# Patient Record
Sex: FEMALE | Race: WHITE | NOT HISPANIC OR LATINO | Employment: OTHER | URBAN - METROPOLITAN AREA
[De-identification: names, ages, dates, MRNs, and addresses within clinical notes are randomized per-mention and may not be internally consistent; named-entity substitution may affect disease eponyms.]

---

## 2017-02-27 ENCOUNTER — OFFICE VISIT (OUTPATIENT)
Dept: URGENT CARE | Facility: CLINIC | Age: 67
End: 2017-02-27
Payer: MEDICARE

## 2017-02-27 VITALS
SYSTOLIC BLOOD PRESSURE: 122 MMHG | OXYGEN SATURATION: 93 % | TEMPERATURE: 97.5 F | HEART RATE: 88 BPM | RESPIRATION RATE: 20 BRPM | DIASTOLIC BLOOD PRESSURE: 80 MMHG

## 2017-02-27 DIAGNOSIS — R09.81 SINUS CONGESTION: ICD-10-CM

## 2017-02-27 DIAGNOSIS — H93.8X3 CONGESTION OF BOTH EARS: Primary | ICD-10-CM

## 2017-02-27 PROCEDURE — 99213 OFFICE O/P EST LOW 20 MIN: CPT | Performed by: PHYSICIAN ASSISTANT

## 2017-02-27 PROCEDURE — 3014F SCREEN MAMMO DOC REV: CPT | Mod: 8P | Performed by: PHYSICIAN ASSISTANT

## 2017-02-27 PROCEDURE — 4040F PNEUMOC VAC/ADMIN/RCVD: CPT | Mod: 8P | Performed by: PHYSICIAN ASSISTANT

## 2017-02-27 PROCEDURE — G8484 FLU IMMUNIZE NO ADMIN: HCPCS | Performed by: PHYSICIAN ASSISTANT

## 2017-02-27 PROCEDURE — 1036F TOBACCO NON-USER: CPT | Performed by: PHYSICIAN ASSISTANT

## 2017-02-27 PROCEDURE — 3017F COLORECTAL CA SCREEN DOC REV: CPT | Mod: 8P | Performed by: PHYSICIAN ASSISTANT

## 2017-02-27 PROCEDURE — G8432 DEP SCR NOT DOC, RNG: HCPCS | Performed by: PHYSICIAN ASSISTANT

## 2017-02-27 PROCEDURE — G8421 BMI NOT CALCULATED: HCPCS | Performed by: PHYSICIAN ASSISTANT

## 2017-02-27 PROCEDURE — 1101F PT FALLS ASSESS-DOCD LE1/YR: CPT | Mod: 8P | Performed by: PHYSICIAN ASSISTANT

## 2017-02-27 ASSESSMENT — ENCOUNTER SYMPTOMS: COUGH: 1

## 2017-02-27 NOTE — PATIENT INSTRUCTIONS
Sinus Rinse  WHAT IS A SINUS RINSE?  A sinus rinse is a simple home treatment that is used to rinse your sinuses with a sterile mixture of salt and water (saline solution). Sinuses are air-filled spaces in your skull behind the bones of your face and forehead that open into your nasal cavity.  You will use the following:  · Saline solution.  · Neti pot or spray bottle. This releases the saline solution into your nose and through your sinuses. Neti pots and spray bottles can be purchased at your local pharmacy, a health food store, or online.  WHEN WOULD I DO A SINUS RINSE?  A sinus rinse can help to clear mucus, dirt, dust, or pollen from the nasal cavity. You may do a sinus rinse when you have a cold, a virus, nasal allergy symptoms, a sinus infection, or stuffiness in the nose or sinuses.  If you are considering a sinus rinse:  · Ask your child's health care provider before performing a sinus rinse on your child.  · Do not do a sinus rinse if you have had ear or nasal surgery, ear infection, or blocked ears.  HOW DO I DO A SINUS RINSE?  · Wash your hands.  · Disinfect your device according to the directions provided and then dry it.  · Use the solution that comes with your device or one that is sold separately in stores. Follow the mixing directions on the package.  · Fill your device with the amount of saline solution as directed by the device instructions.  · Stand over a sink and tilt your head sideways over the sink.  · Place the spout of the device in your upper nostril (the one closer to the ceiling).  · Gently pour or squeeze the saline solution into the nasal cavity. The liquid should drain to the lower nostril if you are not overly congested.  · Gently blow your nose. Blowing too hard may cause ear pain.  · Repeat in the other nostril.  · Clean and rinse your device with clean water and then air-dry it.  ARE THERE RISKS OF A SINUS RINSE?   Sinus rinse is generally very safe and effective. However, there  are a few risks, which include:   · A burning sensation in the sinuses. This may happen if you do not make the saline solution as directed. Make sure to follow all directions when making the saline solution.  · Infection from contaminated water. This is rare, but possible.  · Nasal irritation.     This information is not intended to replace advice given to you by your health care provider. Make sure you discuss any questions you have with your health care provider.     Document Released: 07/15/2015 Document Reviewed: 07/15/2015  Elsevier Interactive Patient Education ©2016 Elsevier Inc.

## 2017-02-27 NOTE — MR AVS SNAPSHOT
Madelyn Montes   2017 3:45 PM   Office Visit   MRN: [id number]    Department:  [facility]   Dept Phone:  [phone]    Description:  Female : 1950   Provider:  Esequiel Rahman PA-C           Reason for Visit     Cough Week ago erache , stuffy nose , few days w=dry cough and chest congestion .      Allergies as of 2017     Allergen Noted Reactions    Pcn [Penicillins] 2011   Rash    HAS TAKEN AMOXICILLIN BEFORE    Soy 2011       Swelling along face left    Sulfa Drugs 2011   Nausea, Anxiety      You were diagnosed with     Congestion of both ears   [5417204]  -  Primary     Sinus congestion   [[id number]]         Vital Signs     Blood Pressure Pulse Temperature Respirations Oxygen Saturation Smoking Status    122/80 mmHg 88 36.4 °C (97.5 °F) 20 93% Never Smoker       Basic Information     Date Of Birth Sex Race Ethnicity Preferred Language    1950 Female White Non- English      Problem List              ICD-10-CM Priority Class Noted - Resolved    Seasonal allergic rhinitis J30.2   2011 - Present      Health Maintenance        Date Due Completion Dates    IMM DTaP/Tdap/Td Vaccine (1 - Tdap) 1971 ---    PAP SMEAR 1973 ---    MAMMOGRAM 1992 ---    COLONOSCOPY 2002 ---    IMM ZOSTER VACCINE 2012 ---    IMM INFLUENZA (1) 2016 ---    BONE DENSITY 2017 ---    IMM PNEUMOCOCCAL 65+ (ADULT) LOW/MEDIUM RISK SERIES (1 of 2 - PCV13) 2017 ---            Current Immunizations     No immunizations on file.      Below and/or attached are the medications your provider expects you to take. Review all of your home medications and newly ordered medications with your provider and/or pharmacist. Follow medication instructions as directed by your provider and/or pharmacist. Please keep your medication list with you and share with your provider. Update the information when medications are discontinued, doses are changed, or new  medications (including over-the-counter products) are added; and carry medication information at all times in the event of emergency situations     Allergies:  PCN - Rash     SOY - (reactions not documented)     SULFA DRUGS - Nausea,Anxiety               Medications  Valid as of: February 27, 2017 -  4:12 PM    Generic Name Brand Name Tablet Size Instructions for use    Azithromycin (Tab) ZITHROMAX 250 MG Use as directed        Cetirizine HCl (Cap) Cetirizine HCl 10 MG Take 0.5 mg by mouth every day.        Erythromycin (Ointment) erythromycin 5 MG/GM Place  in both eyes every day.        .                 Medicines prescribed today were sent to:     [id number] - [place], [place] - [address] [facility]    [address] [address]    Phone: [phone] Fax: [phone]    Open 24 Hours?: No    [facility] [place] - [address]    [address] [address]    Phone: [phone] Fax: [phone]    Open 24 Hours?: No      Medication refill instructions:       If your prescription bottle indicates you have medication refills left, it is not necessary to call your provider’s office. Please contact your pharmacy and they will refill your medication.    If your prescription bottle indicates you do not have any refills left, you may request refills at any time through one of the following ways: The online [employer] system (except Urgent Care), by calling your provider’s office, or by asking your pharmacy to contact your provider’s office with a refill request. Medication refills are processed only during regular business hours and may not be available until the next business day. Your provider may request additional information or to have a follow-up visit with you prior to refilling your medication.   *Please Note: Medication refills are assigned a new Rx number when refilled electronically. Your pharmacy may indicate that no  refills were authorized even though a new prescription for the same medication is available at the pharmacy. Please request the medicine by name with the pharmacy before contacting your provider for a refill.        Instructions    Sinus Rinse  WHAT IS A SINUS RINSE?  A sinus rinse is a simple home treatment that is used to rinse your sinuses with a sterile mixture of salt and water (saline solution). Sinuses are air-filled spaces in your skull behind the bones of your face and forehead that open into your nasal cavity.  You will use the following:  · Saline solution.  · Neti pot or spray bottle. This releases the saline solution into your nose and through your sinuses. Neti pots and spray bottles can be purchased at your local pharmacy, a [employer] store, or online.  WHEN WOULD I DO A SINUS RINSE?  A sinus rinse can help to clear mucus, dirt, dust, or pollen from the nasal cavity. You may do a sinus rinse when you have a cold, a virus, nasal allergy symptoms, a sinus infection, or stuffiness in the nose or sinuses.  If you are considering a sinus rinse:  · Ask your child's health care provider before performing a sinus rinse on your child.  · Do not do a sinus rinse if you have had ear or nasal surgery, ear infection, or blocked ears.  HOW DO I DO A SINUS RINSE?  · Wash your hands.  · Disinfect your device according to the directions provided and then dry it.  · Use the solution that comes with your device or one that is sold separately in stores. Follow the mixing directions on the package.  · Fill your device with the amount of saline solution as directed by the device instructions.  · Stand over a sink and tilt your head sideways over the sink.  · Place the spout of the device in your upper nostril (the one closer to the ceiling).  · Gently pour or squeeze the saline solution into the nasal cavity. The liquid should drain to the lower nostril if you are not overly congested.  · Gently blow your nose. Blowing  too hard may cause ear pain.  · Repeat in the other nostril.  · Clean and rinse your device with clean water and then air-dry it.  ARE THERE RISKS OF A SINUS RINSE?   Sinus rinse is generally very safe and effective. However, there are a few risks, which include:   · A burning sensation in the sinuses. This may happen if you do not make the saline solution as directed. Make sure to follow all directions when making the saline solution.  · Infection from contaminated water. This is rare, but possible.  · Nasal irritation.     This information is not intended to replace advice given to you by your health care provider. Make sure you discuss any questions you have with your health care provider.     Document Released: 07/15/2015 Document Reviewed: 07/15/2015  [employer] Interactive Patient Education ©2016 Elsevier Inc.            Loop Trolley Access Code: [email or web address]  Expires: 3/29/2017  3:57 PM    Loop Trolley  A secure, online tool to manage your health information     [employer] Loop Trolley® is a secure, online tool that connects you to your personalized health information from the privacy of your home -- day or night - making it very easy for you to manage your healthcare. Once the activation process is completed, you can even access your medical information using the Loop Trolley heather, which is available for free in the Apple Heather store or Google Play store.     Loop Trolley provides the following levels of access (as shown below):   My Chart Features   [facility] Doctor [facility]  Specialists [facility]  Urgent  Care Non-[facility]  Primary Care  Doctor   Email your healthcare team securely and privately 24/7 X X X    Manage appointments: schedule your next appointment; view details of past/upcoming appointments X      Request prescription refills. X      View recent personal medical records, including lab and immunizations X X X X   View health record, including health history, allergies, medications X X X X   Read reports about  your outpatient visits, procedures, consult and ER notes X X X X   See your discharge summary, which is a recap of your hospital and/or ER visit that includes your diagnosis, lab results, and care plan. X X       How to register for [employer]:  1. Go to  [email or web address].  2. Click on the Sign Up Now box, which takes you to the New Member Sign Up page. You will need to provide the following information:  a. Enter your [employer] Access Code exactly as it appears at the top of this page. (You will not need to use this code after you’ve completed the sign-up process. If you do not sign up before the expiration date, you must request a new code.)   b. Enter your date of birth.   c. Enter your home email address.   d. Click Submit, and follow the next screen’s instructions.  3. Create a [employer] ID. This will be your [employer] login ID and cannot be changed, so think of one that is secure and easy to remember.  4. Create a [employer] password. You can change your password at any time.  5. Enter your Password Reset Question and Answer. This can be used at a later time if you forget your password.   6. Enter your e-mail address. This allows you to receive e-mail notifications when new information is available in [employer].  7. Click Sign Up. You can now view your health information.    For assistance activating your [employer] account, call [phone]

## 2017-02-28 NOTE — PROGRESS NOTES
Subjective:      PT is a 67 y.o. female who presents with Cough            Cough  This is a new problem. The current episode started in the past 7 days. The problem has been unchanged. The problem occurs constantly. The cough is non-productive. The symptoms are aggravated by cold air, exercise and lying down. She has tried nothing for the symptoms.   Pt notes congestion in both ears and sinus x 7 days with no use of OTC meds. Pt has not taken any Rx medications for this condition. Pt states the pain is a 1/10, aching in nature and worse at night. Pt denies CP, SOB, NVD, paresthesias, headaches, dizziness, change in vision, hives, or other joint pain. The pt's medication list, problem list, and allergies have been evaluated and reviewed during today's visit.    PMH:  Negative per pt.      PSH:  Past Surgical History   Procedure Laterality Date   • Other         Fam Hx:    family history includes Cancer in her mother.  Family Status   Relation Status Death Age   • Mother Alive    • Father         Soc HX:  Social History     Social History   • Marital Status: Single     Spouse Name: N/A   • Number of Children: N/A   • Years of Education: N/A     Occupational History   • Not on file.     Social History Main Topics   • Smoking status: Never Smoker    • Smokeless tobacco: Never Used   • Alcohol Use: No   • Drug Use: No   • Sexual Activity: Not on file     Other Topics Concern   • Not on file     Social History Narrative         Medications:    Current outpatient prescriptions:   •  azithromycin (ZITHROMAX) 250 MG TABS, Use as directed, Disp: 6 Tab, Rfl: 0  •  erythromycin 5 MG/GM OINT, Place  in both eyes every day., Disp: , Rfl:   •  Cetirizine HCl (ZYRTEC ALLERGY) 10 MG CAPS, Take 0.5 mg by mouth every day., Disp: , Rfl:       Allergies:  Pcn; Soy; and Sulfa drugs        Review of Systems   Respiratory: Positive for cough.    Constitutional: Positive for chills and malaise/fatigue.   HENT: Positive for  congestion and sore throat. Negative for ear pain.    Eyes: Negative for blurred vision, double vision and photophobia.   Respiratory:  Negative for cough, hemoptysis, shortness of breath and wheezing.    Cardiovascular: Negative for chest pain and palpitations.   Gastrointestinal: Negative for nausea, vomiting, abdominal pain, diarrhea and constipation.   Genitourinary: Negative for dysuria and flank pain.   Musculoskeletal: Negative for falls and myalgias.   Skin: Negative for itching and rash.   Neurological: Positive for headaches. Negative for dizziness and tingling.   Endo/Heme/Allergies: Does not bruise/bleed easily.   Psychiatric/Behavioral: Negative for depression. The patient is not nervous/anxious.    All other systems reviewed and are negative.           Objective:     /80 mmHg  Pulse 88  Temp(Src) 36.4 °C (97.5 °F)  Resp 20  SpO2 93%     Physical Exam         Physical Exam   Constitutional: Pt is oriented to person, place, and time. Pt appears well-developed and well-nourished. No distress.   HENT:   Head: Normocephalic and atraumatic.   Right Ear: Hearing, tympanic membrane, external ear and ear canal normal.   Left Ear: Hearing, tympanic membrane, external ear and ear canal normal.   Nose: Mucosal edema, rhinorrhea and sinus tenderness present. No nose lacerations, nasal deformity, septal deviation or nasal septal hematoma. No epistaxis.  No foreign bodies. Right sinus exhibits maxillary sinus tenderness and frontal sinus tenderness. Left sinus exhibits maxillary sinus tenderness and frontal sinus tenderness.   Mouth/Throat: Oropharynx is clear and moist. No oropharyngeal exudate.   Eyes: Conjunctivae normal and EOM are normal. Pupils are equal, round, and reactive to light. Right eye exhibits no discharge. Left eye exhibits no discharge.   Neck: Normal range of motion. Neck supple. No tracheal deviation present. No thyromegaly present.   Cardiovascular: Normal rate, regular rhythm, normal  heart sounds and intact distal pulses.  Exam reveals no gallop and no friction rub.    No murmur heard.  Pulmonary/Chest: Effort normal and breath sounds normal. No respiratory distress. Pt has no wheezes. Pt has no rales. Pt exhibits no tenderness.   Abdominal: Soft. Bowel sounds are normal. Pt exhibits no distension and no mass. There is no tenderness. There is no rebound and no guarding.   Musculoskeletal: Normal range of motion. Pt exhibits no edema and no tenderness.   Lymphadenopathy:     Pt has no cervical adenopathy.   Neurological: Pt is alert and oriented to person, place, and time. Pt has normal reflexes. Pt displays normal reflexes. No cranial nerve deficit. Pt exhibits normal muscle tone. Coordination normal.   Skin: Skin is warm and dry. No rash noted. No erythema.   Psychiatric: Pt has a normal mood and affect. Pt behavior is normal. Judgment and thought content normal.          Assessment/Plan:     1. Congestion of both ears      2. Sinus congestion      Rest, fluids encouraged.  OTC decongestant for congestion/cough  AVS with medical info given.  Pt was in full understanding and agreement with the plan.  Follow-up as needed if symptoms worsen or fail to improve.

## 2017-09-05 ENCOUNTER — OFFICE VISIT (OUTPATIENT)
Dept: URGENT CARE | Facility: CLINIC | Age: 67
End: 2017-09-05
Payer: MEDICARE

## 2017-09-05 VITALS
TEMPERATURE: 97.6 F | OXYGEN SATURATION: 100 % | DIASTOLIC BLOOD PRESSURE: 92 MMHG | HEIGHT: 67 IN | SYSTOLIC BLOOD PRESSURE: 140 MMHG | HEART RATE: 96 BPM | RESPIRATION RATE: 18 BRPM

## 2017-09-05 DIAGNOSIS — L08.9 SKIN INFECTION: ICD-10-CM

## 2017-09-05 DIAGNOSIS — L60.0 INGROWN TOENAIL: ICD-10-CM

## 2017-09-05 PROCEDURE — 99214 OFFICE O/P EST MOD 30 MIN: CPT | Performed by: FAMILY MEDICINE

## 2017-09-05 RX ORDER — DOXYCYCLINE HYCLATE 100 MG
100 TABLET ORAL 2 TIMES DAILY
Qty: 14 TAB | Refills: 0 | Status: SHIPPED | OUTPATIENT
Start: 2017-09-05 | End: 2017-09-12

## 2017-09-05 ASSESSMENT — PAIN SCALES - GENERAL: PAINLEVEL: 5=MODERATE PAIN

## 2017-09-05 NOTE — PROGRESS NOTES
"Subjective:      Madelyn Montes is a 67 y.o. female who presents with Toe Pain (swelling and painful, not sure if )    Right great toe ingrown nail Patient is noticed today there is pain and redness some mild discharge. She has not soaked it yet. Had history of the same in the past. No fevers or chills. She did walk several miles on a hike over this weekend.        HPI  ROS    PMH:  has no past medical history on file.  MEDS:   Current Outpatient Prescriptions:   •  doxycycline (VIBRAMYCIN) 100 MG Tab, Take 1 Tab by mouth 2 times a day for 7 days., Disp: 14 Tab, Rfl: 0  •  azithromycin (ZITHROMAX) 250 MG TABS, Use as directed, Disp: 6 Tab, Rfl: 0  •  erythromycin 5 MG/GM OINT, Place  in both eyes every day., Disp: , Rfl:   •  Cetirizine HCl (ZYRTEC ALLERGY) 10 MG CAPS, Take 0.5 mg by mouth every day., Disp: , Rfl:   ALLERGIES:   Allergies   Allergen Reactions   • Pcn [Penicillins] Rash     HAS TAKEN AMOXICILLIN BEFORE   • Soy      Swelling along face left   • Sulfa Drugs Nausea and Anxiety   SURGHX:   Past Surgical History:   Procedure Laterality Date   • OTHER     SOCHX:  reports that she has never smoked. She has never used smokeless tobacco. She reports that she does not drink alcohol or use drugs.  FH: Family history was reviewed, no pertinent findings to report   Objective:     /92   Pulse 96   Temp 36.4 °C (97.6 °F)   Resp 18   Ht 1.702 m (5' 7\")   SpO2 100%      Physical Exam   Constitutional: She appears well-developed and well-nourished. No distress.   HENT:   Mouth/Throat: Oropharynx is clear and moist.   Cardiovascular: Normal rate.    Pulmonary/Chest: Effort normal.   Musculoskeletal: Normal range of motion. She exhibits tenderness. She exhibits no edema or deformity.        Feet:    Erythema mild d/c   Neurological: She is alert.   Skin: Skin is warm. She is not diaphoretic. There is erythema.   Psychiatric: She has a normal mood and affect. Her behavior is normal. Thought content normal. "            Assessment/Plan:     1. Ingrown toenail  - doxycycline (VIBRAMYCIN) 100 MG Tab; Take 1 Tab by mouth 2 times a day for 7 days.  Dispense: 14 Tab; Refill: 0    2. Skin infection  - doxycycline (VIBRAMYCIN) 100 MG Tab; Take 1 Tab by mouth 2 times a day for 7 days.  Dispense: 14 Tab; Refill: 0  -given info handout

## 2017-09-05 NOTE — PATIENT INSTRUCTIONS
Ingrown Toenail    Your caregiver has diagnosed you as having an ingrown toenail. An ingrown toenail occurs when the sharp edge of your toenail grows into the skin. Causes of ingrown toenails include toenails clipped too far back or poorly fitting shoes. Activities involving sudden stops (basketball, tennis) causing “toe jamming” may lead to an ingrown nail.    HOME CARE INSTRUCTIONS  Ø Soak the whole foot in warm water 3 cups/ to one cup of apple cider vinegar 15-20 minutes 2 times per day.  Ø You may lift the edge of the nail away from the sore skin when it is wet after the soak. Be careful not to dig (traumatize) and cause more injury to the area.  Ø Wear shoes that fit well. While the ingrown nail is causing problems, sandals may be beneficial.  Ø Trim your toenails regularly and carefully. Cut your toenails straight across, not in a curve. This will prevent injury to the skin at the corners of the toenail.  Ø Keep your feet clean and dry  Ø Antibiotics, if prescribed, should be taken as directed.  Ø Use ibuprofen (Advil® or Motrin®) as needed for discomfort or pain and inflammation.  Your provider may prescribe something stronger for pain as needed.    seek immediate medical attention if:  Ø An oral temperature above 102° F (38.9° C) develops, or as your caregiver suggests.  Ø You have increasing pain, redness, swelling or heat at the wound site.  Ø The toe is not better in 7 days.    Follow-up with podiatry as needed    Document Released: 12/18/2006  Document Re-Released: 06/11/2007  [employer]® Patient Information ©2008 [employer].

## 2017-09-06 ENCOUNTER — HOSPITAL ENCOUNTER (EMERGENCY)
Facility: MEDICAL CENTER | Age: 67
End: 2017-09-07
Attending: EMERGENCY MEDICINE
Payer: MEDICARE

## 2017-09-06 DIAGNOSIS — L60.0 INGROWN TOENAIL: ICD-10-CM

## 2017-09-06 PROCEDURE — 99283 EMERGENCY DEPT VISIT LOW MDM: CPT

## 2017-09-06 PROCEDURE — 700101 HCHG RX REV CODE 250: Performed by: EMERGENCY MEDICINE

## 2017-09-06 PROCEDURE — 11730 AVULSION NAIL PLATE SIMPLE 1: CPT

## 2017-09-06 RX ORDER — LIDOCAINE AND PRILOCAINE 25; 25 MG/G; MG/G
CREAM TOPICAL ONCE
Status: COMPLETED | OUTPATIENT
Start: 2017-09-06 | End: 2017-09-06

## 2017-09-06 RX ORDER — PSEUDOEPHEDRINE HCL 30 MG
30 TABLET ORAL
Status: SHIPPED | COMMUNITY
End: 2021-01-01

## 2017-09-06 RX ADMIN — LIDOCAINE AND PRILOCAINE 5 ML: 25; 25 CREAM TOPICAL at 23:15

## 2017-09-06 ASSESSMENT — PAIN SCALES - GENERAL: PAINLEVEL_OUTOF10: 6

## 2017-09-07 VITALS
HEART RATE: 75 BPM | HEIGHT: 67 IN | BODY MASS INDEX: 33.32 KG/M2 | OXYGEN SATURATION: 96 % | TEMPERATURE: 98 F | SYSTOLIC BLOOD PRESSURE: 185 MMHG | RESPIRATION RATE: 20 BRPM | WEIGHT: 212.3 LBS | DIASTOLIC BLOOD PRESSURE: 89 MMHG

## 2017-09-07 RX ORDER — INDOMETHACIN 50 MG/1
50 CAPSULE ORAL 3 TIMES DAILY PRN
Qty: 20 CAP | Refills: 0 | Status: SHIPPED | OUTPATIENT
Start: 2017-09-07 | End: 2021-01-01

## 2017-09-07 NOTE — ED NOTES
"Pt to er with c/o continued pain and swelling to rt great toe after being seen at urgent care yesterday for same. States called nurse hotline this evening and due to increased pain and swelling pt \"needed to go to er and be seen within 4 hrs\".  "

## 2017-09-07 NOTE — ED PROVIDER NOTES
ED Provider Note    Chief Complaint:   Right great toe pain.    HPI:  Madelyn Montes is a 67 y.o. female who presents with pain to the right great toe. Onset was 3 days ago, patient noticed an ingrown toenail that was becoming reddened and swollen along the medial aspect of the toenail. She was seen at urgent care yesterday, told to soak the toe frequently and prescribed doxycycline. She took one dose of that this morning however her toe pain worsened. She now complains of pain along the base of all toes and feels as though her foot is swollen. Denies numbness or tingling, she has had a previous ingrown toenail treated by a podiatrist in [place].     She denies any associated fevers. She has no history of diabetes or other endocrine disorder. She is a nonsmoker, no impaired immunity, no risk factors for poor wound healing.    Review of Systems:  See HPI for pertinent positives and negatives.    Past Medical History:   has a past medical history of History of [place] fever.    Social History:  Social History     Social History Main Topics   • Smoking status: Never Smoker   • Smokeless tobacco: Never Used   • Alcohol use No   • Drug use: No   • Sexual activity: Not on file       Surgical History:   has a past surgical history that includes other and cataract extraction with iol (Bilateral).    Current Medications:  Home Medications     Reviewed by Chelsi Bergeron R.N. (Registered Nurse) on 09/06/17 at 2232  Med List Status: Complete   Medication Last Dose Status   Cetirizine HCl (ZYRTEC ALLERGY) 10 MG CAPS 9/5/2017 Active   doxycycline (VIBRAMYCIN) 100 MG Tab past week Active   pseudoephedrine (SUDAFED) 30 MG Tab  Active                Allergies:  Allergies   Allergen Reactions   • Pcn [Penicillins] Rash     HAS TAKEN AMOXICILLIN BEFORE   • Soy      Swelling along face left   • Sulfa Drugs Nausea and Anxiety       Physical Exam:  Vital Signs: BP (!) 194/89   Pulse 89   Temp 36.7 °C (98 °F)   Resp 20    "Ht 1.702 m (5' 7\")   Wt 96.3 kg (212 lb 4.9 oz)   SpO2 96%   BMI 33.25 kg/m²   Constitutional: Alert, no acute distress  Skin: Warm, dry, no rashes or lesions, abnormalities as described in musculoskeletal exam  Musculoskeletal: Right great toe: Embedded, ingrown toenail to the medial aspect of the great toe, small area of surrounding fluctuance with scant purulent drainage, base of the great toe without redness, no warmth, no visible swelling though with mild tenderness to palpation and mild discomfort with movement. No visible swelling to the dorsal aspect of the foot. No edema on palpation.  Neurologic: Sensation intact and affected extremity    Medical records reviewed for continuity of care: Urgent care report reviewed from 9/5/17. Patient presented with right ingrown toenail. Prescribed doxycycline, instructed to soak the toenail daily.    Ingrown toenail removal    Indication: Pain  Location: Medial aspect of the right great toe    Procedure, alternate treatment options, risks and benefits were thoroughly explained to the patient and informed consent was obtained before the procedure started. Appropriate procedure and site were confirmed by patient and provider. The area was prepped using chlorhexidine and. Digital block was performed using 5 mL of lidocaine 1% for local anesthesia. Suture scissors were used, the ingrown part of the toenail was cut to its emergence from the matrix. The ingrown part of the toenail was then undermined. Toenail was divided to separate the ingrown portion from the healthy portion. The entire  piece of the toenail was removed. Hemostasis was achieved. Wound was cleansed. Dressing applied. Estimated blood loss was scant. Patient tolerated the procedure well without complications.      MDM:  Patient presents with ingrown toenail. Ingrown portion of the nail was removed according to above procedure note. Patient tolerated the procedure well. With regard to the pain " radiating to the base of the great toe, I do not see any redness, no warmth, less concerning for cellulitis. She has had a previous episode of gout, this may represent a very early infection or early episode of gout. She has normal range of motion of the great toe without concern for septic joint. She was previously prescribed doxycycline due to other antibiotic allergies. Have instructed her to continue this medication. Suspect her symptoms will resolve now that the ingrown portion of the toenail has been removed. She will continue warm soaks and dressing changes. She was referred to a local podiatrist for follow-up appointment in the next 1-2 weeks. She will return to the emergency department if she develops fevers, redness, warmth or worsening pain. She was offered indomethacin for pain. She declined any stronger narcotic pain medication stating that they do not agree with her.    Blood pressure today is greater than 120/80, pt instructed to follow up with primary care for recheck    Disposition:  Discharge home in stable condition    Final Impression:  1. Ingrown toenail        Electronically signed by: Natacha Dean, 9/6/2017 10:55 PM

## 2017-09-07 NOTE — DISCHARGE INSTRUCTIONS
Please follow-up with podiatry in 2-3 weeks to recheck the toe. The toenail may need to be further reshaped to prevent subsequent ingrown toenails. Return to the emergency department if he developed worsening redness, fevers, worsening swelling, or any further concerns. Please continue taking antibiotics as prescribed by urgent care.      Infected Ingrown Toenail  An infected ingrown toenail occurs when the nail edge grows into the skin and bacteria invade the area. Symptoms include pain, tenderness, swelling, and pus drainage from the edge of the nail. Poorly fitting shoes, minor injuries, and improper cutting of the toenail may also contribute to the problem. You should cut your toenails squarely instead of rounding the edges. Do not cut them too short. Avoid tight or pointed toe shoes. Sometimes the ingrown portion of the nail must be removed. If your toenail is removed, it can take 3-4 months for it to re-grow.  HOME CARE INSTRUCTIONS   · Soak your infected toe in warm water for 20-30 minutes, 2 to 3 times a day.  · Packing or dressings applied to the area should be changed daily.  · Take medicine as directed and finish them.  · Reduce activities and keep your foot elevated when able to reduce swelling and discomfort. Do this until the infection gets better.  · Wear sandals or go barefoot as much as possible while the infected area is sensitive.  · See your caregiver for follow-up care in 2-3 days if the infection is not better.  SEEK MEDICAL CARE IF:   Your toe is becoming more red, swollen or painful.  MAKE SURE YOU:   · Understand these instructions.  · Will watch your condition.  · Will get help right away if you are not doing well or get worse.  Document Released: 01/25/2006 Document Revised: 03/11/2013 Document Reviewed: 12/14/2009  [employer]® Patient Information ©2014 [employer].

## 2017-09-07 NOTE — ED NOTES
Discharge instructions and rx given. Educated on when to return to ed. Pt hypertensive at discharge and states no history. Per Dr martinez f/u with primary care for recheck. Pt agreeable.

## 2017-12-20 ENCOUNTER — OFFICE VISIT (OUTPATIENT)
Dept: URGENT CARE | Facility: CLINIC | Age: 67
End: 2017-12-20
Payer: MEDICARE

## 2017-12-20 ENCOUNTER — APPOINTMENT (OUTPATIENT)
Dept: RADIOLOGY | Facility: IMAGING CENTER | Age: 67
End: 2017-12-20
Attending: PHYSICIAN ASSISTANT
Payer: MEDICARE

## 2017-12-20 VITALS
WEIGHT: 203 LBS | BODY MASS INDEX: 31.86 KG/M2 | TEMPERATURE: 97 F | RESPIRATION RATE: 16 BRPM | OXYGEN SATURATION: 92 % | HEIGHT: 67 IN | SYSTOLIC BLOOD PRESSURE: 136 MMHG | HEART RATE: 83 BPM | DIASTOLIC BLOOD PRESSURE: 82 MMHG

## 2017-12-20 DIAGNOSIS — R05.9 COUGH: ICD-10-CM

## 2017-12-20 DIAGNOSIS — J18.9 PNEUMONIA OF RIGHT LOWER LOBE DUE TO INFECTIOUS ORGANISM: ICD-10-CM

## 2017-12-20 PROCEDURE — 99214 OFFICE O/P EST MOD 30 MIN: CPT | Performed by: PHYSICIAN ASSISTANT

## 2017-12-20 PROCEDURE — 71020 DX-CHEST-2 VIEWS: CPT | Mod: TC | Performed by: PHYSICIAN ASSISTANT

## 2017-12-20 RX ORDER — DOXYCYCLINE HYCLATE 100 MG/1
100 CAPSULE ORAL 2 TIMES DAILY
Qty: 20 EACH | Refills: 0 | Status: SHIPPED | OUTPATIENT
Start: 2017-12-20 | End: 2017-12-30

## 2017-12-20 RX ORDER — ALBUTEROL SULFATE 2.5 MG/3ML
2.5 SOLUTION RESPIRATORY (INHALATION) ONCE
Status: COMPLETED | OUTPATIENT
Start: 2017-12-20 | End: 2017-12-20

## 2017-12-20 RX ADMIN — ALBUTEROL SULFATE 2.5 MG: 2.5 SOLUTION RESPIRATORY (INHALATION) at 19:37

## 2017-12-20 ASSESSMENT — ENCOUNTER SYMPTOMS
COUGH: 1
HEMOPTYSIS: 0
SPUTUM PRODUCTION: 1
PALPITATIONS: 0
SORE THROAT: 1
FEVER: 1
SHORTNESS OF BREATH: 1
CHILLS: 1
WHEEZING: 0

## 2017-12-20 ASSESSMENT — COPD QUESTIONNAIRES: COPD: 0

## 2017-12-21 NOTE — PATIENT INSTRUCTIONS
Pneumonia, Adult  Pneumonia is an infection of the lungs.   CAUSES  Pneumonia may be caused by bacteria or a virus. Usually, the infection is caused by breathing in droplets from an infected person's cough or sneeze.   SYMPTOMS   Symptoms of pneumonia include:  · Cough.  · Fever.  · Chest pain.  · Rapid breathing.  · Shortness of breath.  · Shaking chills.  · Mucus production.  DIAGNOSIS   If you have the common symptoms of pneumonia, often your health care provider will confirm the diagnosis with a chest X-ray. The X-ray will show an abnormality in the lung if you have pneumonia. Other tests may be done on your blood, urine, or mucus (sputum) to find the specific cause of your pneumonia. A blood gas test or pulse oximetry test may be needed to check how well your lungs are working.  TREATMENT   Your treatment will depend on whether your pneumonia is caused by bacteria or a virus.   · Bacterial pneumonia is treated with antibiotic medicine.  · Pneumonia that is caused by the influenza virus may be treated with an antiviral medicine.  · Pneumonia that is caused by a virus other than influenza will not respond to antibiotic medicine. This type of pneumonia will have to run its course.   HOME CARE INSTRUCTIONS   · Cough suppressants may be used if you are losing too much rest from coughing at night. However, you should try to avoid taking cough suppresants. This is because coughing helps to remove mucus from your lungs.  · Sleep in a semi-upright position at night. Try sleeping in a reclining chair, or place a few pillows under your head.  · Try using a cold steam vaporizer or humidifier in your home or bedroom. This may help loosen your mucus.  · If you were prescribed an antibiotic medicine, finish all of it even if you start to feel better.  · If you were prescribed an expectorant, take it as directed by your health care provider. This medicine loosens the mucus so you can cough it up.  · Take medicines only as  directed by your health care provider.  · Do not smoke. If you are a smoker and continue to smoke, your cough may last several weeks after your pneumonia has cleared.  · Get rest when you feel tired, or as needed.  PREVENTION  A pneumococcal shot (vaccine) is available to prevent a common bacterial cause of pneumonia. This is usually suggested for:  · People over 65 years old.  · People on chemotherapy.  · People with chronic lung problems, such as bronchitis or emphysema.  · People with immune system problems.  If you are over 65 years old or have a high risk condition, you may receive the pneumococcal vaccine if you have not received it before. In some countries, a routine influenza vaccine is also recommended. This vaccine can help prevent some cases of pneumonia. You may be offered the influenza vaccine as part of your care.  If you are a smoker, it is time to quit in order to prevent pneumonia in the future. You may receive instructions on how to stop smoking. Your health care provider can provide medicines and counseling to help you quit.  SEEK MEDICAL CARE IF:  · You have a fever.  · You cannot control your cough with suppressants at night, and you keep losing sleep.  SEEK IMMEDIATE MEDICAL CARE IF:   · You have worsening shortness of breath.  · You have increased chest pain.  · Your sickness becomes worse, especially if you are an older adult or have a weakened immune system.  · You cough up blood.  · You have pain that is getting worse or is not controlled with medicines.  · Your symptoms are getting worse rather than better.     This information is not intended to replace advice given to you by your health care provider. Make sure you discuss any questions you have with your health care provider.     Document Released: 12/18/2006 Document Revised: 01/08/2016 Document Reviewed: 04/13/2016  [email or web address] Patient Education ©2016 [employer]

## 2017-12-21 NOTE — PROGRESS NOTES
Subjective:      Madelyn Montes is a 67 y.o. female who presents with No chief complaint on file.            Cough   This is a new problem. The current episode started in the past 7 days. The problem has been unchanged. The problem occurs constantly. The cough is productive of sputum. Associated symptoms include chills, a fever, a sore throat and shortness of breath. Pertinent negatives include no chest pain, ear pain, hemoptysis or wheezing. The symptoms are aggravated by lying down. She has tried OTC cough suppressant for the symptoms. The treatment provided no relief. There is no history of asthma or COPD.       Review of Systems   Constitutional: Positive for chills, fever and malaise/fatigue.   HENT: Positive for congestion and sore throat. Negative for ear pain.    Respiratory: Positive for cough, sputum production and shortness of breath. Negative for hemoptysis and wheezing.    Cardiovascular: Negative for chest pain and palpitations.   All other systems reviewed and are negative.    PMH:  has a past medical history of History of [place] fever.  MEDS:   Current Outpatient Prescriptions:   •  doxycycline (VIBRAMYCIN) 100 MG Cap, Take 1 Cap by mouth 2 times a day for 10 days., Disp: 20 Each, Rfl: 0  •  indomethacin (INDOCIN) 50 MG Cap, Take 1 Cap by mouth 3 times a day as needed (pain)., Disp: 20 Cap, Rfl: 0  •  pseudoephedrine (SUDAFED) 30 MG Tab, Take 30 mg by mouth 1 time daily as needed for Congestion., Disp: , Rfl:   •  Cetirizine HCl (ZYRTEC ALLERGY) 10 MG CAPS, Take 0.5 mg by mouth every day., Disp: , Rfl:   ALLERGIES:   Allergies   Allergen Reactions   • Pcn [Penicillins] Rash     HAS TAKEN AMOXICILLIN BEFORE   • Soy      Swelling along face left   • Sulfa Drugs Nausea and Anxiety     SURGHX:   Past Surgical History:   Procedure Laterality Date   • CATARACT EXTRACTION WITH IOL Bilateral    • OTHER       SOCHX:  reports that she has never smoked. She has never used smokeless tobacco. She  "reports that she does not drink alcohol or use drugs.  FH: Family history was reviewed, no pertinent findings to report  Medications, Allergies, and current problem list reviewed today in Epic         Objective:     /82   Pulse 83   Temp 36.1 °C (97 °F)   Resp 16   Ht 1.702 m (5' 7\")   Wt 92.1 kg (203 lb)   SpO2 92%   BMI 31.79 kg/m²      Physical Exam   Constitutional: She is oriented to person, place, and time. She appears well-developed and well-nourished. She is active.  Non-toxic appearance. She does not have a sickly appearance. She does not appear ill. No distress. She is not intubated.   HENT:   Head: Normocephalic and atraumatic.   Right Ear: Hearing, tympanic membrane, external ear and ear canal normal.   Left Ear: Hearing, tympanic membrane, external ear and ear canal normal.   Nose: Nose normal.   Mouth/Throat: Uvula is midline, oropharynx is clear and moist and mucous membranes are normal.   Eyes: Conjunctivae, EOM and lids are normal.   Neck: Normal range of motion. Neck supple.   Cardiovascular: Regular rhythm, S1 normal, S2 normal and normal heart sounds.  Exam reveals no gallop and no friction rub.    No murmur heard.  Pulmonary/Chest: Effort normal. No accessory muscle usage. No apnea, no tachypnea and no bradypnea. She is not intubated. No respiratory distress. She has no decreased breath sounds. She has wheezes. She has no rhonchi. She has rales. She exhibits no tenderness.   Musculoskeletal: Normal range of motion.   Neurological: She is alert and oriented to person, place, and time.   Skin: Skin is warm and dry.   Psychiatric: She has a normal mood and affect. Her speech is normal and behavior is normal. Judgment and thought content normal.   Vitals reviewed.         12/20/2017 5:30 PM    HISTORY/REASON FOR EXAM:  Cough for one week.      TECHNIQUE/EXAM DESCRIPTION AND NUMBER OF VIEWS:  Two views of the chest.    COMPARISON:  None available.    FINDINGS:      The mediastinal and " cardiac silhouette is unremarkable.    The pulmonary vascularity is within normal limits.    There is an ill-defined parenchymal opacity in the posterior right lower lobe abutting the heart border suspicious for pneumonitis. There is minimal linear opacity in the left lower lobe.    There is no significant pleural effusion.    There is no visible pneumothorax.    There are no acute bony abnormalities.   Impression       1.  There are findings discussed above suspicious for right lower lobe pneumonia. Recommend follow-up chest x-ray and 4-6 weeks to ensure complete resolution.          Assessment/Plan:     1. Pneumonia of right lower lobe due to infectious organism (CMS-HCC)    - Follow up in 7-10 day for recheck or sooner if worsening condition.  - DX-CHEST-2 VIEWS; Future  - albuterol (PROVENTIL) 2.5mg/3ml nebulizer solution 2.5 mg; 3 mL by Nebulization route Once.  - doxycycline (VIBRAMYCIN) 100 MG Cap; Take 1 Cap by mouth 2 times a day for 10 days.  Dispense: 20 Each; Refill: 0    Differential diagnosis, natural history, supportive care, and indications for immediate follow-up discussed at length.   Follow-up with primary care provider within 4-5 days, emergency room precautions discussed.  Patient and/or family appears understanding of information.

## 2018-01-12 ENCOUNTER — OFFICE VISIT (OUTPATIENT)
Dept: URGENT CARE | Facility: CLINIC | Age: 68
End: 2018-01-12
Payer: MEDICARE

## 2018-01-12 ENCOUNTER — APPOINTMENT (OUTPATIENT)
Dept: RADIOLOGY | Facility: IMAGING CENTER | Age: 68
End: 2018-01-12
Attending: FAMILY MEDICINE
Payer: MEDICARE

## 2018-01-12 VITALS
HEART RATE: 93 BPM | HEIGHT: 67 IN | TEMPERATURE: 98.7 F | SYSTOLIC BLOOD PRESSURE: 114 MMHG | WEIGHT: 204 LBS | DIASTOLIC BLOOD PRESSURE: 86 MMHG | RESPIRATION RATE: 16 BRPM | BODY MASS INDEX: 32.02 KG/M2 | OXYGEN SATURATION: 94 %

## 2018-01-12 DIAGNOSIS — J18.9 PNEUMONIA OF RIGHT LOWER LOBE DUE TO INFECTIOUS ORGANISM: ICD-10-CM

## 2018-01-12 DIAGNOSIS — J06.9 VIRAL URI WITH COUGH: ICD-10-CM

## 2018-01-12 DIAGNOSIS — M10.9 GOUT OF BIG TOE: ICD-10-CM

## 2018-01-12 PROCEDURE — 99214 OFFICE O/P EST MOD 30 MIN: CPT | Performed by: FAMILY MEDICINE

## 2018-01-12 PROCEDURE — 71046 X-RAY EXAM CHEST 2 VIEWS: CPT | Mod: TC,FY | Performed by: FAMILY MEDICINE

## 2018-01-12 RX ORDER — INDOMETHACIN 50 MG/1
50 CAPSULE ORAL 3 TIMES DAILY PRN
Qty: 15 CAP | Refills: 0 | Status: SHIPPED | OUTPATIENT
Start: 2018-01-12 | End: 2018-01-17

## 2018-01-13 NOTE — PROGRESS NOTES
"Subjective:      Madelyn Montes is a 67 y.o. female who presents with Pneumonia    Patient presents to the urgent care after being diagnosed and treated for pneumonia on 20 December she cleated on her antibiotics is feeling much better until yesterday she felt a cough returned some heaviness in her chest fevers and chills. Same time she also started an acute right great toe gout attack. The last time she took any antipyretic with some aspirin yesterday morning. No nausea vomiting or diarrhea she denies feeling lightheaded or faint. Denies any significant shortness of breath or chest pain. She did not receive a flu vaccination this season she denies any significant body aches.      HPI  ROS  PMH:  has a past medical history of History of [facility] fever.  MEDS:   Current Outpatient Prescriptions:   •  indomethacin (INDOCIN) 50 MG Cap, Take 1 Cap by mouth 3 times a day as needed (pain)., Disp: 20 Cap, Rfl: 0  •  pseudoephedrine (SUDAFED) 30 MG Tab, Take 30 mg by mouth 1 time daily as needed for Congestion., Disp: , Rfl:   •  Cetirizine HCl (ZYRTEC ALLERGY) 10 MG CAPS, Take 0.5 mg by mouth every day., Disp: , Rfl:   ALLERGIES:   Allergies   Allergen Reactions   • Pcn [Penicillins] Rash     HAS TAKEN AMOXICILLIN BEFORE   • Soy      Swelling along face left   • Sulfa Drugs Nausea and Anxiety   SURGHX:   Past Surgical History:   Procedure Laterality Date   • CATARACT EXTRACTION WITH IOL Bilateral    • OTHER     SOCHX:  reports that she has never smoked. She has never used smokeless tobacco. She reports that she does not drink alcohol or use drugs.  FH: Family history was reviewed, no pertinent findings to report     Objective:     /86   Pulse 93   Temp 37.1 °C (98.7 °F)   Resp 16   Ht 1.702 m (5' 7\")   Wt 92.5 kg (204 lb)   SpO2 94%   BMI 31.95 kg/m²      Physical Exam   Constitutional: She is oriented to person, place, and time. She appears well-developed and well-nourished. No distress.   HENT: "   Mouth/Throat: Oropharynx is clear and moist.   Eyes: Conjunctivae are normal.   Cardiovascular: Normal rate, regular rhythm, normal heart sounds and intact distal pulses.  Exam reveals no gallop and no friction rub.    No murmur heard.  Pulmonary/Chest: Effort normal and breath sounds normal. No respiratory distress. She has no wheezes. She has no rales. She exhibits no tenderness.   Musculoskeletal:   Right great toe with pain and swelling in first metatarsal region   Neurological: She is alert and oriented to person, place, and time.   Skin: Skin is warm and dry. She is not diaphoretic.   Psychiatric: She has a normal mood and affect. Her behavior is normal.         diagnostics: per my review no acute abn  No active disease.   Reading Provider Reading Date   Charles Garcia M.D. Jan 12, 2018       Assessment/Plan:     1. Pneumonia of right lower lobe due to infectious organism (CMS-[facility])  DX-CHEST-2 VIEWS   2. Viral URI with cough  Hydrocod Polst-CPM Polst ER (TUSSIONEX) 10-8 MG/5ML Suspension Extended Release   3. Gout of big toe  indomethacin (INDOCIN) 50 MG Cap     Follow-up with primary care provider within 4-5 days, emergency room precautions discussed.  Patient and/or family appears understanding of information.

## 2020-01-03 ENCOUNTER — OFFICE VISIT (OUTPATIENT)
Dept: URGENT CARE | Facility: CLINIC | Age: 70
End: 2020-01-03
Payer: MEDICARE

## 2020-01-03 VITALS
HEART RATE: 68 BPM | SYSTOLIC BLOOD PRESSURE: 124 MMHG | OXYGEN SATURATION: 95 % | TEMPERATURE: 98 F | WEIGHT: 200 LBS | HEIGHT: 68 IN | DIASTOLIC BLOOD PRESSURE: 64 MMHG | RESPIRATION RATE: 16 BRPM | BODY MASS INDEX: 30.31 KG/M2

## 2020-01-03 DIAGNOSIS — R05.9 COUGH: ICD-10-CM

## 2020-01-03 DIAGNOSIS — J01.40 ACUTE NON-RECURRENT PANSINUSITIS: ICD-10-CM

## 2020-01-03 PROCEDURE — 99214 OFFICE O/P EST MOD 30 MIN: CPT | Performed by: PHYSICIAN ASSISTANT

## 2020-01-03 RX ORDER — BENZONATATE 100 MG/1
100 CAPSULE ORAL 3 TIMES DAILY PRN
Qty: 60 CAP | Refills: 0 | Status: SHIPPED | OUTPATIENT
Start: 2020-01-03 | End: 2021-01-01

## 2020-01-03 RX ORDER — DOXYCYCLINE HYCLATE 100 MG
100 TABLET ORAL 2 TIMES DAILY
Qty: 14 TAB | Refills: 0 | Status: SHIPPED | OUTPATIENT
Start: 2020-01-03 | End: 2020-01-10

## 2020-01-03 ASSESSMENT — ENCOUNTER SYMPTOMS
SHORTNESS OF BREATH: 0
HEADACHES: 1
SPUTUM PRODUCTION: 0
DIZZINESS: 0
MUSCULOSKELETAL NEGATIVE: 1
NECK PAIN: 0
SINUS PAIN: 1
SORE THROAT: 1
SINUS PRESSURE: 1
NAUSEA: 0
COUGH: 1
DIARRHEA: 0
VOMITING: 0
ABDOMINAL PAIN: 0
CHILLS: 0
FEVER: 0

## 2020-01-03 NOTE — PROGRESS NOTES
"Subjective:      Madelyn Montes is a 69 y.o. female who presents with Sinus Problem (x3 weeks); Cough; and Otalgia            Sinus Problem   This is a new problem. The current episode started 1 to 4 weeks ago (3 weeks). The problem is unchanged. Her pain is at a severity of 2/10. The pain is mild. Associated symptoms include congestion, coughing, ear pain, headaches, sinus pressure and a sore throat. Pertinent negatives include no chills, neck pain or shortness of breath. Treatments tried: OTC cough medication. The treatment provided mild relief.     Patient presents to urgent care reporting a 3 week history of sinus congestion, cough, headaches, sore throat, and ear pain. No fevers, chills, body aches, chest pain, wheezing, or SOB. No known sick contacts or recent use of antibiotics.     Review of Systems   Constitutional: Negative for chills and fever.   HENT: Positive for congestion, ear pain, sinus pressure, sinus pain and sore throat.    Respiratory: Positive for cough. Negative for sputum production and shortness of breath.    Cardiovascular: Negative for chest pain.   Gastrointestinal: Negative for abdominal pain, diarrhea, nausea and vomiting.   Genitourinary: Negative.    Musculoskeletal: Negative.  Negative for neck pain.   Skin: Negative for rash.   Neurological: Positive for headaches. Negative for dizziness.        Objective:     /64 (BP Location: Left arm, Patient Position: Sitting, BP Cuff Size: Adult long)   Pulse 68   Temp 36.7 °C (98 °F) (Temporal)   Resp 16   Ht 1.715 m (5' 7.5\")   Wt 90.7 kg (200 lb)   SpO2 95%   BMI 30.86 kg/m²      Physical Exam  Vitals signs and nursing note reviewed.   Constitutional:       General: She is not in acute distress.     Appearance: Normal appearance. She is well-developed. She is not diaphoretic.   HENT:      Head: Normocephalic and atraumatic.      Right Ear: Hearing, tympanic membrane, ear canal and external ear normal. There is no impacted " cerumen.      Left Ear: Hearing, tympanic membrane, ear canal and external ear normal. There is no impacted cerumen.      Nose: Mucosal edema, congestion and rhinorrhea present.      Right Sinus: No maxillary sinus tenderness or frontal sinus tenderness.      Left Sinus: Maxillary sinus tenderness and frontal sinus tenderness present.      Mouth/Throat:      Mouth: Mucous membranes are moist.      Pharynx: Posterior oropharyngeal erythema present. No oropharyngeal exudate.   Eyes:      General:         Right eye: No discharge.         Left eye: No discharge.      Conjunctiva/sclera: Conjunctivae normal.      Pupils: Pupils are equal, round, and reactive to light.   Neck:      Musculoskeletal: Normal range of motion.   Cardiovascular:      Rate and Rhythm: Normal rate.      Heart sounds: Normal heart sounds. No murmur.   Pulmonary:      Effort: Pulmonary effort is normal.      Breath sounds: Normal breath sounds. No wheezing or rales.   Musculoskeletal: Normal range of motion.   Skin:     General: Skin is warm and dry.   Neurological:      Mental Status: She is alert and oriented to person, place, and time.   Psychiatric:         Behavior: Behavior normal.            PMH:  has a past medical history of History of [facility] fever.  MEDS:   Current Outpatient Medications:   •  doxycycline (VIBRAMYCIN) 100 MG Tab, Take 1 Tab by mouth 2 times a day for 7 days., Disp: 14 Tab, Rfl: 0  •  benzonatate (TESSALON) 100 MG Cap, Take 1 Cap by mouth 3 times a day as needed for Cough., Disp: 60 Cap, Rfl: 0  •  pseudoephedrine (SUDAFED) 30 MG Tab, Take 30 mg by mouth 1 time daily as needed for Congestion., Disp: , Rfl:   •  Cetirizine HCl (ZYRTEC ALLERGY) 10 MG CAPS, Take 0.5 mg by mouth every day., Disp: , Rfl:   •  indomethacin (INDOCIN) 50 MG Cap, Take 1 Cap by mouth 3 times a day as needed (pain)., Disp: 20 Cap, Rfl: 0  ALLERGIES:   Allergies   Allergen Reactions   • Pcn [Penicillins] Rash     HAS TAKEN AMOXICILLIN  BEFORE   • Soy      Swelling along face left   • Sulfa Drugs Nausea and Anxiety     SURGHX:   Past Surgical History:   Procedure Laterality Date   • CATARACT EXTRACTION WITH IOL Bilateral    • OTHER       SOCHX:  reports that she has never smoked. She has never used smokeless tobacco. She reports that she does not drink alcohol or use drugs.  FH: family history includes Cancer in her mother.       Assessment/Plan:       1. Acute non-recurrent pansinusitis  - doxycycline (VIBRAMYCIN) 100 MG Tab; Take 1 Tab by mouth 2 times a day for 7 days.  Dispense: 14 Tab; Refill: 0   - Complete full course of antibiotics as prescribed     Discussed use of nedi-pot, humidifier, OTC decongestant, and Flonase nasal spray for symptomatic relief. Call or return to office if symptoms persist or worsen. The patient demonstrated a good understanding and agreed with the treatment plan.    2. Cough  - benzonatate (TESSALON) 100 MG Cap; Take 1 Cap by mouth 3 times a day as needed for Cough.  Dispense: 60 Cap; Refill: 0

## 2021-01-01 ENCOUNTER — HOSPITAL ENCOUNTER (INPATIENT)
Facility: MEDICAL CENTER | Age: 71
LOS: 2 days | DRG: 872 | End: 2021-07-14
Attending: EMERGENCY MEDICINE | Admitting: INTERNAL MEDICINE
Payer: MEDICARE

## 2021-01-01 ENCOUNTER — HOSPITAL ENCOUNTER (EMERGENCY)
Facility: MEDICAL CENTER | Age: 71
End: 2021-06-26
Payer: MEDICARE

## 2021-01-01 ENCOUNTER — HOSPICE ADMISSION (OUTPATIENT)
Dept: HOSPICE | Facility: HOSPICE | Age: 71
End: 2021-01-01
Payer: MEDICARE

## 2021-01-01 ENCOUNTER — APPOINTMENT (OUTPATIENT)
Dept: CARDIOLOGY | Facility: MEDICAL CENTER | Age: 71
DRG: 754 | End: 2021-01-01
Attending: INTERNAL MEDICINE
Payer: MEDICARE

## 2021-01-01 ENCOUNTER — APPOINTMENT (OUTPATIENT)
Dept: RADIOLOGY | Facility: MEDICAL CENTER | Age: 71
DRG: 754 | End: 2021-01-01
Attending: INTERNAL MEDICINE
Payer: MEDICARE

## 2021-01-01 ENCOUNTER — TELEPHONE (OUTPATIENT)
Dept: URGENT CARE | Facility: CLINIC | Age: 71
End: 2021-01-01

## 2021-01-01 ENCOUNTER — HOSPITAL ENCOUNTER (EMERGENCY)
Facility: MEDICAL CENTER | Age: 71
End: 2021-06-26
Attending: EMERGENCY MEDICINE
Payer: MEDICARE

## 2021-01-01 ENCOUNTER — HOME CARE VISIT (OUTPATIENT)
Dept: HOSPICE | Facility: HOSPICE | Age: 71
End: 2021-01-01
Payer: MEDICARE

## 2021-01-01 ENCOUNTER — HOSPITAL ENCOUNTER (OUTPATIENT)
Facility: MEDICAL CENTER | Age: 71
End: 2021-05-27
Attending: NURSE PRACTITIONER
Payer: MEDICARE

## 2021-01-01 ENCOUNTER — APPOINTMENT (OUTPATIENT)
Dept: RADIOLOGY | Facility: MEDICAL CENTER | Age: 71
End: 2021-01-01
Attending: EMERGENCY MEDICINE
Payer: MEDICARE

## 2021-01-01 ENCOUNTER — HOSPITAL ENCOUNTER (EMERGENCY)
Facility: MEDICAL CENTER | Age: 71
End: 2021-07-07
Attending: EMERGENCY MEDICINE
Payer: MEDICARE

## 2021-01-01 ENCOUNTER — APPOINTMENT (OUTPATIENT)
Dept: RADIOLOGY | Facility: MEDICAL CENTER | Age: 71
End: 2021-01-01
Attending: NURSE PRACTITIONER
Payer: MEDICARE

## 2021-01-01 ENCOUNTER — HOSPITAL ENCOUNTER (INPATIENT)
Facility: MEDICAL CENTER | Age: 71
LOS: 8 days | DRG: 754 | End: 2021-07-22
Attending: INTERNAL MEDICINE | Admitting: INTERNAL MEDICINE
Payer: MEDICARE

## 2021-01-01 ENCOUNTER — APPOINTMENT (OUTPATIENT)
Dept: RADIOLOGY | Facility: MEDICAL CENTER | Age: 71
DRG: 872 | End: 2021-01-01
Attending: EMERGENCY MEDICINE
Payer: MEDICARE

## 2021-01-01 ENCOUNTER — OFFICE VISIT (OUTPATIENT)
Dept: URGENT CARE | Facility: CLINIC | Age: 71
End: 2021-01-01
Payer: MEDICARE

## 2021-01-01 VITALS
BODY MASS INDEX: 31.73 KG/M2 | TEMPERATURE: 97.5 F | OXYGEN SATURATION: 95 % | HEART RATE: 85 BPM | HEIGHT: 67 IN | RESPIRATION RATE: 18 BRPM | WEIGHT: 202.16 LBS | DIASTOLIC BLOOD PRESSURE: 78 MMHG | SYSTOLIC BLOOD PRESSURE: 158 MMHG

## 2021-01-01 VITALS
HEART RATE: 94 BPM | OXYGEN SATURATION: 93 % | HEIGHT: 67 IN | SYSTOLIC BLOOD PRESSURE: 149 MMHG | RESPIRATION RATE: 22 BRPM | TEMPERATURE: 98 F | WEIGHT: 198.41 LBS | DIASTOLIC BLOOD PRESSURE: 59 MMHG | BODY MASS INDEX: 31.14 KG/M2

## 2021-01-01 VITALS
TEMPERATURE: 98.5 F | OXYGEN SATURATION: 95 % | HEIGHT: 67 IN | WEIGHT: 200 LBS | HEART RATE: 98 BPM | BODY MASS INDEX: 31.39 KG/M2 | DIASTOLIC BLOOD PRESSURE: 98 MMHG | RESPIRATION RATE: 18 BRPM | SYSTOLIC BLOOD PRESSURE: 140 MMHG

## 2021-01-01 VITALS
OXYGEN SATURATION: 90 % | TEMPERATURE: 97.4 F | WEIGHT: 213.85 LBS | HEART RATE: 111 BPM | RESPIRATION RATE: 19 BRPM | SYSTOLIC BLOOD PRESSURE: 142 MMHG | DIASTOLIC BLOOD PRESSURE: 70 MMHG | HEIGHT: 67 IN | BODY MASS INDEX: 33.56 KG/M2

## 2021-01-01 VITALS
HEART RATE: 115 BPM | WEIGHT: 207.01 LBS | BODY MASS INDEX: 32.42 KG/M2 | TEMPERATURE: 97.7 F | RESPIRATION RATE: 16 BRPM | DIASTOLIC BLOOD PRESSURE: 57 MMHG | SYSTOLIC BLOOD PRESSURE: 104 MMHG | OXYGEN SATURATION: 82 %

## 2021-01-01 DIAGNOSIS — N95.0 POST-MENOPAUSAL BLEEDING: ICD-10-CM

## 2021-01-01 DIAGNOSIS — R93.89 ABNORMAL ULTRASOUND: ICD-10-CM

## 2021-01-01 DIAGNOSIS — A41.9 SEPSIS WITH ACUTE RENAL FAILURE WITHOUT SEPTIC SHOCK, DUE TO UNSPECIFIED ORGANISM, UNSPECIFIED ACUTE RENAL FAILURE TYPE (HCC): ICD-10-CM

## 2021-01-01 DIAGNOSIS — N39.0 ACUTE UTI: ICD-10-CM

## 2021-01-01 DIAGNOSIS — R65.20 SEPSIS WITH ACUTE RENAL FAILURE WITHOUT SEPTIC SHOCK, DUE TO UNSPECIFIED ORGANISM, UNSPECIFIED ACUTE RENAL FAILURE TYPE (HCC): ICD-10-CM

## 2021-01-01 DIAGNOSIS — N95.0 ABNORMAL VAGINAL BLEEDING IN POSTMENOPAUSAL PATIENT: ICD-10-CM

## 2021-01-01 DIAGNOSIS — N85.8 UTERINE MASS: ICD-10-CM

## 2021-01-01 DIAGNOSIS — N17.9 SEPSIS WITH ACUTE RENAL FAILURE WITHOUT SEPTIC SHOCK, DUE TO UNSPECIFIED ORGANISM, UNSPECIFIED ACUTE RENAL FAILURE TYPE (HCC): ICD-10-CM

## 2021-01-01 DIAGNOSIS — R82.998 LEUKOCYTES IN URINE: ICD-10-CM

## 2021-01-01 DIAGNOSIS — N93.9 VAGINAL BLEEDING: ICD-10-CM

## 2021-01-01 DIAGNOSIS — R10.30 LOWER ABDOMINAL PAIN: ICD-10-CM

## 2021-01-01 DIAGNOSIS — Z23 NEED FOR VACCINATION: ICD-10-CM

## 2021-01-01 DIAGNOSIS — R19.00 PELVIC MASS: ICD-10-CM

## 2021-01-01 DIAGNOSIS — N30.00 ACUTE CYSTITIS WITHOUT HEMATURIA: ICD-10-CM

## 2021-01-01 DIAGNOSIS — J90 PLEURAL EFFUSION: ICD-10-CM

## 2021-01-01 DIAGNOSIS — E86.0 DEHYDRATION: ICD-10-CM

## 2021-01-01 LAB
ALBUMIN SERPL BCP-MCNC: 2.4 G/DL (ref 3.2–4.9)
ALBUMIN SERPL BCP-MCNC: 2.6 G/DL (ref 3.2–4.9)
ALBUMIN SERPL BCP-MCNC: 2.8 G/DL (ref 3.2–4.9)
ALBUMIN SERPL BCP-MCNC: 2.9 G/DL (ref 3.2–4.9)
ALBUMIN SERPL BCP-MCNC: 2.9 G/DL (ref 3.2–4.9)
ALBUMIN SERPL BCP-MCNC: 3.8 G/DL (ref 3.2–4.9)
ALBUMIN/GLOB SERPL: 0.7 G/DL
ALBUMIN/GLOB SERPL: 0.8 G/DL
ALBUMIN/GLOB SERPL: 0.9 G/DL
ALBUMIN/GLOB SERPL: 1.3 G/DL
ALP SERPL-CCNC: 105 U/L (ref 30–99)
ALP SERPL-CCNC: 109 U/L (ref 30–99)
ALP SERPL-CCNC: 123 U/L (ref 30–99)
ALP SERPL-CCNC: 129 U/L (ref 30–99)
ALP SERPL-CCNC: 132 U/L (ref 30–99)
ALP SERPL-CCNC: 89 U/L (ref 30–99)
ALT SERPL-CCNC: 12 U/L (ref 2–50)
ALT SERPL-CCNC: 13 U/L (ref 2–50)
ALT SERPL-CCNC: 16 U/L (ref 2–50)
ALT SERPL-CCNC: 16 U/L (ref 2–50)
ALT SERPL-CCNC: 18 U/L (ref 2–50)
ALT SERPL-CCNC: 18 U/L (ref 2–50)
AMORPH CRY #/AREA URNS HPF: PRESENT /HPF
AMORPH CRY #/AREA URNS HPF: PRESENT /HPF
ANION GAP SERPL CALC-SCNC: 13 MMOL/L (ref 7–16)
ANION GAP SERPL CALC-SCNC: 15 MMOL/L (ref 7–16)
ANION GAP SERPL CALC-SCNC: 17 MMOL/L (ref 7–16)
ANION GAP SERPL CALC-SCNC: 17 MMOL/L (ref 7–16)
ANION GAP SERPL CALC-SCNC: 18 MMOL/L (ref 7–16)
ANION GAP SERPL CALC-SCNC: 19 MMOL/L (ref 7–16)
ANION GAP SERPL CALC-SCNC: 21 MMOL/L (ref 7–16)
APPEARANCE FLD: NORMAL
APPEARANCE UR: ABNORMAL
APPEARANCE UR: ABNORMAL
APPEARANCE UR: CLEAR
APPEARANCE UR: CLEAR
AST SERPL-CCNC: 19 U/L (ref 12–45)
AST SERPL-CCNC: 19 U/L (ref 12–45)
AST SERPL-CCNC: 23 U/L (ref 12–45)
AST SERPL-CCNC: 25 U/L (ref 12–45)
AST SERPL-CCNC: 29 U/L (ref 12–45)
AST SERPL-CCNC: 30 U/L (ref 12–45)
BACTERIA #/AREA URNS HPF: ABNORMAL /HPF
BACTERIA BLD CULT: NORMAL
BACTERIA BLD CULT: NORMAL
BACTERIA FLD AEROBE CULT: NORMAL
BACTERIA UR CULT: NORMAL
BACTERIA UR CULT: NORMAL
BASOPHILS # BLD AUTO: 0 % (ref 0–1.8)
BASOPHILS # BLD AUTO: 0.2 % (ref 0–1.8)
BASOPHILS # BLD AUTO: 0.3 % (ref 0–1.8)
BASOPHILS # BLD AUTO: 0.5 % (ref 0–1.8)
BASOPHILS # BLD: 0 K/UL (ref 0–0.12)
BASOPHILS # BLD: 0.04 K/UL (ref 0–0.12)
BASOPHILS # BLD: 0.05 K/UL (ref 0–0.12)
BASOPHILS # BLD: 0.05 K/UL (ref 0–0.12)
BASOPHILS # BLD: 0.07 K/UL (ref 0–0.12)
BASOPHILS # BLD: 0.08 K/UL (ref 0–0.12)
BILIRUB SERPL-MCNC: 0.5 MG/DL (ref 0.1–1.5)
BILIRUB SERPL-MCNC: 0.5 MG/DL (ref 0.1–1.5)
BILIRUB SERPL-MCNC: 0.7 MG/DL (ref 0.1–1.5)
BILIRUB SERPL-MCNC: 1 MG/DL (ref 0.1–1.5)
BILIRUB SERPL-MCNC: 1.1 MG/DL (ref 0.1–1.5)
BILIRUB SERPL-MCNC: 1.5 MG/DL (ref 0.1–1.5)
BILIRUB UR QL STRIP.AUTO: ABNORMAL
BILIRUB UR STRIP-MCNC: NORMAL MG/DL
BODY FLD TYPE: NORMAL
BUN SERPL-MCNC: 11 MG/DL (ref 8–22)
BUN SERPL-MCNC: 35 MG/DL (ref 8–22)
BUN SERPL-MCNC: 53 MG/DL (ref 8–22)
BUN SERPL-MCNC: 56 MG/DL (ref 8–22)
BUN SERPL-MCNC: 56 MG/DL (ref 8–22)
BUN SERPL-MCNC: 57 MG/DL (ref 8–22)
BUN SERPL-MCNC: 57 MG/DL (ref 8–22)
BUN SERPL-MCNC: 58 MG/DL (ref 8–22)
BUN SERPL-MCNC: 59 MG/DL (ref 8–22)
BUN SERPL-MCNC: 60 MG/DL (ref 8–22)
BUN SERPL-MCNC: 62 MG/DL (ref 8–22)
CALCIUM SERPL-MCNC: 8.1 MG/DL (ref 8.4–10.2)
CALCIUM SERPL-MCNC: 8.3 MG/DL (ref 8.4–10.2)
CALCIUM SERPL-MCNC: 8.4 MG/DL (ref 8.4–10.2)
CALCIUM SERPL-MCNC: 8.4 MG/DL (ref 8.4–10.2)
CALCIUM SERPL-MCNC: 8.5 MG/DL (ref 8.4–10.2)
CALCIUM SERPL-MCNC: 9 MG/DL (ref 8.5–10.5)
CALCIUM SERPL-MCNC: 9.2 MG/DL (ref 8.4–10.2)
CALCIUM SERPL-MCNC: 9.2 MG/DL (ref 8.5–10.5)
CALCIUM SERPL-MCNC: 9.4 MG/DL (ref 8.4–10.2)
CALCIUM SERPL-MCNC: 9.6 MG/DL (ref 8.4–10.2)
CALCIUM SERPL-MCNC: 9.6 MG/DL (ref 8.5–10.5)
CANDIDA DNA VAG QL PROBE+SIG AMP: NEGATIVE
CHLORIDE SERPL-SCNC: 101 MMOL/L (ref 96–112)
CHLORIDE SERPL-SCNC: 104 MMOL/L (ref 96–112)
CHLORIDE SERPL-SCNC: 104 MMOL/L (ref 96–112)
CHLORIDE SERPL-SCNC: 85 MMOL/L (ref 96–112)
CHLORIDE SERPL-SCNC: 87 MMOL/L (ref 96–112)
CHLORIDE SERPL-SCNC: 91 MMOL/L (ref 96–112)
CHLORIDE SERPL-SCNC: 92 MMOL/L (ref 96–112)
CHLORIDE SERPL-SCNC: 92 MMOL/L (ref 96–112)
CHLORIDE SERPL-SCNC: 93 MMOL/L (ref 96–112)
CHLORIDE SERPL-SCNC: 97 MMOL/L (ref 96–112)
CHLORIDE SERPL-SCNC: 99 MMOL/L (ref 96–112)
CO2 SERPL-SCNC: 15 MMOL/L (ref 20–33)
CO2 SERPL-SCNC: 15 MMOL/L (ref 20–33)
CO2 SERPL-SCNC: 16 MMOL/L (ref 20–33)
CO2 SERPL-SCNC: 16 MMOL/L (ref 20–33)
CO2 SERPL-SCNC: 17 MMOL/L (ref 20–33)
CO2 SERPL-SCNC: 17 MMOL/L (ref 20–33)
CO2 SERPL-SCNC: 18 MMOL/L (ref 20–33)
CO2 SERPL-SCNC: 19 MMOL/L (ref 20–33)
CO2 SERPL-SCNC: 20 MMOL/L (ref 20–33)
CO2 SERPL-SCNC: 21 MMOL/L (ref 20–33)
CO2 SERPL-SCNC: 21 MMOL/L (ref 20–33)
COLOR FLD: NORMAL
COLOR UR AUTO: NORMAL
COLOR UR: ABNORMAL
COLOR UR: ABNORMAL
COLOR UR: YELLOW
CREAT SERPL-MCNC: 0.94 MG/DL (ref 0.5–1.4)
CREAT SERPL-MCNC: 1.16 MG/DL (ref 0.5–1.4)
CREAT SERPL-MCNC: 1.19 MG/DL (ref 0.5–1.4)
CREAT SERPL-MCNC: 1.3 MG/DL (ref 0.5–1.4)
CREAT SERPL-MCNC: 1.34 MG/DL (ref 0.5–1.4)
CREAT SERPL-MCNC: 1.35 MG/DL (ref 0.5–1.4)
CREAT SERPL-MCNC: 1.48 MG/DL (ref 0.5–1.4)
CREAT SERPL-MCNC: 1.5 MG/DL (ref 0.5–1.4)
CREAT SERPL-MCNC: 1.59 MG/DL (ref 0.5–1.4)
CREAT SERPL-MCNC: 1.64 MG/DL (ref 0.5–1.4)
CREAT SERPL-MCNC: 1.9 MG/DL (ref 0.5–1.4)
CREAT UR-MCNC: 120.41 MG/DL
CYTOLOGY REG CYTOL: NORMAL
CYTOLOGY REG CYTOL: NORMAL
D DIMER PPP IA.FEU-MCNC: >20 UG/ML (FEU) (ref 0–0.5)
EOSINOPHIL # BLD AUTO: 0 K/UL (ref 0–0.51)
EOSINOPHIL # BLD AUTO: 0.01 K/UL (ref 0–0.51)
EOSINOPHIL # BLD AUTO: 0.03 K/UL (ref 0–0.51)
EOSINOPHIL # BLD AUTO: 0.09 K/UL (ref 0–0.51)
EOSINOPHIL NFR BLD: 0 % (ref 0–6.9)
EOSINOPHIL NFR BLD: 0.2 % (ref 0–6.9)
EOSINOPHIL NFR BLD: 0.9 % (ref 0–6.9)
EPI CELLS #/AREA URNS HPF: ABNORMAL /HPF
ERYTHROCYTE [DISTWIDTH] IN BLOOD BY AUTOMATED COUNT: 37 FL (ref 35.9–50)
ERYTHROCYTE [DISTWIDTH] IN BLOOD BY AUTOMATED COUNT: 38.7 FL (ref 35.9–50)
ERYTHROCYTE [DISTWIDTH] IN BLOOD BY AUTOMATED COUNT: 38.8 FL (ref 35.9–50)
ERYTHROCYTE [DISTWIDTH] IN BLOOD BY AUTOMATED COUNT: 39.8 FL (ref 35.9–50)
ERYTHROCYTE [DISTWIDTH] IN BLOOD BY AUTOMATED COUNT: 41.4 FL (ref 35.9–50)
ERYTHROCYTE [DISTWIDTH] IN BLOOD BY AUTOMATED COUNT: 43.8 FL (ref 35.9–50)
ERYTHROCYTE [DISTWIDTH] IN BLOOD BY AUTOMATED COUNT: 43.8 FL (ref 35.9–50)
ERYTHROCYTE [DISTWIDTH] IN BLOOD BY AUTOMATED COUNT: 45.5 FL (ref 35.9–50)
FUNGUS SPEC FUNGUS STN: NORMAL
G VAGINALIS DNA VAG QL PROBE+SIG AMP: NEGATIVE
GAMMA INTERFERON BACKGROUND BLD IA-ACNC: 0.02 IU/ML
GLOBULIN SER CALC-MCNC: 2.9 G/DL (ref 1.9–3.5)
GLOBULIN SER CALC-MCNC: 3 G/DL (ref 1.9–3.5)
GLOBULIN SER CALC-MCNC: 3.1 G/DL (ref 1.9–3.5)
GLOBULIN SER CALC-MCNC: 3.1 G/DL (ref 1.9–3.5)
GLOBULIN SER CALC-MCNC: 3.4 G/DL (ref 1.9–3.5)
GLOBULIN SER CALC-MCNC: 3.9 G/DL (ref 1.9–3.5)
GLUCOSE FLD-MCNC: 129 MG/DL
GLUCOSE SERPL-MCNC: 110 MG/DL (ref 65–99)
GLUCOSE SERPL-MCNC: 112 MG/DL (ref 65–99)
GLUCOSE SERPL-MCNC: 114 MG/DL (ref 65–99)
GLUCOSE SERPL-MCNC: 115 MG/DL (ref 65–99)
GLUCOSE SERPL-MCNC: 118 MG/DL (ref 65–99)
GLUCOSE SERPL-MCNC: 120 MG/DL (ref 65–99)
GLUCOSE SERPL-MCNC: 124 MG/DL (ref 65–99)
GLUCOSE SERPL-MCNC: 128 MG/DL (ref 65–99)
GLUCOSE SERPL-MCNC: 136 MG/DL (ref 65–99)
GLUCOSE SERPL-MCNC: 138 MG/DL (ref 65–99)
GLUCOSE SERPL-MCNC: 141 MG/DL (ref 65–99)
GLUCOSE UR STRIP.AUTO-MCNC: NEGATIVE MG/DL
GLUCOSE UR STRIP.AUTO-MCNC: NORMAL MG/DL
GRAM STN SPEC: NORMAL
GRAM STN SPEC: NORMAL
HCT VFR BLD AUTO: 41.6 % (ref 37–47)
HCT VFR BLD AUTO: 41.8 % (ref 37–47)
HCT VFR BLD AUTO: 42.4 % (ref 37–47)
HCT VFR BLD AUTO: 43.5 % (ref 37–47)
HCT VFR BLD AUTO: 43.9 % (ref 37–47)
HCT VFR BLD AUTO: 46.4 % (ref 37–47)
HCT VFR BLD AUTO: 47.1 % (ref 37–47)
HCT VFR BLD AUTO: 49.9 % (ref 37–47)
HGB BLD-MCNC: 13.7 G/DL (ref 12–16)
HGB BLD-MCNC: 14 G/DL (ref 12–16)
HGB BLD-MCNC: 14.3 G/DL (ref 12–16)
HGB BLD-MCNC: 14.3 G/DL (ref 12–16)
HGB BLD-MCNC: 15.1 G/DL (ref 12–16)
HGB BLD-MCNC: 15.4 G/DL (ref 12–16)
HGB BLD-MCNC: 16 G/DL (ref 12–16)
HGB BLD-MCNC: 17 G/DL (ref 12–16)
HISTIOCYTES NFR FLD: 38 %
HYALINE CASTS #/AREA URNS LPF: ABNORMAL /LPF
IMM GRANULOCYTES # BLD AUTO: 0.04 K/UL (ref 0–0.11)
IMM GRANULOCYTES # BLD AUTO: 0.12 K/UL (ref 0–0.11)
IMM GRANULOCYTES # BLD AUTO: 0.35 K/UL (ref 0–0.11)
IMM GRANULOCYTES # BLD AUTO: 0.38 K/UL (ref 0–0.11)
IMM GRANULOCYTES # BLD AUTO: 0.47 K/UL (ref 0–0.11)
IMM GRANULOCYTES NFR BLD AUTO: 0.4 % (ref 0–0.9)
IMM GRANULOCYTES NFR BLD AUTO: 0.8 % (ref 0–0.9)
IMM GRANULOCYTES NFR BLD AUTO: 1.3 % (ref 0–0.9)
IMM GRANULOCYTES NFR BLD AUTO: 1.8 % (ref 0–0.9)
IMM GRANULOCYTES NFR BLD AUTO: 1.9 % (ref 0–0.9)
INR PPP: 1.38 (ref 0.87–1.13)
KETONES UR STRIP.AUTO-MCNC: 15 MG/DL
KETONES UR STRIP.AUTO-MCNC: 15 MG/DL
KETONES UR STRIP.AUTO-MCNC: ABNORMAL MG/DL
KETONES UR STRIP.AUTO-MCNC: NORMAL MG/DL
LACTATE BLD-SCNC: 2.8 MMOL/L (ref 0.5–2)
LACTATE BLD-SCNC: 2.9 MMOL/L (ref 0.5–2)
LACTATE BLD-SCNC: 3.3 MMOL/L (ref 0.5–2)
LACTATE BLD-SCNC: 3.7 MMOL/L (ref 0.5–2)
LDH FLD L TO P-CCNC: 428 U/L
LEUKOCYTE ESTERASE UR QL STRIP.AUTO: ABNORMAL
LEUKOCYTE ESTERASE UR QL STRIP.AUTO: NORMAL
LIPASE SERPL-CCNC: 23 U/L (ref 7–58)
LV EJECT FRACT  99904: 65
LYMPHOCYTES # BLD AUTO: 0 K/UL (ref 1–4.8)
LYMPHOCYTES # BLD AUTO: 0.41 K/UL (ref 1–4.8)
LYMPHOCYTES # BLD AUTO: 0.44 K/UL (ref 1–4.8)
LYMPHOCYTES # BLD AUTO: 0.54 K/UL (ref 1–4.8)
LYMPHOCYTES # BLD AUTO: 0.97 K/UL (ref 1–4.8)
LYMPHOCYTES # BLD AUTO: 1.17 K/UL (ref 1–4.8)
LYMPHOCYTES NFR BLD: 0 % (ref 22–41)
LYMPHOCYTES NFR BLD: 1.5 % (ref 22–41)
LYMPHOCYTES NFR BLD: 11.4 % (ref 22–41)
LYMPHOCYTES NFR BLD: 2.6 % (ref 22–41)
LYMPHOCYTES NFR BLD: 3 % (ref 22–41)
LYMPHOCYTES NFR BLD: 3.8 % (ref 22–41)
LYMPHOCYTES NFR FLD: 42 %
M TB IFN-G BLD-IMP: ABNORMAL
M TB IFN-G CD4+ BCKGRND COR BLD-ACNC: 0 IU/ML
MANUAL DIFF BLD: NORMAL
MCH RBC QN AUTO: 28.5 PG (ref 27–33)
MCH RBC QN AUTO: 28.6 PG (ref 27–33)
MCH RBC QN AUTO: 28.7 PG (ref 27–33)
MCH RBC QN AUTO: 28.7 PG (ref 27–33)
MCH RBC QN AUTO: 29.1 PG (ref 27–33)
MCH RBC QN AUTO: 29.4 PG (ref 27–33)
MCHC RBC AUTO-ENTMCNC: 32.1 G/DL (ref 33.6–35)
MCHC RBC AUTO-ENTMCNC: 32.6 G/DL (ref 33.6–35)
MCHC RBC AUTO-ENTMCNC: 32.8 G/DL (ref 33.6–35)
MCHC RBC AUTO-ENTMCNC: 33 G/DL (ref 33.6–35)
MCHC RBC AUTO-ENTMCNC: 34.1 G/DL (ref 33.6–35)
MCHC RBC AUTO-ENTMCNC: 34.4 G/DL (ref 33.6–35)
MCHC RBC AUTO-ENTMCNC: 34.5 G/DL (ref 33.6–35)
MCHC RBC AUTO-ENTMCNC: 35.4 G/DL (ref 33.6–35)
MCV RBC AUTO: 83.2 FL (ref 81.4–97.8)
MCV RBC AUTO: 84.4 FL (ref 81.4–97.8)
MCV RBC AUTO: 85.4 FL (ref 81.4–97.8)
MCV RBC AUTO: 86.2 FL (ref 81.4–97.8)
MCV RBC AUTO: 87.1 FL (ref 81.4–97.8)
MCV RBC AUTO: 87.3 FL (ref 81.4–97.8)
MCV RBC AUTO: 88.2 FL (ref 81.4–97.8)
MCV RBC AUTO: 89 FL (ref 81.4–97.8)
MESOTHL CELL NFR FLD: 8 %
MICRO URNS: ABNORMAL
MITOGEN IGNF BCKGRD COR BLD-ACNC: 0.07 IU/ML
MONOCYTES # BLD AUTO: 0.88 K/UL (ref 0–0.85)
MONOCYTES # BLD AUTO: 1.34 K/UL (ref 0–0.85)
MONOCYTES # BLD AUTO: 1.63 K/UL (ref 0–0.85)
MONOCYTES # BLD AUTO: 1.72 K/UL (ref 0–0.85)
MONOCYTES # BLD AUTO: 1.78 K/UL (ref 0–0.85)
MONOCYTES # BLD AUTO: 2.04 K/UL (ref 0–0.85)
MONOCYTES NFR BLD AUTO: 11.3 % (ref 0–13.4)
MONOCYTES NFR BLD AUTO: 3.4 % (ref 0–13.4)
MONOCYTES NFR BLD AUTO: 6.4 % (ref 0–13.4)
MONOCYTES NFR BLD AUTO: 8.1 % (ref 0–13.4)
MONOCYTES NFR BLD AUTO: 8.1 % (ref 0–13.4)
MONOCYTES NFR BLD AUTO: 8.6 % (ref 0–13.4)
MONONUC CELLS NFR FLD: 10 %
MORPHOLOGY BLD-IMP: NORMAL
MUCOUS THREADS #/AREA URNS HPF: ABNORMAL /HPF
NEUTROPHILS # BLD AUTO: 12.19 K/UL (ref 2–7.15)
NEUTROPHILS # BLD AUTO: 18.43 K/UL (ref 2–7.15)
NEUTROPHILS # BLD AUTO: 21.77 K/UL (ref 2–7.15)
NEUTROPHILS # BLD AUTO: 25.25 K/UL (ref 2–7.15)
NEUTROPHILS # BLD AUTO: 38.16 K/UL (ref 2–7.15)
NEUTROPHILS # BLD AUTO: 8 K/UL (ref 2–7.15)
NEUTROPHILS NFR BLD: 78.2 % (ref 44–72)
NEUTROPHILS NFR BLD: 84.4 % (ref 44–72)
NEUTROPHILS NFR BLD: 85.9 % (ref 44–72)
NEUTROPHILS NFR BLD: 87.3 % (ref 44–72)
NEUTROPHILS NFR BLD: 90.5 % (ref 44–72)
NEUTROPHILS NFR BLD: 96.6 % (ref 44–72)
NEUTROPHILS NFR FLD: 2 %
NITRITE UR QL STRIP.AUTO: NEGATIVE
NITRITE UR QL STRIP.AUTO: NEGATIVE
NITRITE UR QL STRIP.AUTO: NORMAL
NITRITE UR QL STRIP.AUTO: POSITIVE
NRBC # BLD AUTO: 0 K/UL
NRBC BLD-RTO: 0 /100 WBC
NT-PROBNP SERPL IA-MCNC: 1384 PG/ML (ref 0–125)
NT-PROBNP SERPL IA-MCNC: 1670 PG/ML (ref 0–125)
PH FLD: 8 [PH]
PH UR STRIP.AUTO: 5 [PH] (ref 5–8)
PH UR STRIP.AUTO: 5 [PH] (ref 5–8)
PH UR STRIP.AUTO: 5.5 [PH] (ref 5–8)
PH UR STRIP.AUTO: 5.5 [PH] (ref 5–8)
PLATELET # BLD AUTO: 340 K/UL (ref 164–446)
PLATELET # BLD AUTO: 343 K/UL (ref 164–446)
PLATELET # BLD AUTO: 411 K/UL (ref 164–446)
PLATELET # BLD AUTO: 489 K/UL (ref 164–446)
PLATELET # BLD AUTO: 493 K/UL (ref 164–446)
PLATELET # BLD AUTO: 509 K/UL (ref 164–446)
PLATELET # BLD AUTO: 547 K/UL (ref 164–446)
PLATELET # BLD AUTO: 649 K/UL (ref 164–446)
PLATELET BLD QL SMEAR: NORMAL
PMV BLD AUTO: 9.2 FL (ref 9–12.9)
PMV BLD AUTO: 9.3 FL (ref 9–12.9)
PMV BLD AUTO: 9.5 FL (ref 9–12.9)
PMV BLD AUTO: 9.7 FL (ref 9–12.9)
POTASSIUM SERPL-SCNC: 4 MMOL/L (ref 3.6–5.5)
POTASSIUM SERPL-SCNC: 4.8 MMOL/L (ref 3.6–5.5)
POTASSIUM SERPL-SCNC: 4.9 MMOL/L (ref 3.6–5.5)
POTASSIUM SERPL-SCNC: 4.9 MMOL/L (ref 3.6–5.5)
POTASSIUM SERPL-SCNC: 5.1 MMOL/L (ref 3.6–5.5)
POTASSIUM SERPL-SCNC: 5.1 MMOL/L (ref 3.6–5.5)
POTASSIUM SERPL-SCNC: 5.2 MMOL/L (ref 3.6–5.5)
POTASSIUM SERPL-SCNC: 5.2 MMOL/L (ref 3.6–5.5)
POTASSIUM SERPL-SCNC: 5.3 MMOL/L (ref 3.6–5.5)
POTASSIUM SERPL-SCNC: 5.3 MMOL/L (ref 3.6–5.5)
POTASSIUM SERPL-SCNC: 5.7 MMOL/L (ref 3.6–5.5)
PROCALCITONIN SERPL-MCNC: 1.94 NG/ML
PROT FLD-MCNC: 3.6 G/DL
PROT SERPL-MCNC: 5.5 G/DL (ref 6–8.2)
PROT SERPL-MCNC: 5.5 G/DL (ref 6–8.2)
PROT SERPL-MCNC: 5.9 G/DL (ref 6–8.2)
PROT SERPL-MCNC: 6.3 G/DL (ref 6–8.2)
PROT SERPL-MCNC: 6.8 G/DL (ref 6–8.2)
PROT SERPL-MCNC: 6.8 G/DL (ref 6–8.2)
PROT UR QL STRIP: 30 MG/DL
PROT UR QL STRIP: 300 MG/DL
PROT UR QL STRIP: NEGATIVE MG/DL
PROT UR QL STRIP: NORMAL MG/DL
PROT UR-MCNC: 18 MG/DL (ref 0–15)
PROT/CREAT UR: 149 MG/G (ref 10–107)
PROTHROMBIN TIME: 16.6 SEC (ref 12–14.6)
QFT TB2 - NIL TBQ2: 0 IU/ML
RBC # BLD AUTO: 4.79 M/UL (ref 4.2–5.4)
RBC # BLD AUTO: 4.87 M/UL (ref 4.2–5.4)
RBC # BLD AUTO: 4.87 M/UL (ref 4.2–5.4)
RBC # BLD AUTO: 4.98 M/UL (ref 4.2–5.4)
RBC # BLD AUTO: 5.23 M/UL (ref 4.2–5.4)
RBC # BLD AUTO: 5.29 M/UL (ref 4.2–5.4)
RBC # BLD AUTO: 5.5 M/UL (ref 4.2–5.4)
RBC # BLD AUTO: 5.79 M/UL (ref 4.2–5.4)
RBC # FLD: NORMAL CELLS/UL
RBC # URNS HPF: ABNORMAL /HPF
RBC BLD AUTO: NORMAL
RBC UR QL AUTO: ABNORMAL
RBC UR QL AUTO: NORMAL
RHODAMINE-AURAMINE STN SPEC: NORMAL
SARS-COV+SARS-COV-2 AG RESP QL IA.RAPID: NOTDETECTED
SIGNIFICANT IND 70042: NORMAL
SITE SITE: NORMAL
SODIUM SERPL-SCNC: 122 MMOL/L (ref 135–145)
SODIUM SERPL-SCNC: 124 MMOL/L (ref 135–145)
SODIUM SERPL-SCNC: 125 MMOL/L (ref 135–145)
SODIUM SERPL-SCNC: 125 MMOL/L (ref 135–145)
SODIUM SERPL-SCNC: 126 MMOL/L (ref 135–145)
SODIUM SERPL-SCNC: 127 MMOL/L (ref 135–145)
SODIUM SERPL-SCNC: 128 MMOL/L (ref 135–145)
SODIUM SERPL-SCNC: 133 MMOL/L (ref 135–145)
SODIUM SERPL-SCNC: 134 MMOL/L (ref 135–145)
SODIUM SERPL-SCNC: 136 MMOL/L (ref 135–145)
SODIUM SERPL-SCNC: 136 MMOL/L (ref 135–145)
SOURCE SOURCE: NORMAL
SP GR UR STRIP.AUTO: 1.01
SP GR UR STRIP.AUTO: 1.02
SP GR UR STRIP.AUTO: 1.02
SP GR UR STRIP.AUTO: >=1.03
SPECIMEN SOURCE: NORMAL
T VAGINALIS DNA VAG QL PROBE+SIG AMP: NEGATIVE
TRANS CELLS URNS QL MICRO: ABNORMAL /HPF
UNIDENT CRYS URNS QL MICRO: ABNORMAL /HPF
UROBILINOGEN UR STRIP-MCNC: 0.2 MG/DL
WBC # BLD AUTO: 10.2 K/UL (ref 4.8–10.8)
WBC # BLD AUTO: 14.5 K/UL (ref 4.8–10.8)
WBC # BLD AUTO: 21.1 K/UL (ref 4.8–10.8)
WBC # BLD AUTO: 24.5 K/UL (ref 4.8–10.8)
WBC # BLD AUTO: 25.3 K/UL (ref 4.8–10.8)
WBC # BLD AUTO: 27.2 K/UL (ref 4.8–10.8)
WBC # BLD AUTO: 27.9 K/UL (ref 4.8–10.8)
WBC # BLD AUTO: 39.5 K/UL (ref 4.8–10.8)
WBC # FLD: 2041 CELLS/UL
WBC #/AREA URNS HPF: ABNORMAL /HPF

## 2021-01-01 PROCEDURE — 99233 SBSQ HOSP IP/OBS HIGH 50: CPT | Performed by: INTERNAL MEDICINE

## 2021-01-01 PROCEDURE — 85025 COMPLETE CBC W/AUTO DIFF WBC: CPT

## 2021-01-01 PROCEDURE — 83690 ASSAY OF LIPASE: CPT

## 2021-01-01 PROCEDURE — 0W993ZZ DRAINAGE OF RIGHT PLEURAL CAVITY, PERCUTANEOUS APPROACH: ICD-10-PCS | Performed by: RADIOLOGY

## 2021-01-01 PROCEDURE — 700111 HCHG RX REV CODE 636 W/ 250 OVERRIDE (IP): Performed by: INTERNAL MEDICINE

## 2021-01-01 PROCEDURE — 81002 URINALYSIS NONAUTO W/O SCOPE: CPT | Performed by: NURSE PRACTITIONER

## 2021-01-01 PROCEDURE — 84145 PROCALCITONIN (PCT): CPT

## 2021-01-01 PROCEDURE — A9270 NON-COVERED ITEM OR SERVICE: HCPCS | Performed by: INTERNAL MEDICINE

## 2021-01-01 PROCEDURE — 83986 ASSAY PH BODY FLUID NOS: CPT

## 2021-01-01 PROCEDURE — 700102 HCHG RX REV CODE 250 W/ 637 OVERRIDE(OP): Performed by: INTERNAL MEDICINE

## 2021-01-01 PROCEDURE — 97162 PT EVAL MOD COMPLEX 30 MIN: CPT

## 2021-01-01 PROCEDURE — 76856 US EXAM PELVIC COMPLETE: CPT

## 2021-01-01 PROCEDURE — 770006 HCHG ROOM/CARE - MED/SURG/GYN SEMI*

## 2021-01-01 PROCEDURE — 700105 HCHG RX REV CODE 258: Performed by: HOSPITALIST

## 2021-01-01 PROCEDURE — A9270 NON-COVERED ITEM OR SERVICE: HCPCS | Performed by: EMERGENCY MEDICINE

## 2021-01-01 PROCEDURE — 83880 ASSAY OF NATRIURETIC PEPTIDE: CPT

## 2021-01-01 PROCEDURE — 87206 SMEAR FLUORESCENT/ACID STAI: CPT

## 2021-01-01 PROCEDURE — 770020 HCHG ROOM/CARE - TELE (206)

## 2021-01-01 PROCEDURE — 700105 HCHG RX REV CODE 258: Performed by: INTERNAL MEDICINE

## 2021-01-01 PROCEDURE — 80048 BASIC METABOLIC PNL TOTAL CA: CPT

## 2021-01-01 PROCEDURE — 85027 COMPLETE CBC AUTOMATED: CPT

## 2021-01-01 PROCEDURE — 85610 PROTHROMBIN TIME: CPT

## 2021-01-01 PROCEDURE — 99214 OFFICE O/P EST MOD 30 MIN: CPT | Performed by: NURSE PRACTITIONER

## 2021-01-01 PROCEDURE — 700101 HCHG RX REV CODE 250: Performed by: INTERNAL MEDICINE

## 2021-01-01 PROCEDURE — 71046 X-RAY EXAM CHEST 2 VIEWS: CPT

## 2021-01-01 PROCEDURE — 700111 HCHG RX REV CODE 636 W/ 250 OVERRIDE (IP): Performed by: HOSPITALIST

## 2021-01-01 PROCEDURE — 700102 HCHG RX REV CODE 250 W/ 637 OVERRIDE(OP): Performed by: STUDENT IN AN ORGANIZED HEALTH CARE EDUCATION/TRAINING PROGRAM

## 2021-01-01 PROCEDURE — 80053 COMPREHEN METABOLIC PANEL: CPT

## 2021-01-01 PROCEDURE — 99497 ADVNCD CARE PLAN 30 MIN: CPT | Performed by: INTERNAL MEDICINE

## 2021-01-01 PROCEDURE — 93306 TTE W/DOPPLER COMPLETE: CPT | Mod: 26 | Performed by: INTERNAL MEDICINE

## 2021-01-01 PROCEDURE — 36415 COLL VENOUS BLD VENIPUNCTURE: CPT

## 2021-01-01 PROCEDURE — 99285 EMERGENCY DEPT VISIT HI MDM: CPT

## 2021-01-01 PROCEDURE — 99231 SBSQ HOSP IP/OBS SF/LOW 25: CPT | Performed by: INTERNAL MEDICINE

## 2021-01-01 PROCEDURE — A9270 NON-COVERED ITEM OR SERVICE: HCPCS | Performed by: HOSPITALIST

## 2021-01-01 PROCEDURE — 87015 SPECIMEN INFECT AGNT CONCNTJ: CPT | Mod: 91

## 2021-01-01 PROCEDURE — 700102 HCHG RX REV CODE 250 W/ 637 OVERRIDE(OP): Performed by: EMERGENCY MEDICINE

## 2021-01-01 PROCEDURE — 84157 ASSAY OF PROTEIN OTHER: CPT

## 2021-01-01 PROCEDURE — 87040 BLOOD CULTURE FOR BACTERIA: CPT

## 2021-01-01 PROCEDURE — 84156 ASSAY OF PROTEIN URINE: CPT

## 2021-01-01 PROCEDURE — 71045 X-RAY EXAM CHEST 1 VIEW: CPT

## 2021-01-01 PROCEDURE — 88112 CYTOPATH CELL ENHANCE TECH: CPT

## 2021-01-01 PROCEDURE — 88305 TISSUE EXAM BY PATHOLOGIST: CPT

## 2021-01-01 PROCEDURE — 81001 URINALYSIS AUTO W/SCOPE: CPT

## 2021-01-01 PROCEDURE — 700111 HCHG RX REV CODE 636 W/ 250 OVERRIDE (IP): Performed by: EMERGENCY MEDICINE

## 2021-01-01 PROCEDURE — 93970 EXTREMITY STUDY: CPT

## 2021-01-01 PROCEDURE — 4410569 US-THORACENTESIS PUNCTURE

## 2021-01-01 PROCEDURE — 700102 HCHG RX REV CODE 250 W/ 637 OVERRIDE(OP): Performed by: HOSPITALIST

## 2021-01-01 PROCEDURE — 302146: Performed by: INTERNAL MEDICINE

## 2021-01-01 PROCEDURE — 97165 OT EVAL LOW COMPLEX 30 MIN: CPT

## 2021-01-01 PROCEDURE — 770004 HCHG ROOM/CARE - ONCOLOGY PRIVATE *

## 2021-01-01 PROCEDURE — 87086 URINE CULTURE/COLONY COUNT: CPT

## 2021-01-01 PROCEDURE — 93306 TTE W/DOPPLER COMPLETE: CPT

## 2021-01-01 PROCEDURE — 87426 SARSCOV CORONAVIRUS AG IA: CPT

## 2021-01-01 PROCEDURE — 87070 CULTURE OTHR SPECIMN AEROBIC: CPT

## 2021-01-01 PROCEDURE — 96365 THER/PROPH/DIAG IV INF INIT: CPT

## 2021-01-01 PROCEDURE — A9270 NON-COVERED ITEM OR SERVICE: HCPCS | Performed by: STUDENT IN AN ORGANIZED HEALTH CARE EDUCATION/TRAINING PROGRAM

## 2021-01-01 PROCEDURE — 83605 ASSAY OF LACTIC ACID: CPT

## 2021-01-01 PROCEDURE — 87102 FUNGUS ISOLATION CULTURE: CPT

## 2021-01-01 PROCEDURE — 99232 SBSQ HOSP IP/OBS MODERATE 35: CPT | Performed by: INTERNAL MEDICINE

## 2021-01-01 PROCEDURE — 87480 CANDIDA DNA DIR PROBE: CPT

## 2021-01-01 PROCEDURE — 82570 ASSAY OF URINE CREATININE: CPT

## 2021-01-01 PROCEDURE — 87660 TRICHOMONAS VAGIN DIR PROBE: CPT

## 2021-01-01 PROCEDURE — 700105 HCHG RX REV CODE 258: Performed by: EMERGENCY MEDICINE

## 2021-01-01 PROCEDURE — 82945 GLUCOSE OTHER FLUID: CPT

## 2021-01-01 PROCEDURE — 87205 SMEAR GRAM STAIN: CPT | Mod: 91

## 2021-01-01 PROCEDURE — 85007 BL SMEAR W/DIFF WBC COUNT: CPT

## 2021-01-01 PROCEDURE — 99284 EMERGENCY DEPT VISIT MOD MDM: CPT

## 2021-01-01 PROCEDURE — 74176 CT ABD & PELVIS W/O CONTRAST: CPT | Mod: MG

## 2021-01-01 PROCEDURE — 86480 TB TEST CELL IMMUN MEASURE: CPT

## 2021-01-01 PROCEDURE — 87116 MYCOBACTERIA CULTURE: CPT

## 2021-01-01 PROCEDURE — 83615 LACTATE (LD) (LDH) ENZYME: CPT

## 2021-01-01 PROCEDURE — 87510 GARDNER VAG DNA DIR PROBE: CPT

## 2021-01-01 PROCEDURE — 89051 BODY FLUID CELL COUNT: CPT

## 2021-01-01 PROCEDURE — 307059 PAD,EAR PROTECTOR: Performed by: INTERNAL MEDICINE

## 2021-01-01 PROCEDURE — 99223 1ST HOSP IP/OBS HIGH 75: CPT | Performed by: HOSPITALIST

## 2021-01-01 PROCEDURE — 85379 FIBRIN DEGRADATION QUANT: CPT

## 2021-01-01 RX ORDER — HYDROMORPHONE HYDROCHLORIDE 1 MG/ML
0.25 INJECTION, SOLUTION INTRAMUSCULAR; INTRAVENOUS; SUBCUTANEOUS
Status: CANCELLED | OUTPATIENT
Start: 2021-01-01

## 2021-01-01 RX ORDER — AMOXICILLIN 250 MG
2 CAPSULE ORAL 2 TIMES DAILY
Status: CANCELLED | OUTPATIENT
Start: 2021-01-01

## 2021-01-01 RX ORDER — OXYCODONE HYDROCHLORIDE 5 MG/1
5 TABLET ORAL
Status: DISCONTINUED | OUTPATIENT
Start: 2021-01-01 | End: 2021-01-01 | Stop reason: HOSPADM

## 2021-01-01 RX ORDER — OXYCODONE HYDROCHLORIDE 5 MG/1
5 TABLET ORAL
Status: DISCONTINUED | OUTPATIENT
Start: 2021-01-01 | End: 2021-01-01

## 2021-01-01 RX ORDER — AMOXICILLIN 250 MG
2 CAPSULE ORAL 2 TIMES DAILY
Status: DISCONTINUED | OUTPATIENT
Start: 2021-01-01 | End: 2021-01-01 | Stop reason: HOSPADM

## 2021-01-01 RX ORDER — MORPHINE SULFATE 100 MG/5ML
10 SOLUTION ORAL
Status: DISCONTINUED | OUTPATIENT
Start: 2021-01-01 | End: 2021-01-01 | Stop reason: HOSPADM

## 2021-01-01 RX ORDER — LORAZEPAM 2 MG/ML
1 INJECTION INTRAMUSCULAR EVERY 4 HOURS PRN
Status: DISCONTINUED | OUTPATIENT
Start: 2021-01-01 | End: 2021-01-01

## 2021-01-01 RX ORDER — ONDANSETRON 2 MG/ML
4 INJECTION INTRAMUSCULAR; INTRAVENOUS EVERY 4 HOURS PRN
Status: DISCONTINUED | OUTPATIENT
Start: 2021-01-01 | End: 2021-01-01

## 2021-01-01 RX ORDER — ONDANSETRON 8 MG/1
8 TABLET, ORALLY DISINTEGRATING ORAL EVERY 8 HOURS PRN
Status: DISCONTINUED | OUTPATIENT
Start: 2021-01-01 | End: 2021-01-01 | Stop reason: HOSPADM

## 2021-01-01 RX ORDER — POLYETHYLENE GLYCOL 3350 17 G/17G
1 POWDER, FOR SOLUTION ORAL
Status: DISCONTINUED | OUTPATIENT
Start: 2021-01-01 | End: 2021-01-01 | Stop reason: HOSPADM

## 2021-01-01 RX ORDER — SODIUM CHLORIDE 9 MG/ML
1000 INJECTION, SOLUTION INTRAVENOUS ONCE
Status: COMPLETED | OUTPATIENT
Start: 2021-01-01 | End: 2021-01-01

## 2021-01-01 RX ORDER — MORPHINE SULFATE 4 MG/ML
4 INJECTION, SOLUTION INTRAMUSCULAR; INTRAVENOUS ONCE
Status: DISCONTINUED | OUTPATIENT
Start: 2021-01-01 | End: 2021-01-01

## 2021-01-01 RX ORDER — OXYCODONE HYDROCHLORIDE 10 MG/1
10 TABLET ORAL
Status: DISCONTINUED | OUTPATIENT
Start: 2021-01-01 | End: 2021-01-01

## 2021-01-01 RX ORDER — TRAMADOL HYDROCHLORIDE 50 MG/1
50 TABLET ORAL EVERY 6 HOURS PRN
Status: DISCONTINUED | OUTPATIENT
Start: 2021-01-01 | End: 2021-01-01

## 2021-01-01 RX ORDER — CETIRIZINE HYDROCHLORIDE 10 MG/1
10 TABLET ORAL
Status: DISCONTINUED | OUTPATIENT
Start: 2021-01-01 | End: 2021-01-01 | Stop reason: HOSPADM

## 2021-01-01 RX ORDER — OXYCODONE HYDROCHLORIDE 5 MG/1
2.5 TABLET ORAL
Status: DISCONTINUED | OUTPATIENT
Start: 2021-01-01 | End: 2021-01-01

## 2021-01-01 RX ORDER — AMOXICILLIN AND CLAVULANATE POTASSIUM 875; 125 MG/1; MG/1
1 TABLET, FILM COATED ORAL 2 TIMES DAILY
Qty: 20 TABLET | Refills: 0 | Status: SHIPPED | OUTPATIENT
Start: 2021-01-01 | End: 2021-01-01

## 2021-01-01 RX ORDER — POLYETHYLENE GLYCOL 3350 17 G/17G
1 POWDER, FOR SOLUTION ORAL
Status: CANCELLED | OUTPATIENT
Start: 2021-01-01

## 2021-01-01 RX ORDER — HYDROMORPHONE HYDROCHLORIDE 1 MG/ML
1 INJECTION, SOLUTION INTRAMUSCULAR; INTRAVENOUS; SUBCUTANEOUS
Status: DISCONTINUED | OUTPATIENT
Start: 2021-01-01 | End: 2021-01-01 | Stop reason: HOSPADM

## 2021-01-01 RX ORDER — OXYCODONE HYDROCHLORIDE 5 MG/1
2.5 TABLET ORAL
Status: CANCELLED | OUTPATIENT
Start: 2021-01-01

## 2021-01-01 RX ORDER — CETIRIZINE HYDROCHLORIDE 10 MG/1
10 TABLET ORAL 2 TIMES DAILY PRN
Status: DISCONTINUED | OUTPATIENT
Start: 2021-01-01 | End: 2021-01-01 | Stop reason: HOSPADM

## 2021-01-01 RX ORDER — LORAZEPAM 2 MG/ML
1 INJECTION INTRAMUSCULAR
Status: DISCONTINUED | OUTPATIENT
Start: 2021-01-01 | End: 2021-01-01 | Stop reason: HOSPADM

## 2021-01-01 RX ORDER — OXYCODONE HYDROCHLORIDE 5 MG/1
5 TABLET ORAL
Status: CANCELLED | OUTPATIENT
Start: 2021-01-01

## 2021-01-01 RX ORDER — ACETAMINOPHEN 325 MG/1
650 TABLET ORAL EVERY 4 HOURS PRN
Status: DISCONTINUED | OUTPATIENT
Start: 2021-01-01 | End: 2021-01-01

## 2021-01-01 RX ORDER — BISACODYL 10 MG
10 SUPPOSITORY, RECTAL RECTAL
Status: DISCONTINUED | OUTPATIENT
Start: 2021-01-01 | End: 2021-01-01 | Stop reason: HOSPADM

## 2021-01-01 RX ORDER — SODIUM CHLORIDE 9 MG/ML
INJECTION, SOLUTION INTRAVENOUS CONTINUOUS
Status: DISCONTINUED | OUTPATIENT
Start: 2021-01-01 | End: 2021-01-01

## 2021-01-01 RX ORDER — TRAMADOL HYDROCHLORIDE 50 MG/1
50 TABLET ORAL EVERY 6 HOURS PRN
Qty: 12 TABLET | Refills: 0 | Status: SHIPPED | OUTPATIENT
Start: 2021-01-01 | End: 2021-01-01

## 2021-01-01 RX ORDER — TRAMADOL HYDROCHLORIDE 50 MG/1
50 TABLET ORAL EVERY 6 HOURS PRN
Status: DISCONTINUED | OUTPATIENT
Start: 2021-01-01 | End: 2021-01-01 | Stop reason: HOSPADM

## 2021-01-01 RX ORDER — HYDROMORPHONE HYDROCHLORIDE 2 MG/ML
2 INJECTION, SOLUTION INTRAMUSCULAR; INTRAVENOUS; SUBCUTANEOUS EVERY 4 HOURS PRN
Status: DISCONTINUED | OUTPATIENT
Start: 2021-01-01 | End: 2021-01-01

## 2021-01-01 RX ORDER — CALCIUM CARBONATE 500 MG/1
500 TABLET, CHEWABLE ORAL DAILY
Status: DISCONTINUED | OUTPATIENT
Start: 2021-01-01 | End: 2021-01-01

## 2021-01-01 RX ORDER — POTASSIUM CHLORIDE 20 MEQ/1
20 TABLET, EXTENDED RELEASE ORAL 2 TIMES DAILY
Status: DISCONTINUED | OUTPATIENT
Start: 2021-01-01 | End: 2021-01-01 | Stop reason: HOSPADM

## 2021-01-01 RX ORDER — SODIUM CHLORIDE 9 MG/ML
INJECTION, SOLUTION INTRAVENOUS CONTINUOUS
Status: CANCELLED | OUTPATIENT
Start: 2021-01-01

## 2021-01-01 RX ORDER — MORPHINE SULFATE 4 MG/ML
4 INJECTION, SOLUTION INTRAMUSCULAR; INTRAVENOUS ONCE
Status: DISCONTINUED | OUTPATIENT
Start: 2021-01-01 | End: 2021-01-01 | Stop reason: HOSPADM

## 2021-01-01 RX ORDER — CETIRIZINE HYDROCHLORIDE 10 MG/1
10 TABLET ORAL
Status: CANCELLED | OUTPATIENT
Start: 2021-01-01

## 2021-01-01 RX ORDER — HYDROMORPHONE HYDROCHLORIDE 1 MG/ML
0.25 INJECTION, SOLUTION INTRAMUSCULAR; INTRAVENOUS; SUBCUTANEOUS
Status: DISCONTINUED | OUTPATIENT
Start: 2021-01-01 | End: 2021-01-01

## 2021-01-01 RX ORDER — ASPIRIN 325 MG
325 TABLET ORAL EVERY 6 HOURS PRN
Status: SHIPPED | COMMUNITY
End: 2021-01-01

## 2021-01-01 RX ORDER — MORPHINE SULFATE 100 MG/5ML
20 SOLUTION ORAL
Status: DISCONTINUED | OUTPATIENT
Start: 2021-01-01 | End: 2021-01-01 | Stop reason: HOSPADM

## 2021-01-01 RX ORDER — DOXYCYCLINE 100 MG/1
100 TABLET ORAL EVERY 12 HOURS
Status: DISCONTINUED | OUTPATIENT
Start: 2021-01-01 | End: 2021-01-01

## 2021-01-01 RX ORDER — PHENAZOPYRIDINE HYDROCHLORIDE 200 MG/1
100 TABLET, FILM COATED ORAL ONCE
Status: DISCONTINUED | OUTPATIENT
Start: 2021-01-01 | End: 2021-01-01 | Stop reason: DRUGHIGH

## 2021-01-01 RX ORDER — HYDROMORPHONE HYDROCHLORIDE 2 MG/1
2 TABLET ORAL EVERY 4 HOURS PRN
Status: DISCONTINUED | OUTPATIENT
Start: 2021-01-01 | End: 2021-01-01

## 2021-01-01 RX ORDER — FLUCONAZOLE 200 MG/1
200 TABLET ORAL DAILY
Status: DISCONTINUED | OUTPATIENT
Start: 2021-01-01 | End: 2021-01-01

## 2021-01-01 RX ORDER — ACETAMINOPHEN 325 MG/1
325-650 TABLET ORAL EVERY 4 HOURS PRN
Status: CANCELLED | OUTPATIENT
Start: 2021-01-01

## 2021-01-01 RX ORDER — PHENAZOPYRIDINE HYDROCHLORIDE 200 MG/1
200 TABLET, FILM COATED ORAL ONCE
Status: COMPLETED | OUTPATIENT
Start: 2021-01-01 | End: 2021-01-01

## 2021-01-01 RX ORDER — QUETIAPINE FUMARATE 25 MG/1
25 TABLET, FILM COATED ORAL 2 TIMES DAILY
Status: DISCONTINUED | OUTPATIENT
Start: 2021-01-01 | End: 2021-01-01

## 2021-01-01 RX ORDER — ONDANSETRON 2 MG/ML
4 INJECTION INTRAMUSCULAR; INTRAVENOUS EVERY 4 HOURS PRN
Status: DISCONTINUED | OUTPATIENT
Start: 2021-01-01 | End: 2021-01-01 | Stop reason: HOSPADM

## 2021-01-01 RX ORDER — ATROPINE SULFATE 10 MG/ML
2 SOLUTION/ DROPS OPHTHALMIC EVERY 4 HOURS PRN
Status: DISCONTINUED | OUTPATIENT
Start: 2021-01-01 | End: 2021-01-01 | Stop reason: HOSPADM

## 2021-01-01 RX ORDER — ONDANSETRON 2 MG/ML
4 INJECTION INTRAMUSCULAR; INTRAVENOUS ONCE
Status: DISCONTINUED | OUTPATIENT
Start: 2021-01-01 | End: 2021-01-01 | Stop reason: HOSPADM

## 2021-01-01 RX ORDER — HYDROMORPHONE HYDROCHLORIDE 4 MG/1
4 TABLET ORAL EVERY 4 HOURS PRN
Status: DISCONTINUED | OUTPATIENT
Start: 2021-01-01 | End: 2021-01-01

## 2021-01-01 RX ORDER — OXYCODONE HYDROCHLORIDE 5 MG/1
2.5 TABLET ORAL
Status: DISCONTINUED | OUTPATIENT
Start: 2021-01-01 | End: 2021-01-01 | Stop reason: HOSPADM

## 2021-01-01 RX ORDER — ONDANSETRON 2 MG/ML
4 INJECTION INTRAMUSCULAR; INTRAVENOUS EVERY 4 HOURS PRN
Status: CANCELLED | OUTPATIENT
Start: 2021-01-01

## 2021-01-01 RX ORDER — ACETAMINOPHEN 325 MG/1
325-650 TABLET ORAL EVERY 4 HOURS PRN
Status: DISCONTINUED | OUTPATIENT
Start: 2021-01-01 | End: 2021-01-01 | Stop reason: HOSPADM

## 2021-01-01 RX ORDER — SODIUM CHLORIDE 9 MG/ML
INJECTION, SOLUTION INTRAVENOUS CONTINUOUS
Status: DISCONTINUED | OUTPATIENT
Start: 2021-01-01 | End: 2021-01-01 | Stop reason: HOSPADM

## 2021-01-01 RX ORDER — FUROSEMIDE 10 MG/ML
40 INJECTION INTRAMUSCULAR; INTRAVENOUS
Status: DISCONTINUED | OUTPATIENT
Start: 2021-01-01 | End: 2021-01-01 | Stop reason: HOSPADM

## 2021-01-01 RX ORDER — BISACODYL 10 MG
10 SUPPOSITORY, RECTAL RECTAL
Status: CANCELLED | OUTPATIENT
Start: 2021-01-01

## 2021-01-01 RX ORDER — ACETAMINOPHEN 500 MG
1000 TABLET ORAL ONCE
Status: COMPLETED | OUTPATIENT
Start: 2021-01-01 | End: 2021-01-01

## 2021-01-01 RX ORDER — POLYVINYL ALCOHOL 14 MG/ML
2 SOLUTION/ DROPS OPHTHALMIC EVERY 6 HOURS PRN
Status: DISCONTINUED | OUTPATIENT
Start: 2021-01-01 | End: 2021-01-01 | Stop reason: HOSPADM

## 2021-01-01 RX ORDER — CETIRIZINE HYDROCHLORIDE 10 MG/1
10 TABLET ORAL
Status: DISCONTINUED | OUTPATIENT
Start: 2021-01-01 | End: 2021-01-01

## 2021-01-01 RX ORDER — ONDANSETRON 2 MG/ML
8 INJECTION INTRAMUSCULAR; INTRAVENOUS EVERY 8 HOURS PRN
Status: DISCONTINUED | OUTPATIENT
Start: 2021-01-01 | End: 2021-01-01 | Stop reason: HOSPADM

## 2021-01-01 RX ORDER — HYDROMORPHONE HYDROCHLORIDE 1 MG/ML
0.25 INJECTION, SOLUTION INTRAMUSCULAR; INTRAVENOUS; SUBCUTANEOUS
Status: DISCONTINUED | OUTPATIENT
Start: 2021-01-01 | End: 2021-01-01 | Stop reason: HOSPADM

## 2021-01-01 RX ORDER — OXYCODONE HCL 10 MG/1
10 TABLET, FILM COATED, EXTENDED RELEASE ORAL ONCE
Status: COMPLETED | OUTPATIENT
Start: 2021-01-01 | End: 2021-01-01

## 2021-01-01 RX ORDER — MICONAZOLE NITRATE 20 MG/G
CREAM TOPICAL 2 TIMES DAILY
Status: DISCONTINUED | OUTPATIENT
Start: 2021-01-01 | End: 2021-01-01

## 2021-01-01 RX ORDER — CETIRIZINE HYDROCHLORIDE 10 MG/1
10 TABLET ORAL
COMMUNITY

## 2021-01-01 RX ORDER — OXYCODONE HYDROCHLORIDE 5 MG/1
2.5 TABLET ORAL
Status: DISCONTINUED | OUTPATIENT
Start: 2021-01-01 | End: 2021-01-01 | Stop reason: ALTCHOICE

## 2021-01-01 RX ORDER — PHENAZOPYRIDINE HYDROCHLORIDE 200 MG/1
200 TABLET, FILM COATED ORAL 3 TIMES DAILY PRN
Qty: 6 TABLET | Refills: 0 | Status: SHIPPED | OUTPATIENT
Start: 2021-01-01 | End: 2021-01-01

## 2021-01-01 RX ORDER — SODIUM CHLORIDE 9 MG/ML
1000 INJECTION, SOLUTION INTRAVENOUS ONCE
Status: ACTIVE | OUTPATIENT
Start: 2021-01-01 | End: 2021-01-01

## 2021-01-01 RX ORDER — FENTANYL 12.5 UG/1
1 PATCH TRANSDERMAL
Status: DISCONTINUED | OUTPATIENT
Start: 2021-01-01 | End: 2021-01-01 | Stop reason: HOSPADM

## 2021-01-01 RX ORDER — CALCIUM CARBONATE 500 MG/1
500 TABLET, CHEWABLE ORAL 3 TIMES DAILY PRN
Status: DISCONTINUED | OUTPATIENT
Start: 2021-01-01 | End: 2021-01-01 | Stop reason: HOSPADM

## 2021-01-01 RX ORDER — LORAZEPAM 2 MG/ML
1 CONCENTRATE ORAL
Status: DISCONTINUED | OUTPATIENT
Start: 2021-01-01 | End: 2021-01-01 | Stop reason: HOSPADM

## 2021-01-01 RX ORDER — ONDANSETRON 2 MG/ML
4 INJECTION INTRAMUSCULAR; INTRAVENOUS ONCE
Status: DISCONTINUED | OUTPATIENT
Start: 2021-01-01 | End: 2021-01-01

## 2021-01-01 RX ORDER — LORAZEPAM 1 MG/1
1 TABLET ORAL EVERY 4 HOURS PRN
Status: DISCONTINUED | OUTPATIENT
Start: 2021-01-01 | End: 2021-01-01

## 2021-01-01 RX ORDER — HYPOCHLOROUS ACID/SODIUM CHLOR 0.01 %
SPRAY, NON-AEROSOL (ML) TOPICAL
COMMUNITY
Start: 2021-01-01 | End: 2021-01-01

## 2021-01-01 RX ADMIN — ANTACID TABLETS 500 MG: 500 TABLET, CHEWABLE ORAL at 00:39

## 2021-01-01 RX ADMIN — CEFTRIAXONE SODIUM 2 G: 2 INJECTION, POWDER, FOR SOLUTION INTRAMUSCULAR; INTRAVENOUS at 12:04

## 2021-01-01 RX ADMIN — HYDROMORPHONE HYDROCHLORIDE 2 MG: 2 TABLET ORAL at 13:46

## 2021-01-01 RX ADMIN — CEFTRIAXONE SODIUM 1 G: 1 INJECTION, POWDER, FOR SOLUTION INTRAMUSCULAR; INTRAVENOUS at 13:13

## 2021-01-01 RX ADMIN — ONDANSETRON 8 MG: 2 INJECTION INTRAMUSCULAR; INTRAVENOUS at 12:03

## 2021-01-01 RX ADMIN — LORAZEPAM 1 MG: 2 INJECTION INTRAMUSCULAR; INTRAVENOUS at 11:08

## 2021-01-01 RX ADMIN — SODIUM CHLORIDE: 9 INJECTION, SOLUTION INTRAVENOUS at 03:49

## 2021-01-01 RX ADMIN — OXYCODONE 2.5 MG: 5 TABLET ORAL at 04:07

## 2021-01-01 RX ADMIN — POTASSIUM CHLORIDE 20 MEQ: 1500 TABLET, EXTENDED RELEASE ORAL at 17:50

## 2021-01-01 RX ADMIN — FUROSEMIDE 40 MG: 10 INJECTION, SOLUTION INTRAVENOUS at 09:06

## 2021-01-01 RX ADMIN — CEFTRIAXONE SODIUM 2 G: 10 INJECTION, POWDER, FOR SOLUTION INTRAVENOUS at 05:14

## 2021-01-01 RX ADMIN — HYDROMORPHONE HYDROCHLORIDE 1 MG: 1 INJECTION, SOLUTION INTRAMUSCULAR; INTRAVENOUS; SUBCUTANEOUS at 12:20

## 2021-01-01 RX ADMIN — LORAZEPAM 1 MG: 2 INJECTION INTRAMUSCULAR; INTRAVENOUS at 02:12

## 2021-01-01 RX ADMIN — ACETAMINOPHEN 650 MG: 325 TABLET, FILM COATED ORAL at 17:22

## 2021-01-01 RX ADMIN — OXYCODONE HYDROCHLORIDE 10 MG: 10 TABLET ORAL at 12:34

## 2021-01-01 RX ADMIN — LORAZEPAM 1 MG: 2 INJECTION INTRAMUSCULAR; INTRAVENOUS at 12:20

## 2021-01-01 RX ADMIN — MORPHINE SULFATE 20 MG: 100 SOLUTION ORAL at 22:16

## 2021-01-01 RX ADMIN — FUROSEMIDE 40 MG: 10 INJECTION, SOLUTION INTRAVENOUS at 06:47

## 2021-01-01 RX ADMIN — MORPHINE SULFATE 20 MG: 100 SOLUTION ORAL at 03:48

## 2021-01-01 RX ADMIN — MINERAL OIL, PETROLATUM 1 APPLICATION: 425; 568 OINTMENT OPHTHALMIC at 10:45

## 2021-01-01 RX ADMIN — SODIUM CHLORIDE 1000 ML: 9 INJECTION, SOLUTION INTRAVENOUS at 11:30

## 2021-01-01 RX ADMIN — HYDROMORPHONE HYDROCHLORIDE 2 MG: 2 TABLET ORAL at 17:50

## 2021-01-01 RX ADMIN — LORAZEPAM 1 MG: 2 SOLUTION, CONCENTRATE ORAL at 14:04

## 2021-01-01 RX ADMIN — LORAZEPAM 1 MG: 2 SOLUTION, CONCENTRATE ORAL at 01:10

## 2021-01-01 RX ADMIN — FUROSEMIDE 40 MG: 10 INJECTION, SOLUTION INTRAVENOUS at 05:32

## 2021-01-01 RX ADMIN — DOCUSATE SODIUM 50 MG AND SENNOSIDES 8.6 MG 2 TABLET: 8.6; 5 TABLET, FILM COATED ORAL at 05:13

## 2021-01-01 RX ADMIN — ACETAMINOPHEN 650 MG: 325 TABLET, FILM COATED ORAL at 20:29

## 2021-01-01 RX ADMIN — TRAMADOL HYDROCHLORIDE 50 MG: 50 TABLET, FILM COATED ORAL at 10:25

## 2021-01-01 RX ADMIN — CETIRIZINE HYDROCHLORIDE 10 MG: 10 TABLET, FILM COATED ORAL at 04:28

## 2021-01-01 RX ADMIN — ACETAMINOPHEN 325 MG: 325 TABLET, FILM COATED ORAL at 23:28

## 2021-01-01 RX ADMIN — MINERAL OIL, PETROLATUM 1 APPLICATION: 425; 568 OINTMENT OPHTHALMIC at 14:00

## 2021-01-01 RX ADMIN — METRONIDAZOLE 500 MG: 500 INJECTION, SOLUTION INTRAVENOUS at 14:08

## 2021-01-01 RX ADMIN — CETIRIZINE HYDROCHLORIDE 10 MG: 10 TABLET, FILM COATED ORAL at 01:48

## 2021-01-01 RX ADMIN — ACETAMINOPHEN 650 MG: 325 TABLET, FILM COATED ORAL at 04:37

## 2021-01-01 RX ADMIN — MINERAL OIL, PETROLATUM 1 APPLICATION: 425; 568 OINTMENT OPHTHALMIC at 22:00

## 2021-01-01 RX ADMIN — MINERAL OIL, PETROLATUM 1 APPLICATION: 425; 568 OINTMENT OPHTHALMIC at 06:00

## 2021-01-01 RX ADMIN — ONDANSETRON 8 MG: 2 INJECTION INTRAMUSCULAR; INTRAVENOUS at 13:34

## 2021-01-01 RX ADMIN — LORAZEPAM 1 MG: 2 SOLUTION, CONCENTRATE ORAL at 05:28

## 2021-01-01 RX ADMIN — FENTANYL 1 PATCH: 12 PATCH TRANSDERMAL at 17:10

## 2021-01-01 RX ADMIN — DOCUSATE SODIUM 50 MG AND SENNOSIDES 8.6 MG 2 TABLET: 8.6; 5 TABLET, FILM COATED ORAL at 17:50

## 2021-01-01 RX ADMIN — LORAZEPAM 1 MG: 2 INJECTION INTRAMUSCULAR; INTRAVENOUS at 06:47

## 2021-01-01 RX ADMIN — FENTANYL 1 PATCH: 12 PATCH TRANSDERMAL at 17:26

## 2021-01-01 RX ADMIN — PHENAZOPYRIDINE HYDROCHLORIDE 200 MG: 200 TABLET ORAL at 14:31

## 2021-01-01 RX ADMIN — FUROSEMIDE 40 MG: 10 INJECTION, SOLUTION INTRAVENOUS at 05:29

## 2021-01-01 RX ADMIN — ACETAMINOPHEN 325 MG: 325 TABLET, FILM COATED ORAL at 02:02

## 2021-01-01 RX ADMIN — CEFTRIAXONE SODIUM 2 G: 2 INJECTION, POWDER, FOR SOLUTION INTRAMUSCULAR; INTRAVENOUS at 16:06

## 2021-01-01 RX ADMIN — HYDROMORPHONE HYDROCHLORIDE 4 MG: 4 TABLET ORAL at 16:55

## 2021-01-01 RX ADMIN — FUROSEMIDE 40 MG: 10 INJECTION, SOLUTION INTRAVENOUS at 11:24

## 2021-01-01 RX ADMIN — FUROSEMIDE 40 MG: 10 INJECTION, SOLUTION INTRAVENOUS at 05:28

## 2021-01-01 RX ADMIN — OXYCODONE 2.5 MG: 5 TABLET ORAL at 08:50

## 2021-01-01 RX ADMIN — HYDROMORPHONE HYDROCHLORIDE 4 MG: 4 TABLET ORAL at 11:26

## 2021-01-01 RX ADMIN — SODIUM CHLORIDE 1000 ML: 9 INJECTION, SOLUTION INTRAVENOUS at 16:06

## 2021-01-01 RX ADMIN — HYDROMORPHONE HYDROCHLORIDE 2 MG: 2 TABLET ORAL at 03:12

## 2021-01-01 RX ADMIN — HYDROMORPHONE HYDROCHLORIDE 0.25 MG: 1 INJECTION, SOLUTION INTRAMUSCULAR; INTRAVENOUS; SUBCUTANEOUS at 02:13

## 2021-01-01 RX ADMIN — HYDROMORPHONE HYDROCHLORIDE 2 MG: 2 TABLET ORAL at 03:11

## 2021-01-01 RX ADMIN — CEFTRIAXONE SODIUM 2 G: 2 INJECTION, POWDER, FOR SOLUTION INTRAMUSCULAR; INTRAVENOUS at 05:38

## 2021-01-01 RX ADMIN — HYDROMORPHONE HYDROCHLORIDE 2 MG: 2 TABLET ORAL at 17:31

## 2021-01-01 RX ADMIN — HYDROMORPHONE HYDROCHLORIDE 1 MG: 1 INJECTION, SOLUTION INTRAMUSCULAR; INTRAVENOUS; SUBCUTANEOUS at 14:31

## 2021-01-01 RX ADMIN — HYDROMORPHONE HYDROCHLORIDE 2 MG: 2 TABLET ORAL at 09:35

## 2021-01-01 RX ADMIN — DOCUSATE SODIUM 50 MG AND SENNOSIDES 8.6 MG 2 TABLET: 8.6; 5 TABLET, FILM COATED ORAL at 05:28

## 2021-01-01 RX ADMIN — ONDANSETRON 4 MG: 2 INJECTION INTRAMUSCULAR; INTRAVENOUS at 13:45

## 2021-01-01 RX ADMIN — ACETAMINOPHEN 650 MG: 325 TABLET, FILM COATED ORAL at 08:02

## 2021-01-01 RX ADMIN — POTASSIUM CHLORIDE 20 MEQ: 1500 TABLET, EXTENDED RELEASE ORAL at 05:28

## 2021-01-01 RX ADMIN — MINERAL OIL, PETROLATUM 1 APPLICATION: 425; 568 OINTMENT OPHTHALMIC at 18:00

## 2021-01-01 RX ADMIN — SODIUM CHLORIDE 1000 ML: 9 INJECTION, SOLUTION INTRAVENOUS at 18:30

## 2021-01-01 RX ADMIN — HYDROMORPHONE HYDROCHLORIDE 0.25 MG: 1 INJECTION, SOLUTION INTRAMUSCULAR; INTRAVENOUS; SUBCUTANEOUS at 06:47

## 2021-01-01 RX ADMIN — MINERAL OIL, PETROLATUM 1 APPLICATION: 425; 568 OINTMENT OPHTHALMIC at 10:00

## 2021-01-01 RX ADMIN — MINERAL OIL, PETROLATUM 1 APPLICATION: 425; 568 OINTMENT OPHTHALMIC at 02:00

## 2021-01-01 RX ADMIN — MORPHINE SULFATE 10 MG: 100 SOLUTION ORAL at 13:54

## 2021-01-01 RX ADMIN — HYDROMORPHONE HYDROCHLORIDE 2 MG: 2 TABLET ORAL at 07:24

## 2021-01-01 RX ADMIN — HYDROMORPHONE HYDROCHLORIDE 2 MG: 2 TABLET ORAL at 22:14

## 2021-01-01 RX ADMIN — HYDROMORPHONE HYDROCHLORIDE 0.25 MG: 1 INJECTION, SOLUTION INTRAMUSCULAR; INTRAVENOUS; SUBCUTANEOUS at 02:02

## 2021-01-01 RX ADMIN — ACETAMINOPHEN 325 MG: 325 TABLET, FILM COATED ORAL at 00:34

## 2021-01-01 RX ADMIN — HYDROMORPHONE HYDROCHLORIDE 0.25 MG: 1 INJECTION, SOLUTION INTRAMUSCULAR; INTRAVENOUS; SUBCUTANEOUS at 11:15

## 2021-01-01 RX ADMIN — SODIUM CHLORIDE: 9 INJECTION, SOLUTION INTRAVENOUS at 23:03

## 2021-01-01 RX ADMIN — CEFTRIAXONE SODIUM 2 G: 10 INJECTION, POWDER, FOR SOLUTION INTRAVENOUS at 14:50

## 2021-01-01 RX ADMIN — SODIUM CHLORIDE: 9 INJECTION, SOLUTION INTRAVENOUS at 06:41

## 2021-01-01 RX ADMIN — SODIUM CHLORIDE: 9 INJECTION, SOLUTION INTRAVENOUS at 20:32

## 2021-01-01 RX ADMIN — ACETAMINOPHEN 650 MG: 325 TABLET, FILM COATED ORAL at 13:06

## 2021-01-01 ASSESSMENT — ENCOUNTER SYMPTOMS
HEARTBURN: 0
NERVOUS/ANXIOUS: 1
TINGLING: 0
HEADACHES: 0
CONSTIPATION: 0
DIARRHEA: 0
CHILLS: 0
VOMITING: 0
FOCAL WEAKNESS: 0
BLOOD IN STOOL: 0
SHORTNESS OF BREATH: 1
TINGLING: 0
SORE THROAT: 0
SPEECH CHANGE: 0
PHOTOPHOBIA: 1
DIARRHEA: 0
ABDOMINAL PAIN: 1
INSOMNIA: 0
BLOOD IN STOOL: 0
VOMITING: 0
FEVER: 0
COUGH: 0
ABDOMINAL PAIN: 0
SORE THROAT: 0
BLOOD IN STOOL: 0
FEVER: 0
TINGLING: 0
BLOOD IN STOOL: 0
SHORTNESS OF BREATH: 0
PHOTOPHOBIA: 0
VOMITING: 0
HEARTBURN: 0
HEADACHES: 0
SPEECH CHANGE: 0
HEADACHES: 0
FEVER: 0
DIARRHEA: 0
SORE THROAT: 0
ABDOMINAL PAIN: 0
WHEEZING: 0
DIARRHEA: 0
VOMITING: 0
VOMITING: 0
BACK PAIN: 0
COUGH: 0
FLANK PAIN: 0
SPEECH CHANGE: 0
CHILLS: 0
CHILLS: 0
DEPRESSION: 0
SPUTUM PRODUCTION: 0
BACK PAIN: 0
DIARRHEA: 0
MYALGIAS: 1
BLOOD IN STOOL: 0
WHEEZING: 0
NERVOUS/ANXIOUS: 0
STRIDOR: 0
COUGH: 1
NAUSEA: 0
COUGH: 0
SHORTNESS OF BREATH: 1
NERVOUS/ANXIOUS: 1
HEARTBURN: 0
BACK PAIN: 0
FEVER: 0
BACK PAIN: 0
COUGH: 0
SPEECH CHANGE: 0
PHOTOPHOBIA: 0
NAUSEA: 0
SHORTNESS OF BREATH: 0
DIARRHEA: 0
SPUTUM PRODUCTION: 0
ABDOMINAL PAIN: 1
SORE THROAT: 0
CLAUDICATION: 0
NERVOUS/ANXIOUS: 1
COUGH: 0
NERVOUS/ANXIOUS: 1
DIARRHEA: 0
CHANGE IN LEVEL OF CONSCIOUSNESS: 1
DIZZINESS: 0
FEVER: 0
NERVOUS/ANXIOUS: 0
DIZZINESS: 0
HEADACHES: 0
ABDOMINAL PAIN: 0
SENSORY CHANGE: 0
PALPITATIONS: 0
FEVER: 0
INSOMNIA: 0
HEARTBURN: 0
COUGH: 0
VOMITING: 0
SPUTUM PRODUCTION: 0
VOMITING: 0
WHEEZING: 0
VOMITING: 0
MYALGIAS: 1
CHILLS: 0
TINGLING: 0
HEADACHES: 0
ABDOMINAL PAIN: 0
WEAKNESS: 0
FEVER: 0
WEAKNESS: 0
SPUTUM PRODUCTION: 0
BLURRED VISION: 0
TINGLING: 0
HEARTBURN: 0
SPEECH CHANGE: 0
CONSTIPATION: 0
NAUSEA: 1
CONSTIPATION: 1
COUGH: 1
NERVOUS/ANXIOUS: 0
NAUSEA: 0
CONSTIPATION: 0
BLOOD IN STOOL: 0
HEADACHES: 0
FLANK PAIN: 0
SPUTUM PRODUCTION: 0
CLAUDICATION: 0
BACK PAIN: 0
MYALGIAS: 0
FEVER: 0
FEVER: 0
NERVOUS/ANXIOUS: 1
DIZZINESS: 0
SHORTNESS OF BREATH: 0
WHEEZING: 0
CONSTIPATION: 0
CHILLS: 0
HEADACHES: 0
CONSTIPATION: 1
BLOOD IN STOOL: 0
WHEEZING: 0
SPUTUM PRODUCTION: 0
SORE THROAT: 0
HEARTBURN: 0
NAUSEA: 0
CONSTIPATION: 0
TINGLING: 0
EYE DISCHARGE: 0
BLURRED VISION: 0
WHEEZING: 0
FEVER: 0
ABDOMINAL PAIN: 0
DEPRESSION: 0
NERVOUS/ANXIOUS: 1
VOMITING: 0
HEARTBURN: 0
SHORTNESS OF BREATH: 1
SORE THROAT: 0
BACK PAIN: 0
ABDOMINAL PAIN: 0
HEARTBURN: 0
CHILLS: 0
SHORTNESS OF BREATH: 1
SENSORY CHANGE: 0
ABDOMINAL PAIN: 0
DIARRHEA: 0
CHILLS: 0
BRUISES/BLEEDS EASILY: 0
HEADACHES: 0
VOMITING: 0
CONSTIPATION: 0
ABDOMINAL PAIN: 1
NAUSEA: 0
NAUSEA: 0
CHILLS: 0
CONSTIPATION: 0
SPEECH CHANGE: 0
COUGH: 1
SHORTNESS OF BREATH: 1
CHILLS: 0
SORE THROAT: 0
SHORTNESS OF BREATH: 1
EYE REDNESS: 0
SORE THROAT: 0
BACK PAIN: 0
SHORTNESS OF BREATH: 0

## 2021-01-01 ASSESSMENT — PAIN DESCRIPTION - PAIN TYPE
TYPE: ACUTE PAIN

## 2021-01-01 ASSESSMENT — COGNITIVE AND FUNCTIONAL STATUS - GENERAL
WALKING IN HOSPITAL ROOM: A LITTLE
DRESSING REGULAR LOWER BODY CLOTHING: A LITTLE
HELP NEEDED FOR BATHING: A LITTLE
WALKING IN HOSPITAL ROOM: A LITTLE
PERSONAL GROOMING: A LITTLE
SUGGESTED CMS G CODE MODIFIER MOBILITY: CK
MOVING FROM LYING ON BACK TO SITTING ON SIDE OF FLAT BED: A LITTLE
MOVING FROM LYING ON BACK TO SITTING ON SIDE OF FLAT BED: A LITTLE
EATING MEALS: A LITTLE
MOVING TO AND FROM BED TO CHAIR: A LOT
SUGGESTED CMS G CODE MODIFIER DAILY ACTIVITY: CJ
DAILY ACTIVITIY SCORE: 22
TURNING FROM BACK TO SIDE WHILE IN FLAT BAD: A LOT
DRESSING REGULAR UPPER BODY CLOTHING: A LITTLE
STANDING UP FROM CHAIR USING ARMS: A LITTLE
CLIMB 3 TO 5 STEPS WITH RAILING: A LITTLE
MOVING TO AND FROM BED TO CHAIR: A LITTLE
TOILETING: A LITTLE
SUGGESTED CMS G CODE MODIFIER DAILY ACTIVITY: CK
TURNING FROM BACK TO SIDE WHILE IN FLAT BAD: A LITTLE
DAILY ACTIVITIY SCORE: 18
DRESSING REGULAR LOWER BODY CLOTHING: A LITTLE
MOBILITY SCORE: 18
SUGGESTED CMS G CODE MODIFIER MOBILITY: CK
HELP NEEDED FOR BATHING: A LITTLE
STANDING UP FROM CHAIR USING ARMS: A LITTLE
CLIMB 3 TO 5 STEPS WITH RAILING: A LITTLE
MOBILITY SCORE: 16

## 2021-01-01 ASSESSMENT — PATIENT HEALTH QUESTIONNAIRE - PHQ9
8. MOVING OR SPEAKING SO SLOWLY THAT OTHER PEOPLE COULD HAVE NOTICED. OR THE OPPOSITE, BEING SO FIGETY OR RESTLESS THAT YOU HAVE BEEN MOVING AROUND A LOT MORE THAN USUAL: NOT AT ALL
4. FEELING TIRED OR HAVING LITTLE ENERGY: SEVERAL DAYS
5. POOR APPETITE OR OVEREATING: SEVERAL DAYS
2. FEELING DOWN, DEPRESSED, IRRITABLE, OR HOPELESS: NOT AT ALL
1. LITTLE INTEREST OR PLEASURE IN DOING THINGS: SEVERAL DAYS
9. THOUGHTS THAT YOU WOULD BE BETTER OFF DEAD, OR OF HURTING YOURSELF: NOT AT ALL
3. TROUBLE FALLING OR STAYING ASLEEP OR SLEEPING TOO MUCH: SEVERAL DAYS
7. TROUBLE CONCENTRATING ON THINGS, SUCH AS READING THE NEWSPAPER OR WATCHING TELEVISION: NOT AT ALL
6. FEELING BAD ABOUT YOURSELF - OR THAT YOU ARE A FAILURE OR HAVE LET YOURSELF OR YOUR FAMILY DOWN: SEVERAL DAYS
SUM OF ALL RESPONSES TO PHQ QUESTIONS 1-9: 5
SUM OF ALL RESPONSES TO PHQ9 QUESTIONS 1 AND 2: 1

## 2021-01-01 ASSESSMENT — FIBROSIS 4 INDEX
FIB4 SCORE: 0.86
FIB4 SCORE: 0.66
FIB4 SCORE: 0.69
FIB4 SCORE: 1.22
FIB4 SCORE: 0.9

## 2021-01-01 ASSESSMENT — GAIT ASSESSMENTS
ASSISTIVE DEVICE: FRONT WHEEL WALKER
DEVIATION: BRADYKINETIC
DISTANCE (FEET): 10
GAIT LEVEL OF ASSIST: SUPERVISED

## 2021-01-01 ASSESSMENT — LIFESTYLE VARIABLES
EVER FELT BAD OR GUILTY ABOUT YOUR DRINKING: NO
ON A TYPICAL DAY WHEN YOU DRINK ALCOHOL HOW MANY DRINKS DO YOU HAVE: 0
AVERAGE NUMBER OF DAYS PER WEEK YOU HAVE A DRINK CONTAINING ALCOHOL: 0
HOW MANY TIMES IN THE PAST YEAR HAVE YOU HAD 5 OR MORE DRINKS IN A DAY: 0
HAVE PEOPLE ANNOYED YOU BY CRITICIZING YOUR DRINKING: NO
HAVE YOU EVER FELT YOU SHOULD CUT DOWN ON YOUR DRINKING: NO
EVER HAD A DRINK FIRST THING IN THE MORNING TO STEADY YOUR NERVES TO GET RID OF A HANGOVER: NO
ALCOHOL_USE: NO
TOTAL SCORE: 0
TOTAL SCORE: 0
CONSUMPTION TOTAL: NEGATIVE
TOTAL SCORE: 0

## 2021-01-01 ASSESSMENT — ACTIVITIES OF DAILY LIVING (ADL)
TOILETING: INDEPENDENT
CONTINENCE_REQUIRES_ASSISTANCE: 1
BATHING_REQUIRES_ASSISTANCE: 1
DRESSING_REQUIRES_ASSISTANCE: 1
PHYSICAL_TRANSFER_REQUIRES_ASSISTANCE: 1
AMBULATION_REQUIRES_ASSISTANCE: 1

## 2021-05-27 NOTE — PROGRESS NOTES
Subjective:     Madelyn Montes is a 71 y.o. female who presents for UTI (x 3 weeks, yellow discharged) and Abdominal Pain      Presents for bleeding, possibly vaginally vs urine. Started 3 weeks ago as a possible yeast infection, symptoms similar to previous infections. Had resolved. Possible vaginal spotting a couple day intermittently. Saturday night, felt tired, had significant amount of blood loss. Passed 2 small round firm masses, felt it was in urine. No hx of urine incontinence. LMP 10 years ago. Chronic constipation. Last BM more than regular BM. No back pain. Lower mid abdominal pain. No hx of GYN issues. No family hx of uterine or ovarian cancer. Mother had breast cancer. Does not have a current GYN.        Past Medical History:   Diagnosis Date   • History of [place] fever        Past Surgical History:   Procedure Laterality Date   • CATARACT EXTRACTION WITH IOL Bilateral    • OTHER         Social History     Socioeconomic History   • Marital status: Single     Spouse name: Not on file   • Number of children: Not on file   • Years of education: Not on file   • Highest education level: Not on file   Occupational History   • Not on file   Tobacco Use   • Smoking status: Never Smoker   • Smokeless tobacco: Never Used   Vaping Use   • Vaping Use: Never used   Substance and Sexual Activity   • Alcohol use: Yes     Comment: rarely    • Drug use: No   • Sexual activity: Not on file   Other Topics Concern   • Not on file   Social History Narrative   • Not on file     Social Determinants of Health     Financial Resource Strain:    • Difficulty of Paying Living Expenses:    Food Insecurity:    • Worried About Running Out of Food in the Last Year:    • Ran Out of Food in the Last Year:    Transportation Needs:    • Lack of Transportation (Medical):    • Lack of Transportation (Non-Medical):    Physical Activity:    • Days of Exercise per Week:    • Minutes of Exercise per Session:    Stress:    • Feeling  "of Stress :    Social Connections:    • Frequency of Communication with Friends and Family:    • Frequency of Social Gatherings with Friends and Family:    • Attends Rastafari Services:    • Active Member of Clubs or Organizations:    • Attends Club or Organization Meetings:    • Marital Status:    Intimate Partner Violence:    • Fear of Current or Ex-Partner:    • Emotionally Abused:    • Physically Abused:    • Sexually Abused:         Family History   Problem Relation Age of Onset   • Cancer Mother         Allergies   Allergen Reactions   • Pcn [Penicillins] Rash     HAS TAKEN AMOXICILLIN BEFORE   • Soy      Swelling along face left   • Sulfa Drugs Nausea and Anxiety       Review of Systems   Constitutional: Negative for fever.   Respiratory: Negative for shortness of breath.    Cardiovascular: Negative for palpitations.   Gastrointestinal: Positive for abdominal pain and constipation. Negative for blood in stool and vomiting.   Genitourinary: Positive for hematuria. Negative for dysuria and flank pain.   Musculoskeletal: Negative for back pain.   Skin: Negative for itching and rash.   Neurological: Negative for dizziness.   All other systems reviewed and are negative.       Objective:   /98   Pulse 98   Temp 36.9 °C (98.5 °F) (Temporal)   Resp 18   Ht 1.702 m (5' 7\")   Wt 90.7 kg (200 lb)   SpO2 95%   BMI 31.32 kg/m²     Physical Exam  Vitals reviewed.   Constitutional:       General: She is not in acute distress.     Appearance: She is well-developed.   HENT:      Head: Normocephalic and atraumatic.      Right Ear: External ear normal.      Left Ear: External ear normal.      Nose: Nose normal.   Eyes:      Conjunctiva/sclera: Conjunctivae normal.   Cardiovascular:      Rate and Rhythm: Normal rate.   Pulmonary:      Effort: Pulmonary effort is normal.   Abdominal:      General: Bowel sounds are normal. There is no distension.      Palpations: Abdomen is soft.      Tenderness: There is abdominal " tenderness in the suprapubic area. There is no right CVA tenderness, left CVA tenderness or guarding.   Genitourinary:     Labia:         Right: No rash or lesion.         Left: No rash or lesion.       Vagina: Tenderness and bleeding present.      Comments: Unable to fully inset speculum or visualize cervix. Reported discomfort with use of speculum during exam, otherwise tolerated exam well.   Musculoskeletal:         General: Normal range of motion.      Cervical back: Normal range of motion.   Skin:     General: Skin is warm and dry.      Coloration: Skin is not pale.      Findings: No rash.   Neurological:      General: No focal deficit present.      Mental Status: She is alert and oriented to person, place, and time.      GCS: GCS eye subscore is 4. GCS verbal subscore is 5. GCS motor subscore is 6.   Psychiatric:         Mood and Affect: Mood normal.         Speech: Speech normal.         Behavior: Behavior normal.         Thought Content: Thought content normal.         Judgment: Judgment normal.         Assessment/Plan:    1. Vaginal bleeding  - VAGINAL PATHOGENS DNA PANEL; Future  - US-PELVIC COMPLETE (TRANSABDOMINAL/TRANSVAGINAL) (COMBO); Future  - REFERRAL TO GYNECOLOGY  -Pelvic exam    2. Lower abdominal pain  - POCT Urinalysis  - VAGINAL PATHOGENS DNA PANEL; Future  - URINE CULTURE(NEW); Future  - US-PELVIC COMPLETE (TRANSABDOMINAL/TRANSVAGINAL) (COMBO); Future  -OTC tylenol for pain.    3. Leukocytes in urine  - POCT Urinalysis  - URINE CULTURE(NEW); Future  Results for orders placed or performed during the hospital encounter of 05/27/21   URINE CULTURE(NEW)    Specimen: Urine   Result Value Ref Range    Significant Indicator NEG     Source UR     Site -     Culture Result       Growth noted after further incubation, Usual skin estela  <10,000 cfu/mL     VAGINAL PATHOGENS DNA PANEL   Result Value Ref Range    Candida species DNA Probe Negative Negative    Trichamonas vaginalis DNA Probe Negative  Negative    Gardnerella vaginalis DNA Probe Negative Negative     Follow up with PCP. Follow up urgently for new or persistent abdominal pain, flank pain, difficulty with urination, fevers, vomiting, weakness, tachycardia, dizziness, palpitation, SOB, or any other concerns    Stable vitals. Patient unsure of source of recent bleeding. Discussed performing pelvic exam, positive for vaginal bleeding. Recommend U/S for post menopausal bleeding. Patient unable to go to scheduled US today, given number to radiology to self schedule US.    Differential diagnosis, natural history, supportive care, and indications for immediate follow-up discussed.

## 2021-05-31 NOTE — TELEPHONE ENCOUNTER
Notified pt. of negative urine culture and vaginal pathogens, pt stated that she is feeling much better, but not completely at 100% yet. Pt had no further questions or concerns at this time.

## 2021-06-01 NOTE — PATIENT INSTRUCTIONS
Postmenopausal Bleeding    Postmenopausal bleeding is any bleeding that a woman has after she has entered into menopause. Menopause is the end of a woman’s fertile years. After menopause, a woman no longer ovulates and does not have menstrual periods.  Postmenopausal bleeding may have various causes, including:  · Menopausal hormone therapy (MHT).  · Endometrial atrophy. After menopause, low estrogen hormone levels cause the membrane that lines the uterus (endometrium) to become thinner. You may have bleeding as the endometrium thins.  · Endometrial hyperplasia. This condition is caused by excess estrogen hormones and low levels of progesterone hormones. The excess estrogen causes the endometrium to thicken, which can lead to bleeding. In some cases, this can lead to cancer of the uterus.  · Endometrial cancer.  · Non-cancerous growths (polyps) on the endometrium, the lining of the uterus, or the cervix.  · Uterine fibroids. These are non-cancerous growths in or around the uterus muscle tissue that can cause heavy bleeding.  Any type of postmenopausal bleeding, even if it appears to be a typical menstrual period, should be evaluated by your health care provider. Treatment will depend on the cause of the bleeding.  Follow these instructions at home:  · Pay attention to any changes in your symptoms.  · Avoid using tampons and douches as told by your health care provider.  · Change your pads regularly.  · Get regular pelvic exams and Pap tests.  · Take iron supplements as told by your health care provider.  · Take over-the-counter and prescription medicines only as told by your health care provider.  · Keep all follow-up visits as told by your health care provider. This is important.  Contact a health care provider if:  · Your bleeding lasts more than 1 week.  · You have abdominal pain.  · You have bleeding with or after sexual intercourse.  · You have bleeding that happens more often than every 3 weeks.  Get help  right away if:  · You have a fever, chills, headache, dizziness, muscle aches, and bleeding.  · You have severe pain with bleeding.  · You are passing blood clots.  · You have heavy bleeding, need more than 1 pad an hour, and have never experienced this before.  · You feel faint.  Summary  · Postmenopausal bleeding is any bleeding that a woman has after she has entered into menopause.  · Postmenopausal bleeding may have various causes. Treatment will depend on the cause of the bleeding.  · Any type of postmenopausal bleeding, even if it appears to be a typical menstrual period, should be evaluated by your health care provider.  · Be sure to pay attention to any changes in your symptoms and keep all follow-up visits as told by your health care provider.  This information is not intended to replace advice given to you by your health care provider. Make sure you discuss any questions you have with your health care provider.  Document Released: 03/28/2007 Document Revised: 03/27/2019 Document Reviewed: 03/13/2018  [employer] Patient Education © 2020 [employer]

## 2021-06-26 NOTE — ED PROVIDER NOTES
ED Provider Note    CHIEF COMPLAINT  Chief Complaint   Patient presents with   • Vaginal Bleeding     spotting a month ago Had one night of increased bleeding and now abdominal pain and diarrhea       HPI  Madelyn Montes is a 71 y.o. female who presents to the emergency department with vaginal bleeding.  The patient developed vaginal bleeding about a month ago.  She is had some spotting.  Patient is postmenopausal by 10 years.  Bleeding started last month.  She was seen in urgent care.  Pelvic exam was performed.  She was tested for pelvic infections.  This was negative.  She was scheduled for follow-up with gynecology which she has an appointment for in about 2 weeks.  She is been having trouble getting her ultrasound performed.  She is unable have this done until the end of July.  Patient has ongoing spotting and some lower abdominal discomfort and therefore comes the emergency department for evaluation.    REVIEW OF SYSTEMS  See HPI for further details. All other systems are negative.     PAST MEDICAL HISTORY  Past Medical History:   Diagnosis Date   • History of [place] fever        FAMILY HISTORY  Family History   Problem Relation Age of Onset   • Cancer Mother        SOCIAL HISTORY  Social History     Socioeconomic History   • Marital status: Single     Spouse name: Not on file   • Number of children: Not on file   • Years of education: Not on file   • Highest education level: Not on file   Occupational History   • Not on file   Tobacco Use   • Smoking status: Never Smoker   • Smokeless tobacco: Never Used   Vaping Use   • Vaping Use: Never used   Substance and Sexual Activity   • Alcohol use: Yes     Comment: rarely    • Drug use: No   • Sexual activity: Not on file   Other Topics Concern   • Not on file   Social History Narrative   • Not on file     Social Determinants of Health     Financial Resource Strain:    • Difficulty of Paying Living Expenses:    Food Insecurity:    • Worried About Running  "Out of Food in the Last Year:    • Ran Out of Food in the Last Year:    Transportation Needs:    • Lack of Transportation (Medical):    • Lack of Transportation (Non-Medical):    Physical Activity:    • Days of Exercise per Week:    • Minutes of Exercise per Session:    Stress:    • Feeling of Stress :    Social Connections:    • Frequency of Communication with Friends and Family:    • Frequency of Social Gatherings with Friends and Family:    • Attends Yarsanism Services:    • Active Member of Clubs or Organizations:    • Attends Club or Organization Meetings:    • Marital Status:    Intimate Partner Violence:    • Fear of Current or Ex-Partner:    • Emotionally Abused:    • Physically Abused:    • Sexually Abused:        SURGICAL HISTORY  Past Surgical History:   Procedure Laterality Date   • CATARACT EXTRACTION WITH IOL Bilateral    • OTHER         CURRENT MEDICATIONS  Home Medications    **Home medications have not yet been reviewed for this encounter**         ALLERGIES  Allergies   Allergen Reactions   • Pcn [Penicillins] Rash     HAS TAKEN AMOXICILLIN BEFORE   • Soy      Swelling along face left   • Sulfa Drugs Nausea and Anxiety       PHYSICAL EXAM  VITAL SIGNS: /78   Pulse (!) 105   Temp 36.4 °C (97.5 °F) (Temporal)   Resp 18   Ht 1.702 m (5' 7\")   Wt 91.7 kg (202 lb 2.6 oz)   SpO2 94%   BMI 31.66 kg/m²   Constitutional: Well developed, Well nourished, mild distress, Non-toxic appearance.   HENT: Normocephalic, Atraumatic, Bilateral external ears normal, Oropharynx moist, No oral exudates, Nose normal.   Eyes: PERRL, EOMI, Conjunctiva normal, No discharge.   Neck: Normal range of motion, No tenderness, Supple, No stridor.   Lymphatic: No lymphadenopathy noted.   Cardiovascular: Normal heart rate, Normal rhythm, No murmurs, No rubs, No gallops.   Thorax & Lungs: Normal breath sounds, No respiratory distress, No wheezing, No chest tenderness.   Abdomen: Obese, soft, mild lower abdominal " tenderness to palpation without any rebound tenderness or guarding.  No peritoneal signs.  Skin: Warm, Dry, No erythema, No rash.   Back: No tenderness, No CVA tenderness.   Extremities: Intact distal pulses, No edema, No tenderness, No cyanosis, No clubbing.   Neurologic: Alert & oriented x 3, Normal motor function, Normal sensory function, No focal deficits noted.       RADIOLOGY/PROCEDURES  US-PELVIC COMPLETE (TRANSABDOMINAL/TRANSVAGINAL) (COMBO)   Final Result      1.  There is an irregular hypoechoic masslike area in the lower uterine segment centrally obscuring the endometrial complex. This could represent underlying mass versus blood products.   2.  There is a solid appearing mass with some calcifications in the left adnexa.   3.  There is a right ovarian cyst.        Results for orders placed or performed during the hospital encounter of 06/26/21   CBC WITH DIFFERENTIAL   Result Value Ref Range    WBC 10.2 4.8 - 10.8 K/uL    RBC 5.79 (H) 4.20 - 5.40 M/uL    Hemoglobin 17.0 (H) 12.0 - 16.0 g/dL    Hematocrit 49.9 (H) 37.0 - 47.0 %    MCV 86.2 81.4 - 97.8 fL    MCH 29.4 27.0 - 33.0 pg    MCHC 34.1 33.6 - 35.0 g/dL    RDW 38.7 35.9 - 50.0 fL    Platelet Count 343 164 - 446 K/uL    MPV 9.3 9.0 - 12.9 fL    Neutrophils-Polys 78.20 (H) 44.00 - 72.00 %    Lymphocytes 11.40 (L) 22.00 - 41.00 %    Monocytes 8.60 0.00 - 13.40 %    Eosinophils 0.90 0.00 - 6.90 %    Basophils 0.50 0.00 - 1.80 %    Immature Granulocytes 0.40 0.00 - 0.90 %    Nucleated RBC 0.00 /100 WBC    Neutrophils (Absolute) 8.00 (H) 2.00 - 7.15 K/uL    Lymphs (Absolute) 1.17 1.00 - 4.80 K/uL    Monos (Absolute) 0.88 (H) 0.00 - 0.85 K/uL    Eos (Absolute) 0.09 0.00 - 0.51 K/uL    Baso (Absolute) 0.05 0.00 - 0.12 K/uL    Immature Granulocytes (abs) 0.04 0.00 - 0.11 K/uL    NRBC (Absolute) 0.00 K/uL   COMP METABOLIC PANEL   Result Value Ref Range    Sodium 133 (L) 135 - 145 mmol/L    Potassium 4.0 3.6 - 5.5 mmol/L    Chloride 99 96 - 112 mmol/L    Co2  21 20 - 33 mmol/L    Anion Gap 13.0 7.0 - 16.0    Glucose 112 (H) 65 - 99 mg/dL    Bun 11 8 - 22 mg/dL    Creatinine 0.94 0.50 - 1.40 mg/dL    Calcium 9.4 8.4 - 10.2 mg/dL    AST(SGOT) 19 12 - 45 U/L    ALT(SGPT) 18 2 - 50 U/L    Alkaline Phosphatase 89 30 - 99 U/L    Total Bilirubin 1.1 0.1 - 1.5 mg/dL    Albumin 3.8 3.2 - 4.9 g/dL    Total Protein 6.8 6.0 - 8.2 g/dL    Globulin 3.0 1.9 - 3.5 g/dL    A-G Ratio 1.3 g/dL   LIPASE   Result Value Ref Range    Lipase 23 7 - 58 U/L   URINALYSIS    Specimen: Urine   Result Value Ref Range    Color Dark Yellow     Character Clear     Specific Gravity 1.010 <1.035    Ph 5.5 5.0 - 8.0    Glucose Negative Negative mg/dL    Ketones 15 (A) Negative mg/dL    Protein Negative Negative mg/dL    Bilirubin Small (A) Negative    Nitrite Negative Negative    Leukocyte Esterase Trace (A) Negative    Occult Blood Large (A) Negative    Micro Urine Req Microscopic    ESTIMATED GFR   Result Value Ref Range    GFR If African American >60 >60 mL/min/1.73 m 2    GFR If Non African American 59 (A) >60 mL/min/1.73 m 2   URINE MICROSCOPIC (W/UA)   Result Value Ref Range    WBC 0-2 /hpf    RBC 10-20 (A) /hpf    Bacteria Few (A) None /hpf    Epithelial Cells Few Few /hpf    Mucous Threads Moderate /hpf    Urine Crystals Rare Amorphous /hpf    Hyaline Cast 0-2 /lpf         COURSE & MEDICAL DECISION MAKING  Pertinent Labs & Imaging studies reviewed. (See chart for details)    The patient presents today with postmenopausal bleeding.  There is certainly concern for malignancy.  IV is established.  Laboratory studies obtained.  CBC is obtained.  This demonstrates no evidence of significant anemia.  Ultrasound findings as above.  There is a pelvic mass as well as an abnormality in lower uterine segment.  This is discussed with the patient.  She verbalized understanding.  She already has an appointment scheduled with gynecology.  I encouraged her to follow-up with this physician as already scheduled.   Gynecology services are not on-call at this facility.  Return precautions are discussed.  Patient verbalized understanding.  Offered a CT scan but the patient declined.  She says that she does not want to go through any additional testing today.  Discharged in stable condition.    The patient will return for new or worsening symptoms and is stable at the time of discharge.    The patient is referred to a primary physician for blood pressure management, diabetic screening, and for all other preventative health concerns.    DISPOSITION:  Patient will be discharged home in stable condition.    FOLLOW UP:  [facility], Emergency Dept  [address]  [place] [id number]  [phone]    If symptoms worsen    ob/gyn as already scheduled            OUTPATIENT MEDICATIONS:  Discharge Medication List as of 6/26/2021  1:36 PM        FINAL IMPRESSION  1. Post-menopausal bleeding    2. Abnormal ultrasound    3. Pelvic mass            Electronically signed by: Alton Luz M.D., 6/26/2021 10:34 AM

## 2021-06-26 NOTE — ED NOTES
ERP at bedside. Pt agrees with plan of care discussed by ERP. AIDET acknowledged with patient. Ramesh in low position, side rail up for pt safety. Call light within reach. Will continue to monitor.

## 2021-07-07 NOTE — ED TRIAGE NOTES
"Chief Complaint   Patient presents with   • Abdominal Pain     lower abdomen, c/o pain, night sweats, difficulty urinating. Was here a week ago saturday for similair concern, supposed to be at the uro-gynecologist this AM but was told to come here due to acute pain     /77   Pulse (!) 110   Temp 36.4 °C (97.6 °F) (Temporal)   Resp (!) 22   Ht 1.702 m (5' 7\")   Wt 90 kg (198 lb 6.6 oz)   SpO2 93%     Pt arrived w/ above concern, pt very tearful.  "

## 2021-07-07 NOTE — ED PROVIDER NOTES
"ED Provider Note    CHIEF COMPLAINT  Chief Complaint   Patient presents with   • Abdominal Pain     lower abdomen, c/o pain, night sweats, difficulty urinating. Was here a week ago saturday for similair concern, supposed to be at the uro-gynecologist this AM but was told to come here due to acute pain       HPI  Madelyn Montes is a 71 y.o. female here for evaluation of abdominal pain.  Patient states that she was seen here on 26 June for the same thing.  It was noted that she had a pelvic mass, and was referred to urogynecology as well.  Patient was supposed to have her appointment this morning, but because of her increasing pain she came here instead.  Patient states that this is her typical pain that has been going on for 7 weeks, and she is not taken anything prior to arrival for the discomfort.  She has no vomiting, fever chills, chest pain or shortness of breath.  Patient has no back pain.  She states the pain stays in the lower abdomen.  She is here because she \"needs to get this taken care of.\"  Nothing alleviates or exacerbates her symptoms.  She describes a sharp intermittent pain.      ROS  See HPI for further details, o/w negative.     PAST MEDICAL HISTORY   has a past medical history of History of [place] fever.    SOCIAL HISTORY  Social History     Tobacco Use   • Smoking status: Never Smoker   • Smokeless tobacco: Never Used   Vaping Use   • Vaping Use: Never used   Substance and Sexual Activity   • Alcohol use: Yes     Comment: rarely    • Drug use: No   • Sexual activity: Not on file       Family History  No bleeding disorders noted    SURGICAL HISTORY   has a past surgical history that includes other and cataract extraction with iol (Bilateral).    CURRENT MEDICATIONS  Home Medications     Reviewed by Jeromy Levin (Pharmacy Tech) on 07/07/21 at 1028  Med List Status: Complete   Medication Last Dose Status   aspirin (ASA) 325 MG Tab > 1 month Active          "       ALLERGIES  Allergies   Allergen Reactions   • Soy Swelling     Swelling along face left   • Aloe Vera Swelling     Per pt reports that where it was applied she swelled up    • Pcn [Penicillins] Rash     HAS TAKEN AMOXICILLIN BEFORE   • Sulfa Drugs Nausea and Anxiety       REVIEW OF SYSTEMS  See HPI for further details. Review of systems as above, otherwise all other systems are negative.     PHYSICAL EXAM  Constitutional: Well developed, well nourished.  Mild acute distress.  HEENT: Normocephalic, atraumatic. Posterior pharynx clear and moist.  Eyes:  EOMI. Normal sclera.  Neck: Supple, Full range of motion, nontender.  Chest/Pulmonary: clear to ausculation. Symmetrical expansion.   Cardio: Regular rate and rhythm with no murmur.   Abdomen: Soft, mild diffuse tenderness, no peritoneal signs. No guarding. No palpable masses.  Musculoskeletal: No deformity, no edema, neurovascular intact.   Neuro: Clear speech, appropriate, cooperative, cranial nerves II-XII grossly intact.  Psych: Anxious mood and affect    PROCEDURES     MEDICAL RECORD  I have reviewed patient's medical record and pertinent results are listed.    COURSE & MEDICAL DECISION MAKING  I have reviewed any medical record information, laboratory studies and radiographic results as noted above.    Results for orders placed or performed during the hospital encounter of 07/07/21   CBC WITH DIFFERENTIAL   Result Value Ref Range    WBC 14.5 (H) 4.8 - 10.8 K/uL    RBC 5.23 4.20 - 5.40 M/uL    Hemoglobin 15.4 12.0 - 16.0 g/dL    Hematocrit 43.5 37.0 - 47.0 %    MCV 83.2 81.4 - 97.8 fL    MCH 29.4 27.0 - 33.0 pg    MCHC 35.4 (H) 33.6 - 35.0 g/dL    RDW 37.0 35.9 - 50.0 fL    Platelet Count 547 (H) 164 - 446 K/uL    MPV 9.2 9.0 - 12.9 fL    Neutrophils-Polys 84.40 (H) 44.00 - 72.00 %    Lymphocytes 3.00 (L) 22.00 - 41.00 %    Monocytes 11.30 0.00 - 13.40 %    Eosinophils 0.20 0.00 - 6.90 %    Basophils 0.30 0.00 - 1.80 %    Immature Granulocytes 0.80 0.00 -  0.90 %    Nucleated RBC 0.00 /100 WBC    Neutrophils (Absolute) 12.19 (H) 2.00 - 7.15 K/uL    Lymphs (Absolute) 0.44 (L) 1.00 - 4.80 K/uL    Monos (Absolute) 1.63 (H) 0.00 - 0.85 K/uL    Eos (Absolute) 0.03 0.00 - 0.51 K/uL    Baso (Absolute) 0.04 0.00 - 0.12 K/uL    Immature Granulocytes (abs) 0.12 (H) 0.00 - 0.11 K/uL    NRBC (Absolute) 0.00 K/uL   COMP METABOLIC PANEL   Result Value Ref Range    Sodium 124 (L) 135 - 145 mmol/L    Potassium 4.8 3.6 - 5.5 mmol/L    Chloride 87 (L) 96 - 112 mmol/L    Co2 18 (L) 20 - 33 mmol/L    Anion Gap 19.0 (H) 7.0 - 16.0    Glucose 114 (H) 65 - 99 mg/dL    Bun 35 (H) 8 - 22 mg/dL    Creatinine 1.35 0.50 - 1.40 mg/dL    Calcium 9.2 8.4 - 10.2 mg/dL    AST(SGOT) 19 12 - 45 U/L    ALT(SGPT) 12 2 - 50 U/L    Alkaline Phosphatase 129 (H) 30 - 99 U/L    Total Bilirubin 1.5 0.1 - 1.5 mg/dL    Albumin 2.9 (L) 3.2 - 4.9 g/dL    Total Protein 6.3 6.0 - 8.2 g/dL    Globulin 3.4 1.9 - 3.5 g/dL    A-G Ratio 0.9 g/dL   URINALYSIS CULTURE, IF INDICATED    Specimen: Urine   Result Value Ref Range    Color Dark Yellow     Character Cloudy (A)     Specific Gravity >=1.030 <1.035    Ph 5.0 5.0 - 8.0    Glucose Negative Negative mg/dL    Ketones 15 (A) Negative mg/dL    Protein 300 Negative mg/dL    Bilirubin Large (A) Negative    Nitrite Positive (A) Negative    Leukocyte Esterase Small (A) Negative    Occult Blood Large (A) Negative    Micro Urine Req Microscopic    ESTIMATED GFR   Result Value Ref Range    GFR If  47 (A) >60 mL/min/1.73 m 2    GFR If Non  39 (A) >60 mL/min/1.73 m 2   URINE MICROSCOPIC (W/UA)   Result Value Ref Range    WBC Packed (A) /hpf    RBC 10-20 (A) /hpf    Bacteria Few (A) None /hpf    Epithelial Cells Few Few /hpf    Mucous Threads Many /hpf    Amorphous Crystal Present /hpf    Hyaline Cast 0-2 /lpf   URINE CULTURE(NEW)    Specimen: Urine   Result Value Ref Range    Significant Indicator NEG     Source UR     Site -     Culture Result -       No orders to display         HYDRATION: Based on the patient's presentation of Dehydration the patient was given IV fluids. IV Hydration was used because oral hydration was not adequate alone. Upon recheck following hydration, the patient was improved.    11:58 AM  This time the patient is nontoxic-appearing, afebrile and comfortable.  She has a urinary tract infection, and will be given a dose of Rocephin here, and states that should be fine because she has taken amoxicillin before.  I will also send her home on a prescription for Augmentin, and she will follow-up.  She agrees to follow-up with your OB/GYN for her pelvic mass, and I did not reimage her because nothing is changed.  Patient is much more comfortable.      If you have had any blood pressure issues while here in the emergency department, please see your doctor for a further evaluation or work up.    Differential diagnoses include but not limited to: UTI, worse possible cancer.    This patient presents with acute UTI.  At this time, I have counseled the patient/family regarding their medications, pain control, and follow up.  They will continue their medications, if any, as prescribed.  They will return immediately for any worsening symptoms and/or any other medical concerns.  They will see their doctor, or contact the doctor provided, in 1-2 days for follow up.       FINAL IMPRESSION  1. Acute UTI  traMADol (ULTRAM) 50 MG Tab           Electronically signed by: Jose Manuel Falcon D.O., 7/7/2021 10:41 AM

## 2021-07-07 NOTE — ED NOTES
PIV removed, in an attempt to take a final set of vitals this pt pleased that the cuff be removed, it ws removed.

## 2021-07-07 NOTE — ED NOTES
"Pt has refused medications ordered at this time, pt states \"I believe narcotics will kill me\"; ERP aware.   "

## 2021-07-07 NOTE — ED NOTES
"Pt states \"very drug naive, one Advil makes me sleep for 12 hours\"; pt here to ask for options r/t pain control.   "

## 2021-07-07 NOTE — ED NOTES
Pt cleared for dischg  Instructions given to pt  Made aware that Rx augmentin, pyridium and ultram were sent to listed pharmacy  She is to go and  med's and take as prescribed  Instructed to f/u w/ [facility] as needed and or return for worsening symptoms  D/c'ed to home in NAD

## 2021-07-07 NOTE — ED NOTES
Pt very tearful and in emotional distress  Spoke to pt about medication that is about to be given  Pt did not want tramadol at this time  Took pyridium fairly well encouraged pt to drink water

## 2021-07-07 NOTE — ED NOTES
Med rec updated and complete  Allergies reviewed  Pt reports no prescription medications or vitamins   Pt reports no antibiotics in the last 30 days.    No current facility-administered medications on file prior to encounter.     Current Outpatient Medications on File Prior to Encounter   Medication Sig Dispense Refill   • aspirin (ASA) 325 MG Tab Take 325 mg by mouth every 6 hours as needed for Mild Pain.

## 2021-07-12 PROBLEM — N30.00 ACUTE CYSTITIS WITHOUT HEMATURIA: Status: ACTIVE | Noted: 2021-01-01

## 2021-07-12 PROBLEM — N17.9 AKI (ACUTE KIDNEY INJURY) (HCC): Status: ACTIVE | Noted: 2021-01-01

## 2021-07-12 PROBLEM — E87.1 HYPONATREMIA: Status: ACTIVE | Noted: 2021-01-01

## 2021-07-12 PROBLEM — A41.9 SEPSIS (HCC): Status: ACTIVE | Noted: 2021-01-01

## 2021-07-12 PROBLEM — E87.5 HYPERKALEMIA: Status: ACTIVE | Noted: 2021-01-01

## 2021-07-12 NOTE — ED PROVIDER NOTES
"ED Provider Note    CHIEF COMPLAINT  Chief Complaint   Patient presents with   • Abdominal Pain     Mid and low  Seen here at ER 5 d ago and Dx UTI Unable to swallow her oral ABX due to \" strong gag reflex \"       HPI  Madelyn Montes is a 71 y.o. female who returns to the emergency department 5 days after being diagnosed with urinary tract infection, states that she has been unable to take the antibiotics and continues to worsen.  She feels generally weak, she has pain mostly involving the lower abdomen but to a lesser grade the upper abdomen.  She feels that she has fever as well.  She has difficult time urinating.  No chest pain or shortness of breath.  No rash.  No new back pain.  She states that she has a history of difficulty swallowing with a strong gag reflex, no matter what she did at home she was unable to take any of the antibiotics.    REVIEW OF SYSTEMS  Negative for rash, chest pain, dyspnea, headache, focal weakness, focal numbness, focal tingling, back pain. All other systems are negative.     PAST MEDICAL HISTORY  Past Medical History:   Diagnosis Date   • History of [facility] fever    • Photophobia    • Retina disorder     retinal tear repair       FAMILY HISTORY  Family History   Problem Relation Age of Onset   • Cancer Mother        SOCIAL HISTORY  Social History     Tobacco Use   • Smoking status: Never Smoker   • Smokeless tobacco: Never Used   Vaping Use   • Vaping Use: Never used   Substance Use Topics   • Alcohol use: Yes     Comment: rarely    • Drug use: No       SURGICAL HISTORY  Past Surgical History:   Procedure Laterality Date   • CATARACT EXTRACTION WITH IOL Bilateral    • OTHER      retinal repain       CURRENT MEDICATIONS  I personally reviewed the medication list in the charting documentation.     ALLERGIES  Allergies   Allergen Reactions   • Soy Swelling     Swelling along face left   • Aloe Vera Swelling     Per pt reports that where it was applied she swelled up    • Pcn " "[Penicillins] Rash     HAS TAKEN AMOXICILLIN BEFORE   • Sulfa Drugs Nausea and Anxiety       MEDICAL RECORD  I have reviewed patient's medical record and pertinent results are listed above.      PHYSICAL EXAM  VITAL SIGNS: /61   Pulse (!) 114   Temp 36.4 °C (97.6 °F) (Temporal)   Resp 22   Ht 1.702 m (5' 7\")   Wt 89.8 kg (198 lb)   SpO2 90% Comment: \" lung damage from [place] \"  BMI 31.01 kg/m²    Constitutional: Acutely ill-appearing, pale, increased respiratory effort  HENT: Normocephalic, no obvious evidence of acute trauma.  Eyes: No scleral icterus. Normal conjunctiva   Neck: Comfortable movement without any obvious restriction in the range of motion.  Cardiovascular: Upon ascultation I appreciate a regular heart rhythm and a tachycardic rate.   Thorax & Lungs: Normal nonlabored respirations.  Upon application of the stethoscope for auscultation I find there to be no associated chest wall tenderness.  I appreciate no wheezing, rhonchi or rales. There is normal air movement.    Abdomen: The abdomen is not visibly distended. Upon palpation, there is mild generalized tenderness that has increased severity focally over the suprapubic region.  No rebound or guarding.  Skin: The exposed portions of skin reveal no obvious rash or other abnormalities.  Extremities/Musculoskeletal: No obvious sign of acute trauma. No asymmetric calf tenderness or edema.   Neurologic: Alert & oriented. No focal deficits observed.   Psychiatric: Normal affect appropriate for the clinical situation.    DIAGNOSTIC STUDIES / PROCEDURES    LABS/EKGs  Results for orders placed or performed during the hospital encounter of 07/12/21   CBC WITH DIFFERENTIAL   Result Value Ref Range    WBC 25.3 (H) 4.8 - 10.8 K/uL    RBC 5.50 (H) 4.20 - 5.40 M/uL    Hemoglobin 16.0 12.0 - 16.0 g/dL    Hematocrit 46.4 37.0 - 47.0 %    MCV 84.4 81.4 - 97.8 fL    MCH 29.1 27.0 - 33.0 pg    MCHC 34.5 33.6 - 35.0 g/dL    RDW 38.8 35.9 - 50.0 fL    " Platelet Count 649 (H) 164 - 446 K/uL    MPV 9.2 9.0 - 12.9 fL    Neutrophils-Polys 85.90 (H) 44.00 - 72.00 %    Lymphocytes 3.80 (L) 22.00 - 41.00 %    Monocytes 8.10 0.00 - 13.40 %    Eosinophils 0.00 0.00 - 6.90 %    Basophils 0.30 0.00 - 1.80 %    Immature Granulocytes 1.90 (H) 0.00 - 0.90 %    Nucleated RBC 0.00 /100 WBC    Neutrophils (Absolute) 21.77 (H) 2.00 - 7.15 K/uL    Lymphs (Absolute) 0.97 (L) 1.00 - 4.80 K/uL    Monos (Absolute) 2.04 (H) 0.00 - 0.85 K/uL    Eos (Absolute) 0.01 0.00 - 0.51 K/uL    Baso (Absolute) 0.08 0.00 - 0.12 K/uL    Immature Granulocytes (abs) 0.47 (H) 0.00 - 0.11 K/uL    NRBC (Absolute) 0.00 K/uL   COMP METABOLIC PANEL   Result Value Ref Range    Sodium 122 (L) 135 - 145 mmol/L    Potassium 5.7 (H) 3.6 - 5.5 mmol/L    Chloride 85 (L) 96 - 112 mmol/L    Co2 16 (L) 20 - 33 mmol/L    Anion Gap 21.0 (H) 7.0 - 16.0    Glucose 141 (H) 65 - 99 mg/dL    Bun 60 (H) 8 - 22 mg/dL    Creatinine 1.90 (H) 0.50 - 1.40 mg/dL    Calcium 9.6 8.4 - 10.2 mg/dL    AST(SGOT) 25 12 - 45 U/L    ALT(SGPT) 16 2 - 50 U/L    Alkaline Phosphatase 132 (H) 30 - 99 U/L    Total Bilirubin 1.0 0.1 - 1.5 mg/dL    Albumin 2.9 (L) 3.2 - 4.9 g/dL    Total Protein 6.8 6.0 - 8.2 g/dL    Globulin 3.9 (H) 1.9 - 3.5 g/dL    A-G Ratio 0.7 g/dL   LACTIC ACID   Result Value Ref Range    Lactic Acid 3.3 (H) 0.5 - 2.0 mmol/L   URINALYSIS    Specimen: Blood   Result Value Ref Range    Color Yellow     Character Hazy (A)     Specific Gravity 1.025 <1.035    Ph 5.0 5.0 - 8.0    Glucose Negative Negative mg/dL    Ketones Trace (A) Negative mg/dL    Protein 30 (A) Negative mg/dL    Bilirubin Small (A) Negative    Nitrite Negative Negative    Leukocyte Esterase Moderate (A) Negative    Occult Blood Large (A) Negative    Micro Urine Req Microscopic    LACTIC ACID   Result Value Ref Range    Lactic Acid 3.7 (H) 0.5 - 2.0 mmol/L   URINE MICROSCOPIC (W/UA)   Result Value Ref Range    WBC 20-50 (A) /hpf    RBC 5-10 (A) /hpf    Bacteria  Moderate (A) None /hpf    Epithelial Cells Few Few /hpf    Trans Epithelial Cells Few /hpf    Mucous Threads Few /hpf    Amorphous Crystal Present /hpf    Hyaline Cast 6-10 (A) /lpf   ESTIMATED GFR   Result Value Ref Range    GFR If  32 (A) >60 mL/min/1.73 m 2    GFR If Non African American 26 (A) >60 mL/min/1.73 m 2   Prothrombin time (INR)   Result Value Ref Range    PT 16.6 (H) 12.0 - 14.6 sec    INR 1.38 (H) 0.87 - 1.13       RADIOLOGY  CT-ABDOMEN-PELVIS W/O   Final Result      1.  Moderate size bilateral pleural effusions.      2.  Small volume of abdominal and moderate volume of pelvic ascites.      3.  Diffuse fatty infiltration appearance of the liver.      4.  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 There is suspected dilatation of the endometrial complex.      5.  Cystic structure in the right adnexa measuring 5.5 cm which may represent right ovarian cyst.            COURSE & MEDICAL DECISION MAKING  I have reviewed any medical record information, laboratory studies and radiographic results as noted above.  Differential diagnoses includes: Sepsis, UTI, dehydration, electrolyte abnormalities, anemia    Encounter Summary: This is a very pleasant 71 y.o. female who unfortunately required evaluation in the emergency department today with worsening abdominal pain and other symptoms related to a recently diagnosed urinary tract infection for which she has not been able to take her medication at home because of difficulty swallowing.  She presents tachycardic, she is tachypneic, she is very ill-appearing.  She appears septic.  Blood pressure is normal.  Administer IV fluids.  Will administer ceftriaxone, will check the regular septic work-up and she will be reevaluated.  Her urine culture actually came back negative from a few days ago so a CT of abdomen pelvis will be obtained to expand the search for etiology  -------- WBC greater than 25,000,  blood work reveals acute renal injury with minor hyperkalemia.  CT scan does not reveal any acute inflammatory process although limited by the lack of IV contrast because of her kidney injury.  CT scan does reveal bilateral pleural effusions.  At this point the patient has severe sepsis with renal dysfunction, I do believe the source is her urine.  Treated with ceftriaxone.  Admitted to the hospital in guarded condition    CRITICAL CARE  The very real possibilty of a deterioration of this patient's condition required the highest level of my preparedness for sudden, emergent intervention.  I provided critical care services, which included medication orders, frequent reevaluations of the patient's condition and response to treatment, ordering and reviewing test results, and discussing the case with various consultants.  The critical care time associated with the care of the patient was 39 minutes. Review chart for interventions. This time is exclusive of any other billable procedures.         DISPOSITION: Admit in guarded condition      FINAL IMPRESSION  1. Sepsis with acute renal failure without septic shock, due to unspecified organism, unspecified acute renal failure type (HCC)    2. Acute cystitis without hematuria    3. Dehydration           This dictation was created using voice recognition software. The accuracy of the dictation is limited to the abilities of the software. I expect there may be some errors of grammar and possibly content. The nursing notes were reviewed and certain aspects of this information were incorporated into this note.    Electronically signed by: Ashok Ku M.D., 7/12/2021 3:49 PM

## 2021-07-12 NOTE — ED NOTES
Med rec complete per pt. Pt was prescribed ABX but was unable to start. Per pt, pills were to big to swallow.

## 2021-07-13 NOTE — PROGRESS NOTES
Monitor Summary     Rhythm:SR/ST   Measurements: 0.12/0.08/0.32  ECTOPIES: none        Normal Values  Rhythm SR  HR Range    Measurements 0.12-0.20 / 0.06-0.10  / 0.30-0.52

## 2021-07-13 NOTE — PROGRESS NOTES
" Pt had reported 8/10 pain upon arrival but declined pain medication or non-pharmacological intervention.  I educated pt on all the options to help relieve pain. MD notified and new orders were put in. RN informed pt that there was tylenol now available for pain. Pt pain was still 7/10 but pt declined medication. I let pt know it was available if she needed it. After a few hours pt did request for the medication but only wanted to take half stating, \"I will overdose if I take two pills\". Pt was educated, but pt still declined 2nd pill of tylenol.   "

## 2021-07-13 NOTE — DIETARY
"Nutrition services: Day 1 of admit.  Madelyn Montes is a 71 y.o. female with admitting DX of Sepsis    Consult received for MST of 3 on nutrition screen due to report of unsure weight loss and poor PO PTA. Pt reports UBW of 91.6 kg (202 lb) PTA.       Assessment:  Height: 170.2 cm (5' 7\")  Weight: 92 kg (202 lb 13.2 oz)(bed scale)  Body mass index is 31.77 kg/m²., BMI classification: class 1 obesity  Admit weight : 89.8 kg (198 lb) stand up scale)  Diet/Intake: regular/50-75% at breakfast    Evaluation:   1. DX includes acute cystitis, CAROLIN (acute kidney injury), hyponatremia, hyperkalemia.   2. Weights reviewed in Epic:  1. 6/26/21=91.7 kg  2. 5/27/21=90.7 kg  Based on admit weight of 89.8 kg, weight loss has not been significant.    3. RD attempted to see pt x 2. Pt was either busy with other staff or not in her room. PO intake at breakfast was adequate.     4. Skin: redness and excoriation noted on coccyx/buttocks.   5. Labs: 7/13/21: sodium=127 (L), potassium=5.2, glucose=128    Malnutrition Risk: criteria not met    Recommendations/Plan:   1. Encourage intake of >50%  2. Document intake of all meals  as % taken in ADL's to provide interdisciplinary communication across all shifts.   3. Monitor weight.  4. Nutrition rep will continue to see patient for ongoing meal and snack preferences.     RD will follow.        "

## 2021-07-13 NOTE — PROGRESS NOTES
[facility] Medicine Daily Progress Note    Date of Service  7/13/2021    Chief Complaint  Madelyn Montes is a 71 y.o. female admitted 7/12/2021 with nausea and vomiting and bladder pain.    Hospital Course  Madelyn Montes is a 71 y.o. female with generalized weakness fevers chills, suprapubic pain dysuria and frequency.  Apparently patient was evaluated 5 days prior and was prescribed Augmentin for a urinary tract infection however she was not able to take those antibiotics due to the size of the pill which was inducing nausea and she felt she had difficulty with swallowing.  She felt her generalized weakness fevers chills and abdominal pain worsened.  She reports that the pain is in suprapubic region no radiation to other places no relieving or aggravating factors.  WBC was elevated at 25.3 and lactic acid 3.7.        Interval Problem Update  7/13 Patient states she is feeling overall better but concerned she isn't receiving her OTC eye drops that she takes for her retinal tear.  I've spoken with pharmacy who has her drops to let her have them at bedside and administer ad duy.  WBC and Lactic acid have dropped with antibiotics and fluids.  She will need a liquid preparation of antibiotic on discharge.  Urine culture from 7/7 showing no growth except normal skin estela.      I have personally seen and examined the patient at bedside. I discussed the plan of care with patient, bedside RN, charge RN,  and pharmacy.    Consultants/Specialty      Code Status  Full Code    Disposition  Patient is not medically cleared.   Anticipate discharge to to home with close outpatient follow-up.  I have placed the appropriate orders for post-discharge needs.    Review of Systems  Review of Systems   Constitutional: Negative for chills and fever.   HENT: Negative for congestion and sore throat.    Eyes: Negative for blurred vision and photophobia.   Respiratory: Negative for cough and shortness of breath.    Cardiovascular:  Negative for chest pain, claudication and leg swelling.   Gastrointestinal: Negative for abdominal pain, constipation, diarrhea, heartburn and vomiting.   Genitourinary: Negative for dysuria and hematuria.   Musculoskeletal: Positive for myalgias (generalized). Negative for joint pain.   Skin: Negative for itching and rash.   Neurological: Negative for dizziness, sensory change, speech change, weakness and headaches.   Psychiatric/Behavioral: Negative for depression. The patient is not nervous/anxious and does not have insomnia.         Physical Exam  Temp:  [36.4 °C (97.5 °F)-36.4 °C (97.6 °F)] 36.4 °C (97.6 °F)  Pulse:  [] 89  Resp:  [16-19] 19  BP: (125-161)/(55-79) 129/55  SpO2:  [89 %-93 %] 90 %    Physical Exam  Vitals and nursing note reviewed.   Constitutional:       General: She is not in acute distress.     Appearance: Normal appearance. She is not ill-appearing.   HENT:      Head: Normocephalic and atraumatic.      Nose: Nose normal.   Eyes:      General: No scleral icterus.  Cardiovascular:      Rate and Rhythm: Normal rate and regular rhythm.      Heart sounds: Normal heart sounds. No murmur heard.     Pulmonary:      Effort: Pulmonary effort is normal.      Breath sounds: Normal breath sounds.   Abdominal:      General: Bowel sounds are normal. There is no distension.      Palpations: Abdomen is soft.   Musculoskeletal:         General: No swelling or tenderness.      Cervical back: Neck supple.   Skin:     General: Skin is warm and dry.   Neurological:      General: No focal deficit present.      Mental Status: She is alert and oriented to person, place, and time.   Psychiatric:         Mood and Affect: Mood normal.         Fluids    Intake/Output Summary (Last 24 hours) at 7/13/2021 1114  Last data filed at 7/13/2021 0335  Gross per 24 hour   Intake 1100 ml   Output 100 ml   Net 1000 ml       Laboratory  Recent Labs     07/12/21  1600 07/13/21  0154   WBC 25.3* 21.1*   RBC 5.50* 4.87    HEMOGLOBIN 16.0 14.3   HEMATOCRIT 46.4 41.6   MCV 84.4 85.4   MCH 29.1 29.4   MCHC 34.5 34.4   RDW 38.8 39.8   PLATELETCT 649* 509*   MPV 9.2 9.2     Recent Labs     07/13/21  0154 07/13/21  0626 07/13/21  1013   SODIUM 126* 125* 127*   POTASSIUM 5.3 5.3 5.2   CHLORIDE 92* 92* 93*   CO2 21 20 17*   GLUCOSE 120* 115* 128*   BUN 56* 53* 56*   CREATININE 1.59* 1.48* 1.50*   CALCIUM 8.3* 8.1* 8.4     Recent Labs     07/12/21  1830   INR 1.38*               Imaging  CT-ABDOMEN-PELVIS W/O   Final Result      1.  Moderate size bilateral pleural effusions.      2.  Small volume of abdominal and moderate volume of pelvic ascites.      3.  Diffuse fatty infiltration appearance of the liver.      4.  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 There is suspected dilatation of the endometrial complex.      5.  Cystic structure in the right adnexa measuring 5.5 cm which may represent right ovarian cyst.           Assessment/Plan  * Sepsis (HCC)- (present on admission)  Assessment & Plan  This is Sepsis Present on admission  SIRS criteria identified on my evaluation include: Tachycardia, with heart rate greater than 90 BPM and Leukocytosis, with WBC greater than 12,000  Source is urinary tract infection  Sepsis protocol initiated  Fluid resuscitation ordered per protocol  IV antibiotics as appropriate for source of sepsis  While organ dysfunction may be noted elsewhere in this problem list or in the chart, degree of organ dysfunction does not meet CMS criteria for severe sepsis    CAROLIN (acute kidney injury) (HCC)- (present on admission)  Assessment & Plan  Prerenal secondary to reduced intravascular volume secondary to sepsis.    Intravenous fluids  Avoid nephrotoxins, monitor inputs and outputs      Hyponatremia- (present on admission)  Assessment & Plan  Hypovolemic hyponatremia   Na has improved with IVF       Hyperkalemia- (present on admission)  Assessment & Plan  Likely  secondary to acute kidney injury/severe dehydration.  Continuous cardiac monitoring, anticipate improvement with rehydration  Consider dextrose/insulin, bicarb according to trend    Acute cystitis without hematuria- (present on admission)  Assessment & Plan  Continue ceftriaxone   Continue for now follow-up cultures and sensitivities    Seasonal allergic rhinitis- (present on admission)  Assessment & Plan  Resume home sertraline         VTE prophylaxis: enoxaparin ppx    I have performed a physical exam and reviewed and updated ROS and Plan today (7/13/2021). In review of yesterday's note (7/12/2021), there are no changes except as documented above.

## 2021-07-13 NOTE — HOSPITAL COURSE
Madelyn Montes is a 71 y.o. female with generalized weakness fevers chills, suprapubic pain dysuria and frequency.  Apparently patient was evaluated 5 days prior and was prescribed Augmentin for a urinary tract infection however she was not able to take those antibiotics due to the size of the pill which was inducing nausea and she felt she had difficulty with swallowing.  She felt her generalized weakness fevers chills and abdominal pain worsened.  She reports that the pain is in suprapubic region no radiation to other places no relieving or aggravating factors.  WBC was elevated at 25.3 and lactic acid 3.7.

## 2021-07-13 NOTE — ASSESSMENT & PLAN NOTE
Prerenal secondary to reduced intravascular volume secondary to sepsis.    Intravenous fluids  Avoid nephrotoxins, monitor inputs and outputs

## 2021-07-13 NOTE — CARE PLAN
Problem: Pain - Standard  Goal: Alleviation of pain or a reduction in pain to the patient’s comfort goal  Outcome: Progressing  Note: Pt reported pain this morning but declined any interventions. Pt agreed to sit up in cardiac chair.      Problem: Hemodynamics  Goal: Patient's hemodynamics, fluid balance and neurologic status will be stable or improve  Outcome: Progressing  Note: Lactic is trending down, WBC count is lowering. VSS.    The patient is Stable - Low risk of patient condition declining or worsening    Shift Goals  Clinical Goals: abx administration, monitor pain  Patient Goals: rest, improvement of infection    Progress made toward(s) clinical / shift goals:  abx administered, pt updated on plan of care, reports intermittent pain which is tolerable for her.     Patient is not progressing towards the following goals:

## 2021-07-13 NOTE — CARE PLAN
"The patient is Stable - Low risk of patient condition declining or worsening    Shift Goals  Clinical Goals: Decrease pain     Progress made toward(s) clinical / shift goals:    Problem: Knowledge Deficit - Standard  Goal: Patient and family/care givers will demonstrate understanding of plan of care, disease process/condition, diagnostic tests and medications  Outcome: Progressing   Discussed POC with pt, oriented pt to her room on arrival to floor from the ED. Answered all questions and concerns. Pt verbalizes understanding.     Patient is not progressing towards the following goals:      Problem: Pain - Standard  Goal: Alleviation of pain or a reduction in pain to the patient’s comfort goal  Outcome: Not Progressing   Pt reports 8/10 pain but declines pain medication or non-pharmacological intervention.  I educated pt on all the options to help relieve pain. MD notified and new orders were put in. RN informed pt that there was tylenol now available for pain. Pt pain was still 7/10 but pt declined medication. I let pt know it was available if she needed it. After a few hours pt did request for the medication but only wanted to take half stating, \"I will overdose if I take two pills\".   "

## 2021-07-13 NOTE — PROGRESS NOTES
Monitor Summary     Rhythm: SR-ST   Measurements: 0.14/0.10/0.36  ECTOPIES: rPVC        Normal Values  Rhythm SR  HR Range    Measurements 0.12-0.20 / 0.06-0.10  / 0.30-0.52

## 2021-07-13 NOTE — ASSESSMENT & PLAN NOTE
This is Sepsis Present on admission  SIRS criteria identified on my evaluation include: Tachycardia, with heart rate greater than 90 BPM and Leukocytosis, with WBC greater than 12,000  Source is urinary tract infection  Sepsis protocol initiated  Fluid resuscitation ordered per protocol  IV antibiotics as appropriate for source of sepsis  While organ dysfunction may be noted elsewhere in this problem list or in the chart, degree of organ dysfunction does not meet CMS criteria for severe sepsis

## 2021-07-13 NOTE — PROGRESS NOTES
4 Eyes Skin Assessment Completed by CAMELIA Finley and CAMELIA López.    Head WDL  Ears WDL  Nose WDL  Mouth WDL  Neck WDL  Breast/Chest WDL  Shoulder Blades WDL  Spine WDL  (R) Arm/Elbow/Hand WDL  (L) Arm/Elbow/Hand WDL  Abdomen WDL  Groin WDL  Scrotum/Coccyx/Buttocks Redness and Excoriation  (R) Leg WDL  (L) Leg WDL  (R) Heel/Foot/Toe WDL  (L) Heel/Foot/Toe WDL          Devices In Places Tele Box, Blood Pressure Cuff and Pulse Ox      Interventions In Place Pillows, Barrier Cream and Pressure Redistribution Mattress    Possible Skin Injury Yes    Pictures Uploaded Into Epic Yes  Wound Consult Placed Yes  RN Wound Prevention Protocol Ordered Yes

## 2021-07-13 NOTE — H&P
"Hospital Medicine History & Physical Note    Date of Service  7/12/2021    Primary Care Physician  Pcp Pt States None    Consultants  None    Code Status  Full Code    Chief Complaint  Chief Complaint   Patient presents with   • Abdominal Pain     Mid and low  Seen here at ER 5 d ago and Dx UTI Unable to swallow her oral ABX due to \" strong gag reflex \"     History of Presenting Illness  Madelyn Montes is a 71 y.o. female with a past medical history of elevated BMI and allergies who presented 7/12/2021 with generalized weakness fevers chills, suprapubic pain dysuria and frequency.  Apparently patient was evaluated 5 days prior and was prescribed Augmentin for a urinary tract infection however she was not able to take those antibiotics \"nausea, unable to swallow\" her generalized weakness fevers chills and abdominal pain worsened.  She reports that the pain is in suprapubic region no radiation to other places no relieving or aggravating factors.  Rated as severe 10 out of 10.  She denies having lower extremity pain redness or swelling.  She denies having cough shortness of breath..    I discussed the plan of care with patient.    Review of Systems  Review of Systems   Constitutional: Positive for malaise/fatigue. Negative for chills and fever.   Eyes: Positive for photophobia (Chronic). Negative for discharge and redness.   Respiratory: Negative for cough, shortness of breath and stridor.    Cardiovascular: Negative for chest pain and leg swelling.   Gastrointestinal: Positive for abdominal pain (Suprapubic) and nausea. Negative for vomiting.   Genitourinary: Positive for dysuria and frequency. Negative for flank pain.   Musculoskeletal: Negative for myalgias.   Skin: Negative.    Neurological: Negative for focal weakness.   Endo/Heme/Allergies: Does not bruise/bleed easily.   Psychiatric/Behavioral: The patient is not nervous/anxious.      Past Medical History   has a past medical history of History of [place] fever, Photophobia, and Retina disorder.    Surgical History   has a past surgical history that includes cataract extraction with iol (Bilateral) and other.     Family History  family history includes Cancer in her mother.     Social History   reports that she has never smoked. She has never used smokeless tobacco. She reports current alcohol use. She reports that she does not use drugs.    Allergies  Allergies   Allergen Reactions   • Soy Swelling     Swelling along face left   • Aloe Vera Swelling     Per pt reports that where it was applied she swelled up    • Pcn [Penicillins] Rash     HAS TAKEN AMOXICILLIN BEFORE   • Shellfish Allergy      Made stomach upset, pt states she felt that she needed an antihistamine the last time she had shellfish     • Sulfa Drugs Nausea and Anxiety     Medications  Prior to Admission Medications   Prescriptions Last Dose Informant Patient Reported? Taking?   amoxicillin-clavulanate (AUGMENTIN) 875-125 MG Tab NEW RX at NEW RX  No No   Sig: Take 1 tablet by mouth 2 times a day for 10 days.   cetirizine (ZYRTEC) 10 MG Tab 7/12/2021 at Unknown time  Yes Yes   Sig: Take 10 mg by mouth every day.      Facility-Administered Medications: None     Physical Exam  Temp:  [36.4 °C (97.6 °F)] 36.4 °C (97.6 °F)  Pulse:  [] 99  Resp:  [16] 16  BP: (126-161)/(60-79) 161/62  SpO2:  [89 %-93 %] 92 %    Physical Exam  Constitutional:       General: She is not in acute distress.  HENT:      Head: Normocephalic and atraumatic.      Right Ear: External ear normal.      Left Ear: External ear normal.      Nose: No congestion or rhinorrhea.      Mouth/Throat:      Mouth: Mucous membranes are moist.      Pharynx: No oropharyngeal exudate or posterior oropharyngeal erythema.   Eyes:      General: No scleral icterus.        Right eye: No discharge.         Left eye: No discharge.      Conjunctiva/sclera: Conjunctivae normal.      Pupils: Pupils are equal, round, and reactive to light.       Comments: Photosensitivity   Cardiovascular:      Rate and Rhythm: Tachycardia present.      Heart sounds: No friction rub. No gallop.    Pulmonary:      Effort: Pulmonary effort is normal.   Abdominal:      General: Abdomen is flat. There is no distension.      Tenderness: There is no guarding.   Musculoskeletal:         General: No swelling.      Cervical back: Neck supple. No rigidity. No muscular tenderness.      Right lower leg: No edema.      Left lower leg: No edema.   Skin:     Capillary Refill: Capillary refill takes 2 to 3 seconds.      Coloration: Skin is not jaundiced or pale.      Findings: No bruising or erythema.   Neurological:      Mental Status: She is alert and oriented to person, place, and time.   Psychiatric:         Mood and Affect: Mood normal.         Judgment: Judgment normal.       Laboratory:  Recent Labs     07/12/21  1600   WBC 25.3*   RBC 5.50*   HEMOGLOBIN 16.0   HEMATOCRIT 46.4   MCV 84.4   MCH 29.1   MCHC 34.5   RDW 38.8   PLATELETCT 649*   MPV 9.2     Recent Labs     07/12/21  1600 07/12/21  2051   SODIUM 122* 125*   POTASSIUM 5.7* 5.2   CHLORIDE 85* 91*   CO2 16* 19*   GLUCOSE 141* 118*   BUN 60* 58*   CREATININE 1.90* 1.64*   CALCIUM 9.6 8.5     Recent Labs     07/12/21  1600 07/12/21  2051   ALTSGPT 16  --    ASTSGOT 25  --    ALKPHOSPHAT 132*  --    TBILIRUBIN 1.0  --    GLUCOSE 141* 118*     Recent Labs     07/12/21  1830   INR 1.38*     No results for input(s): NTPROBNP in the last 72 hours.      No results for input(s): TROPONINT in the last 72 hours.    Imaging:  CT-ABDOMEN-PELVIS W/O   Final Result      1.  Moderate size bilateral pleural effusions.      2.  Small volume of abdominal and moderate volume of pelvic ascites.      3.  Diffuse fatty infiltration appearance of the liver.      4.  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 There is suspected dilatation of the endometrial complex.      5.  Cystic structure in the  right adnexa measuring 5.5 cm which may represent right ovarian cyst.        Assessment/Plan:  I anticipate this patient will require at least two midnights for appropriate medical management, necessitating inpatient admission.    * Sepsis (HCC)- (present on admission)  Assessment & Plan  This is Sepsis Present on admission  SIRS criteria identified on my evaluation include: Tachycardia, with heart rate greater than 90 BPM and Leukocytosis, with WBC greater than 12,000  Source is urinary tract infection  Sepsis protocol initiated  Fluid resuscitation ordered per protocol  IV antibiotics as appropriate for source of sepsis  While organ dysfunction may be noted elsewhere in this problem list or in the chart, degree of organ dysfunction does not meet CMS criteria for severe sepsis    Acute cystitis without hematuria- (present on admission)  Assessment & Plan  Started on ceftriaxone in the emergency department.  Continue for now follow-up cultures and sensitivities    CAROLIN (acute kidney injury) (HCC)- (present on admission)  Assessment & Plan  Prerenal secondary to reduced intravascular volume secondary to sepsis.    Intravenous fluids  Avoid nephrotoxins, monitor inputs and outputs  I will check bladder scans    Hyponatremia- (present on admission)  Assessment & Plan  Hypovolemic hyponatremia   I expect Na to improve with IVF  I am ordering a BMPs every 4 hours, avoid over correction, aim for 8-10 meq correction in 24 hours      Hyperkalemia- (present on admission)  Assessment & Plan  Likely secondary to acute kidney injury/severe dehydration.  Continuous cardiac monitoring, anticipate improvement with rehydration  Consider dextrose/insulin, bicarb according to trend    Seasonal allergic rhinitis- (present on admission)  Assessment & Plan  Resume home sertraline    VTE prophylaxis: enoxaparin ppx

## 2021-07-13 NOTE — ASSESSMENT & PLAN NOTE
Likely secondary to acute kidney injury/severe dehydration.  Continuous cardiac monitoring, anticipate improvement with rehydration  Consider dextrose/insulin, bicarb according to trend

## 2021-07-14 PROBLEM — D72.829 LEUKOCYTOSIS: Status: ACTIVE | Noted: 2021-01-01

## 2021-07-14 PROBLEM — N85.8 UTERINE MASS: Status: ACTIVE | Noted: 2021-01-01

## 2021-07-14 PROBLEM — K59.00 CONSTIPATION: Status: ACTIVE | Noted: 2021-01-01

## 2021-07-14 NOTE — PROGRESS NOTES
Assumed pt care. AOx4. Pt reports 7/10 pain but declines medication.  Denies any other distress.  Discussed POC.  Pt verbalized understanding.  Hourly rounding in place. Fall precautions in place and call lights w/in reach.

## 2021-07-14 NOTE — H&P
"Hospital Medicine History & Physical Note    Date of Service  7/14/2021    Primary Care Physician  Pcp Pt States None    Consultants  oncology    Specialist Names: John Dejesus    Code Status  Full Code    Chief Complaint  Chief Complaint   Patient presents with   • Abdominal Pain     Mid and low  Seen here at [facility] 5 d ago and Dx UTI Unable to swallow her oral ABX due to \" strong gag reflex \"       History of Presenting Illness  Madelyn Montes is a 71 y.o. female with generalized weakness fevers chills, suprapubic pain dysuria and frequency.  Apparently patient was evaluated 5 days prior and was prescribed Augmentin for a urinary tract infection however she was not able to take those antibiotics due to the size of the pill which was inducing nausea and she felt she had difficulty with swallowing.  She felt her generalized weakness fevers chills and abdominal pain worsened.  She reports that the pain is in suprapubic region no radiation to other places no relieving or aggravating factors.  WBC was elevated at 25.3 and lactic acid 3.7.  Initially there was concern of UTI causing sepsis but without improvement in her symptoms with rocephin and WBC rising again, discussion of her CT results was held with Dr Dejesus with concern of pleural effusion, ascites and uterine mass.  He recommends tapping of the pleural effusion and possibly her ascites for cytology and culture and following this expanding to broader antibiotic coverage pending results of cytology.  Once transferred to [facility], he will be available for consultation on 7/15.  Patient is agreeable to transfer for specialty consultation.    I discussed the plan of care with patient, bedside RN, charge RN and .    Review of Systems  ROS    Past Medical History   has a past medical history of History of [place] fever, Photophobia, and Retina disorder.    Surgical History   has a past surgical history that includes cataract extraction with iol " (Bilateral) and other.     Family History  family history includes Cancer in her mother.   Family history reviewed with patient. There is no family history that is pertinent to the chief complaint.     Social History   reports that she has never smoked. She has never used smokeless tobacco. She reports current alcohol use. She reports that she does not use drugs.    Allergies  Allergies   Allergen Reactions   • Soy Swelling     Swelling along face left   • Aloe Vera Swelling     Per pt reports that where it was applied she swelled up    • Pcn [Penicillins] Rash     HAS TAKEN AMOXICILLIN BEFORE   • Shellfish Allergy      Made stomach upset, pt states she felt that she needed an antihistamine the last time she had shellfish     • Sulfa Drugs Nausea and Anxiety       Medications  Prior to Admission Medications   Prescriptions Last Dose Informant Patient Reported? Taking?   amoxicillin-clavulanate (AUGMENTIN) 875-125 MG Tab NEW RX at NEW RX  No No   Sig: Take 1 tablet by mouth 2 times a day for 10 days.   artificial tears (EYE LUBRICANT) Ointment ophth ointment 7/12/2021 at noon   Yes Yes   Sig: Apply 1 Application to both eyes as needed.   cetirizine (ZYRTEC) 10 MG Tab 7/12/2021 at Unknown time  Yes Yes   Sig: Take 10 mg by mouth every day.      Facility-Administered Medications: None       Physical Exam  Temp:  [36.3 °C (97.3 °F)-36.6 °C (97.9 °F)] 36.4 °C (97.5 °F)  Pulse:  [] 99  Resp:  [18-20] 18  BP: (129-148)/(60-79) 129/66  SpO2:  [89 %-91 %] 90 %    Physical Exam    Laboratory:  Recent Labs     07/12/21  1600 07/13/21  0154 07/14/21  0421   WBC 25.3* 21.1* 24.5*   RBC 5.50* 4.87 4.87   HEMOGLOBIN 16.0 14.3 14.0   HEMATOCRIT 46.4 41.6 42.4   MCV 84.4 85.4 87.1   MCH 29.1 29.4 28.7   MCHC 34.5 34.4 33.0*   RDW 38.8 39.8 41.4   PLATELETCT 649* 509* 493*   MPV 9.2 9.2 9.3     Recent Labs     07/13/21  0626 07/13/21  1013 07/14/21  0421   SODIUM 125* 127* 128*   POTASSIUM 5.3 5.2 4.9   CHLORIDE 92* 93* 97    CO2 20 17* 16*   GLUCOSE 115* 128* 124*   BUN 53* 56* 57*   CREATININE 1.48* 1.50* 1.34   CALCIUM 8.1* 8.4 8.4     Recent Labs     07/12/21  1600 07/12/21  2051 07/13/21  0154 07/13/21  0154 07/13/21  0626 07/13/21  1013 07/14/21  0421   ALTSGPT 16  --  13  --   --   --   --    ASTSGOT 25  --  23  --   --   --   --    ALKPHOSPHAT 132*  --  105*  --   --   --   --    TBILIRUBIN 1.0  --  0.7  --   --   --   --    GLUCOSE 141*   < > 120*   < > 115* 128* 124*    < > = values in this interval not displayed.     Recent Labs     07/12/21  1830   INR 1.38*     No results for input(s): NTPROBNP in the last 72 hours.      No results for input(s): TROPONINT in the last 72 hours.    Imaging:  CT-ABDOMEN-PELVIS W/O   Final Result      1.  Moderate size bilateral pleural effusions.      2.  Small volume of abdominal and moderate volume of pelvic ascites.      3.  Diffuse fatty infiltration appearance of the liver.      4.  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 There is suspected dilatation of the endometrial complex.      5.  Cystic structure in the right adnexa measuring 5.5 cm which may represent right ovarian cyst.      IR-THORACENTESIS PUNCTURE RIGHT    (Results Pending)       no X-Ray or EKG requiring interpretation    Assessment/Plan:  I anticipate this patient will require at least two midnights for appropriate medical management, necessitating inpatient admission.    * Uterine mass  Assessment & Plan  Dysfunctional uterine bleeding since May with intermittent pain and night sweats  Now with leukocytosis and abdominal pain without obvious infection.       Leukocytosis  Assessment & Plan  UTI symptoms but culture negative  Concern leukocytosis is secondary to leukomoid reaction to possible malignancy vs undertreated infection  Once thoracentesis completed - broaden antibiotics (vanco/unasyn)  Continue current antibiotics.      Sepsis (HCC)- (present on  admission)  Assessment & Plan  This is Sepsis Present on admission  SIRS criteria identified on my evaluation include: Tachycardia, with heart rate greater than 90 BPM and Leukocytosis, with WBC greater than 12,000  Source is urinary tract infection  Sepsis protocol initiated  Fluid resuscitation ordered per protocol  IV antibiotics as appropriate for source of sepsis  While organ dysfunction may be noted elsewhere in this problem list or in the chart, degree of organ dysfunction does not meet CMS criteria for severe sepsis    Constipation  Assessment & Plan  Ambulate, bowel protocol      CAROLIN (acute kidney injury) (HCC)- (present on admission)  Assessment & Plan  Prerenal secondary to reduced intravascular volume secondary to sepsis.    Intravenous fluids  Avoid nephrotoxins, monitor inputs and outputs      Hyponatremia- (present on admission)  Assessment & Plan  Hypovolemic hyponatremia   Na has improved with IVF       Hyperkalemia- (present on admission)  Assessment & Plan  Likely secondary to acute kidney injury/severe dehydration.  Continuous cardiac monitoring, anticipate improvement with rehydration  Consider dextrose/insulin, bicarb according to trend    Acute cystitis without hematuria- (present on admission)  Assessment & Plan  Continue ceftriaxone   Cultures negative      Seasonal allergic rhinitis- (present on admission)  Assessment & Plan  Resume home sertraline      VTE prophylaxis: enoxaparin ppx

## 2021-07-14 NOTE — DISCHARGE INSTRUCTIONS
Discharge Instructions    Discharged to home by car with relative. Discharged via wheelchair, hospital escort: Yes.  Special equipment needed: Not Applicable    Be sure to schedule a follow-up appointment with your primary care doctor or any specialists as instructed.     Discharge Plan:   Diet Plan: Discussed  Activity Level: Discussed  Confirmed Follow up Appointment: Patient to Call and Schedule Appointment  Confirmed Symptoms Management: Discussed  Medication Reconciliation Updated: Yes    I understand that a diet low in cholesterol, fat, and sodium is recommended for good health. Unless I have been given specific instructions below for another diet, I accept this instruction as my diet prescription.   Other diet: Regular    Special Instructions: None    · Is patient discharged on Warfarin / Coumadin?   No       Urinary Tract Infection, Adult  A urinary tract infection (UTI) is an infection of any part of the urinary tract. The urinary tract includes:  · The kidneys.  · The ureters.  · The bladder.  · The urethra.  These organs make, store, and get rid of pee (urine) in the body.  What are the causes?  This is caused by germs (bacteria) in your genital area. These germs grow and cause swelling (inflammation) of your urinary tract.  What increases the risk?  You are more likely to develop this condition if:  · You have a small, thin tube (catheter) to drain pee.  · You cannot control when you pee or poop (incontinence).  · You are female, and:  ? You use these methods to prevent pregnancy:  ? A medicine that kills sperm (spermicide).  ? A device that blocks sperm (diaphragm).  ? You have low levels of a female hormone (estrogen).  ? You are pregnant.  · You have genes that add to your risk.  · You are sexually active.  · You take antibiotic medicines.  · You have trouble peeing because of:  ? A prostate that is bigger than normal, if you are male.  ? A blockage in the part of your body that drains pee from the  bladder (urethra).  ? A kidney stone.  ? A nerve condition that affects your bladder (neurogenic bladder).  ? Not getting enough to drink.  ? Not peeing often enough.  · You have other conditions, such as:  ? Diabetes.  ? A weak disease-fighting system (immune system).  ? Sickle cell disease.  ? Gout.  ? Injury of the spine.  What are the signs or symptoms?  Symptoms of this condition include:  · Needing to pee right away (urgently).  · Peeing often.  · Peeing small amounts often.  · Pain or burning when peeing.  · Blood in the pee.  · Pee that smells bad or not like normal.  · Trouble peeing.  · Pee that is cloudy.  · Fluid coming from the vagina, if you are female.  · Pain in the belly or lower back.  Other symptoms include:  · Throwing up (vomiting).  · No urge to eat.  · Feeling mixed up (confused).  · Being tired and grouchy (irritable).  · A fever.  · Watery poop (diarrhea).  How is this treated?  This condition may be treated with:  · Antibiotic medicine.  · Other medicines.  · Drinking enough water.  Follow these instructions at home:    Medicines  · Take over-the-counter and prescription medicines only as told by your doctor.  · If you were prescribed an antibiotic medicine, take it as told by your doctor. Do not stop taking it even if you start to feel better.  General instructions  · Make sure you:  ? Pee until your bladder is empty.  ? Do not hold pee for a long time.  ? Empty your bladder after sex.  ? Wipe from front to back after pooping if you are a female. Use each tissue one time when you wipe.  · Drink enough fluid to keep your pee pale yellow.  · Keep all follow-up visits as told by your doctor. This is important.  Contact a doctor if:  · You do not get better after 1-2 days.  · Your symptoms go away and then come back.  Get help right away if:  · You have very bad back pain.  · You have very bad pain in your lower belly.  · You have a fever.  · You are sick to your stomach (nauseous).  · You are  throwing up.  Summary  · A urinary tract infection (UTI) is an infection of any part of the urinary tract.  · This condition is caused by germs in your genital area.  · There are many risk factors for a UTI. These include having a small, thin tube to drain pee and not being able to control when you pee or poop.  · Treatment includes antibiotic medicines for germs.  · Drink enough fluid to keep your pee pale yellow.  This information is not intended to replace advice given to you by your health care provider. Make sure you discuss any questions you have with your health care provider.  Document Released: 06/05/2009 Document Revised: 12/05/2019 Document Reviewed: 06/27/2019  [employer] Patient Education © 2020 [employer]      Depression / Suicide Risk    As you are discharged from this [facility] facility, it is important to learn how to keep safe from harming yourself.    Recognize the warning signs:  · Abrupt changes in personality, positive or negative- including increase in energy   · Giving away possessions  · Change in eating patterns- significant weight changes-  positive or negative  · Change in sleeping patterns- unable to sleep or sleeping all the time   · Unwillingness or inability to communicate  · Depression  · Unusual sadness, discouragement and loneliness  · Talk of wanting to die  · Neglect of personal appearance   · Rebelliousness- reckless behavior  · Withdrawal from people/activities they love  · Confusion- inability to concentrate     If you or a loved one observes any of these behaviors or has concerns about self-harm, here's what you can do:  · Talk about it- your feelings and reasons for harming yourself  · Remove any means that you might use to hurt yourself (examples: pills, rope, extension cords, firearm)  · Get professional help from the community (Mental Health, Substance Abuse, psychological counseling)  · Do not be alone:Call your Safe Contact- someone whom you trust who will be there  for you.  · Call your local CRISIS HOTLINE 898-7370 or 892-479-5648  · Call your local Children's Mobile Crisis Response Team [place] (742) 741-3669 or www.GOQii  · Call the toll free National Suicide Prevention Hotlines   · National Suicide Prevention Lifeline 028-407-GSXF (8408)  · National [employer] Line Network 800-SUICIDE (192-2129)

## 2021-07-14 NOTE — DISCHARGE PLANNING
Anticipated Discharge Disposition: Home     Action: Pt discussed during IDT rounds. Dr. Gordon anticipates pt to d/c tomorrow 7/15 in the afternoon. No d/c needs a this time. Pt has six clicks score of 18.     Barriers to Discharge: None    Plan: LSW to assist as needed     Care Transition Team Assessment    Information Source  Information Given By: Other (Comments)  Who is responsible for making decisions for patient? : Patient    Elopement Risk  Legal Hold: No  Ambulatory or Self Mobile in Wheelchair: Yes  Disoriented: No  Psychiatric Symptoms: None  History of Wandering: No  Elopement this Admit: No  Vocalizing Wanting to Leave: No  Displays Behaviors, Body Language Wanting to Leave: No-Not at Risk for Elopement  Elopement Risk: Not at Risk for Elopement    Interdisciplinary Discharge Planning  Lives with - Patient's Self Care Capacity: Alone and Able to Care For Self  Patient or legal guardian wants to designate a caregiver: No  Support Systems: Friends / Neighbors, Spouse / Significant Other  Housing / Facility: 2 Story Apartment / Condo  Patient Prefers to be Discharged to:: Home    Discharge Preparedness  What is your plan after discharge?: Home with help  What are your discharge supports?: Other (comment)    Finances  Financial Barriers to Discharge: No  Prescription Coverage: Yes    Vision / Hearing Impairment  Vision Impairment : Yes (light sensitivity)  Right Eye Vision: Impaired, Wears Glasses  Left Eye Vision: Impaired, Wears Glasses  Hearing Impairment : No    Domestic Abuse  Have you ever been the victim of abuse or violence?: No  Physical Abuse or Sexual Abuse: No  Verbal Abuse or Emotional Abuse: No  Possible Abuse/Neglect Reported to:: Not Applicable    Psychological Assessment  History of Substance Abuse: None  History of Psychiatric Problems: No    Discharge Risks or Barriers  Discharge risks or barriers?: No  Patient risk factors: Vulnerable adult    Anticipated Discharge Information  Discharge  Disposition: Discharged to home/self care (01)

## 2021-07-14 NOTE — CARE PLAN
Problem: Pain - Standard  Goal: Alleviation of pain or a reduction in pain to the patient’s comfort goal  Outcome: Progressing     Problem: Knowledge Deficit - Standard  Goal: Patient and family/care givers will demonstrate understanding of plan of care, disease process/condition, diagnostic tests and medications  Outcome: Progressing   The patient is Stable - Low risk of patient condition declining or worsening    Shift Goals  Clinical Goals: abx administration, pain monitoring  Patient Goals: imrprovement of infection    Progress made toward(s) clinical / shift goals:  abx administration, IV fluids, encouraged mobility, pt denies pain this morning    Patient is not progressing towards the following goals:

## 2021-07-14 NOTE — PROGRESS NOTES
[facility] Medicine Daily Progress Note    Date of Service  7/14/2021    Chief Complaint  Madelyn Montes is a 71 y.o. female admitted 7/12/2021 with nausea and vomiting and bladder pain.    Hospital Course  Madelyn Montes is a 71 y.o. female with generalized weakness fevers chills, suprapubic pain dysuria and frequency.  Apparently patient was evaluated 5 days prior and was prescribed Augmentin for a urinary tract infection however she was not able to take those antibiotics due to the size of the pill which was inducing nausea and she felt she had difficulty with swallowing.  She felt her generalized weakness fevers chills and abdominal pain worsened.  She reports that the pain is in suprapubic region no radiation to other places no relieving or aggravating factors.  WBC was elevated at 25.3 and lactic acid 3.7.        Interval Problem Update  7/13 Patient states she is feeling overall better but concerned she isn't receiving her OTC eye drops that she takes for her retinal tear.  I've spoken with pharmacy who has her drops to let her have them at bedside and administer ad duy.  WBC and Lactic acid have dropped with antibiotics and fluids.  She will need a liquid preparation of antibiotic on discharge.  Urine culture from 7/7 showing no growth except normal skin estela.    7/14 Patient having abdominal cramping and discomfort today, suspect is s/e of antibiotics and 2 days without bowel movement.  I've encouraged patient to get up with assistance and walker and staff to ambulate frequently in the hallway to pass gas and to help facilitate bowel movement.  She will continue with bowel protocol. The increase in her WBC could be attributed to constipation.  She remains afebrile and is receiving rocephin.  Blood and urine cultures without growth.    I have personally seen and examined the patient at bedside. I discussed the plan of care with patient, bedside RN, charge RN,  and  pharmacy.    Consultants/Specialty      Code Status  Full Code    Disposition  Patient is not medically cleared.   Anticipate discharge to to home with close outpatient follow-up.  I have placed the appropriate orders for post-discharge needs.    Review of Systems  Review of Systems   Constitutional: Negative for chills and fever.   HENT: Negative for congestion and sore throat.    Eyes: Negative for blurred vision and photophobia.   Respiratory: Negative for cough and shortness of breath.    Cardiovascular: Negative for chest pain, claudication and leg swelling.   Gastrointestinal: Positive for abdominal pain and constipation. Negative for diarrhea, heartburn and vomiting.   Genitourinary: Negative for dysuria and hematuria.   Musculoskeletal: Positive for myalgias (generalized). Negative for joint pain.   Skin: Negative for itching and rash.   Neurological: Negative for dizziness, sensory change, speech change, weakness and headaches.   Psychiatric/Behavioral: Negative for depression. The patient is not nervous/anxious and does not have insomnia.         Physical Exam  Temp:  [36.3 °C (97.3 °F)-36.9 °C (98.5 °F)] 36.4 °C (97.5 °F)  Pulse:  [] 99  Resp:  [18-20] 18  BP: (129-155)/(60-79) 129/66  SpO2:  [89 %-91 %] 90 %    Physical Exam  Vitals and nursing note reviewed.   Constitutional:       General: She is not in acute distress.     Appearance: Normal appearance. She is not ill-appearing.   HENT:      Head: Normocephalic and atraumatic.      Nose: Nose normal.   Eyes:      General: No scleral icterus.  Cardiovascular:      Rate and Rhythm: Normal rate and regular rhythm.      Heart sounds: Normal heart sounds. No murmur heard.     Pulmonary:      Effort: Pulmonary effort is normal.      Breath sounds: Normal breath sounds.   Abdominal:      General: Bowel sounds are normal. There is distension.      Palpations: Abdomen is soft.      Comments: Hyperactive bowel sounds     Musculoskeletal:         General:  No swelling or tenderness.      Cervical back: Neck supple.   Skin:     General: Skin is warm and dry.   Neurological:      General: No focal deficit present.      Mental Status: She is alert and oriented to person, place, and time.   Psychiatric:         Mood and Affect: Mood normal.         Fluids    Intake/Output Summary (Last 24 hours) at 7/14/2021 1129  Last data filed at 7/14/2021 0646  Gross per 24 hour   Intake --   Output 600 ml   Net -600 ml       Laboratory  Recent Labs     07/12/21  1600 07/13/21  0154 07/14/21  0421   WBC 25.3* 21.1* 24.5*   RBC 5.50* 4.87 4.87   HEMOGLOBIN 16.0 14.3 14.0   HEMATOCRIT 46.4 41.6 42.4   MCV 84.4 85.4 87.1   MCH 29.1 29.4 28.7   MCHC 34.5 34.4 33.0*   RDW 38.8 39.8 41.4   PLATELETCT 649* 509* 493*   MPV 9.2 9.2 9.3     Recent Labs     07/13/21  0626 07/13/21  1013 07/14/21  0421   SODIUM 125* 127* 128*   POTASSIUM 5.3 5.2 4.9   CHLORIDE 92* 93* 97   CO2 20 17* 16*   GLUCOSE 115* 128* 124*   BUN 53* 56* 57*   CREATININE 1.48* 1.50* 1.34   CALCIUM 8.1* 8.4 8.4     Recent Labs     07/12/21  1830   INR 1.38*               Imaging  CT-ABDOMEN-PELVIS W/O   Final Result      1.  Moderate size bilateral pleural effusions.      2.  Small volume of abdominal and moderate volume of pelvic ascites.      3.  Diffuse fatty infiltration appearance of the liver.      4.  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 There is suspected dilatation of the endometrial complex.      5.  Cystic structure in the right adnexa measuring 5.5 cm which may represent right ovarian cyst.           Assessment/Plan  * Sepsis (HCC)- (present on admission)  Assessment & Plan  This is Sepsis Present on admission  SIRS criteria identified on my evaluation include: Tachycardia, with heart rate greater than 90 BPM and Leukocytosis, with WBC greater than 12,000  Source is urinary tract infection  Sepsis protocol initiated  Fluid resuscitation ordered per  protocol  IV antibiotics as appropriate for source of sepsis  While organ dysfunction may be noted elsewhere in this problem list or in the chart, degree of organ dysfunction does not meet CMS criteria for severe sepsis    Leukocytosis  Assessment & Plan  Known UTI, now with added constipation  Continue current antibiotics.      Constipation  Assessment & Plan  Ambulate, bowel protocol      CAROLIN (acute kidney injury) (HCC)- (present on admission)  Assessment & Plan  Prerenal secondary to reduced intravascular volume secondary to sepsis.    Intravenous fluids  Avoid nephrotoxins, monitor inputs and outputs      Hyponatremia- (present on admission)  Assessment & Plan  Hypovolemic hyponatremia   Na has improved with IVF       Hyperkalemia- (present on admission)  Assessment & Plan  Likely secondary to acute kidney injury/severe dehydration.  Continuous cardiac monitoring, anticipate improvement with rehydration  Consider dextrose/insulin, bicarb according to trend    Acute cystitis without hematuria- (present on admission)  Assessment & Plan  Continue ceftriaxone   Continue for now follow-up cultures and sensitivities    Seasonal allergic rhinitis- (present on admission)  Assessment & Plan  Resume home sertraline       VTE prophylaxis: enoxaparin ppx    I have performed a physical exam and reviewed and updated ROS and Plan today (7/14/2021). In review of yesterday's note (7/13/2021), there are no changes except as documented above.

## 2021-07-14 NOTE — DISCHARGE PLANNING
Anticipated Discharge Disposition: [facility]     Action: LSW informed by Dr. Gordon that pt will need to be transferred to [facility].   LSW messaged transfer center [facility] to inform that LSW following pt. LSW off at 1600 and will leave handoff with Socorro MORENO.     Barriers to Discharge: None     Plan: LSW to assist as needed     Addendum 1536  LSW informed that pt going to room S624/01.     Addendum 1544  LSW faxed PCS form to Na with the transfer center.   LSW to leave handoff for Socorro MORENO.

## 2021-07-14 NOTE — PROGRESS NOTES
Monitor Summary     Rhythm: SR-ST   Measurements: 0.14/0.10/0.32  ECTOPIES: oPVC, rPAC        Normal Values  Rhythm SR  HR Range    Measurements 0.12-0.20 / 0.06-0.10  / 0.30-0.52

## 2021-07-14 NOTE — DISCHARGE PLANNING
Anticipated Discharge Disposition: Transfer    Action: Transfer [facility] to [facility] via [facility]. PCS completed and faxed. RALPH compledted Dr Gordon to Dr Art. Pt concerned Car consulted advised this is just hospitalist to hospitalist. [facility]  [phone] for report.    Barriers to Discharge: [facility]     Plan: Will cont to follow for transfer 17:54 ETA 19:00 [facility]

## 2021-07-14 NOTE — ASSESSMENT & PLAN NOTE
Dysfunctional uterine bleeding since May with intermittent pain and night sweats  Now with leukocytosis and abdominal pain without obvious infection.

## 2021-07-14 NOTE — CARE PLAN
The patient is Stable - Low risk of patient condition declining or worsening    Shift Goals  Clinical Goals: Decrease in pain, rest, 10x10  Patient Goals: imrprovement of infection    Progress made toward(s) clinical / shift goals:    Problem: Knowledge Deficit - Standard  Goal: Patient and family/care givers will demonstrate understanding of plan of care, disease process/condition, diagnostic tests and medications  Outcome: Progressing   Discussed POC with pt and started discharge planning. Pt verbalizes understanding.     Patient is not progressing towards the following goals:      Problem: Pain - Standard  Goal: Alleviation of pain or a reduction in pain to the patient’s comfort goal  Outcome: Not Progressing  Pt reports to be in 7/10 pain but declines medication. Pt states that she has a sensitivity to many medications and does not want to risk it. Pt educated, but pt still refused. Non-pharmacological measures in place: Rest and repositioning.

## 2021-07-14 NOTE — DISCHARGE SUMMARY
"Discharge Summary    CHIEF COMPLAINT ON ADMISSION  Chief Complaint   Patient presents with   • Abdominal Pain     Mid and low  Seen here at [facility] 5 d ago and Dx UTI Unable to swallow her oral ABX due to \" strong gag reflex \"       Reason for Admission  abdominal pain,      CODE STATUS  Full Code    HPI & HOSPITAL COURSE  Madelyn Montes is a 71 y.o. female with generalized weakness fevers chills, suprapubic pain dysuria and frequency.  Apparently patient was evaluated 5 days prior and was prescribed Augmentin for a urinary tract infection however she was not able to take those antibiotics due to the size of the pill which was inducing nausea and she felt she had difficulty with swallowing.  She felt her generalized weakness fevers chills and abdominal pain worsened.  She reports that the pain is in suprapubic region no radiation to other places no relieving or aggravating factors.  WBC was elevated at 25.3 and lactic acid 3.7.  Initially there was concern of UTI causing sepsis but without improvement in her symptoms with rocephin and WBC rising again, discussion of her CT results was held with Dr Dejesus with concern of pleural effusion, ascites and uterine mass.  He recommends tapping of the pleural effusion and possibly her ascites for cytology and culture and following this expanding to broader antibiotic coverage pending results of cytology.  Once transferred to [facility], he will be available for consultation on 7/15.  Patient is agreeable to transfer for specialty consultation.          Therefore, she is discharged in fair and stable condition to a short-term general hosptial for inpatient care.    The patient met 2-midnight criteria for an inpatient stay at the time of discharge.      FOLLOW UP ITEMS POST DISCHARGE  John Dejesus to consult 7/15    DISCHARGE DIAGNOSES  Principal Problem:    Sepsis (HCC) POA: Yes  Active Problems:    Seasonal allergic rhinitis POA: Yes    Acute cystitis without hematuria POA: Yes   "  Hyperkalemia POA: Yes    Hyponatremia POA: Yes    CAROLIN (acute kidney injury) (HCC) POA: Yes    Constipation POA: Unknown    Leukocytosis POA: Unknown  Resolved Problems:    * No resolved hospital problems. *      FOLLOW UP  No future appointments.  No follow-up provider specified.    MEDICATIONS ON DISCHARGE     Medication List      ASK your doctor about these medications      Instructions   amoxicillin-clavulanate 875-125 MG Tabs  Commonly known as: AUGMENTIN   Take 1 tablet by mouth 2 times a day for 10 days.  Dose: 1 tablet     artificial tears Oint ophth ointment  Commonly known as: EYE LUBRICANT   Apply 1 Application to both eyes as needed.  Dose: 1 Application     cetirizine 10 MG Tabs  Commonly known as: ZYRTEC   Take 10 mg by mouth every day.  Dose: 10 mg            Allergies  Allergies   Allergen Reactions   • Soy Swelling     Swelling along face left   • Aloe Vera Swelling     Per pt reports that where it was applied she swelled up    • Pcn [Penicillins] Rash     HAS TAKEN AMOXICILLIN BEFORE   • Shellfish Allergy      Made stomach upset, pt states she felt that she needed an antihistamine the last time she had shellfish     • Sulfa Drugs Nausea and Anxiety       DIET  Orders Placed This Encounter   Procedures   • Diet Order Diet: Regular     Standing Status:   Standing     Number of Occurrences:   1     Order Specific Question:   Diet:     Answer:   Regular [1]       ACTIVITY  As tolerated.  Weight bearing as tolerated    LINES, DRAINS, AND WOUNDS  This is an automated list. Peripheral IVs will be removed prior to discharge.  Peripheral IV 07/12/21 20 G Left Antecubital (Active)   Site Assessment Clean;Dry;Intact 07/14/21 0900   Dressing Type Occlusive;Transparent 07/14/21 0900   Line Status Infusing 07/14/21 0900   Dressing Status Clean;Dry;Intact 07/14/21 0900   Dressing Intervention N/A 07/14/21 0900   Date Primary Tubing Changed 07/12/21 07/13/21 1933   NEXT Primary Tubing Change  07/13/21 07/13/21  1933   Infiltration Grading ([facility]) 0 07/14/21 0900   Phlebitis Scale ([facility] 0 07/14/21 0900       Wound 07/12/21 Buttocks (Active)   Wound Image    07/12/21 2250   Site Assessment Pink;Red;Excoriated 07/14/21 0730   Periwound Assessment Clean;Dry;Intact 07/14/21 0730   Margins Attached edges 07/14/21 0730   Closure Open to air 07/13/21 1933   Drainage Amount None 07/13/21 1933   Wound Cleansing Soap and Water 07/14/21 0730   Periwound Protectant Barrier Paste 07/14/21 0730   Dressing Status Open to Air 07/13/21 1933       Peripheral IV 07/12/21 20 G Left Antecubital (Active)   Site Assessment Clean;Dry;Intact 07/14/21 0900   Dressing Type Occlusive;Transparent 07/14/21 0900   Line Status Infusing 07/14/21 0900   Dressing Status Clean;Dry;Intact 07/14/21 0900   Dressing Intervention N/A 07/14/21 0900   Date Primary Tubing Changed 07/12/21 07/13/21 1933   NEXT Primary Tubing Change  07/13/21 07/13/21 1933   Infiltration Grading ([facility]) 0 07/14/21 0900   Phlebitis Scale ([facility]) 0 07/14/21 0900               MENTAL STATUS ON TRANSFER             CONSULTATIONS  none    PROCEDURES  none    LABORATORY  Lab Results   Component Value Date    SODIUM 128 (L) 07/14/2021    POTASSIUM 4.9 07/14/2021    CHLORIDE 97 07/14/2021    CO2 16 (L) 07/14/2021    GLUCOSE 124 (H) 07/14/2021    BUN 57 (H) 07/14/2021    CREATININE 1.34 07/14/2021        Lab Results   Component Value Date    WBC 24.5 (H) 07/14/2021    HEMOGLOBIN 14.0 07/14/2021    HEMATOCRIT 42.4 07/14/2021    PLATELETCT 493 (H) 07/14/2021        Total time of the discharge process exceeds 35 minutes.

## 2021-07-14 NOTE — PROGRESS NOTES
Report received from CAMELIA Reynoso. Assumed care of patient. Discussed pending transfer to [facility]. No questions or concerns at this time.

## 2021-07-14 NOTE — ASSESSMENT & PLAN NOTE
UTI symptoms but culture negative  Concern leukocytosis is secondary to leukomoid reaction to possible malignancy vs undertreated infection  Once thoracentesis completed - broaden antibiotics (vanco/unasyn)  Continue current antibiotics.     DISPLAY PLAN FREE TEXT

## 2021-07-15 PROBLEM — J96.01 ACUTE RESPIRATORY FAILURE WITH HYPOXIA (HCC): Status: ACTIVE | Noted: 2021-01-01

## 2021-07-15 PROBLEM — D75.839 THROMBOCYTOSIS: Status: ACTIVE | Noted: 2021-01-01

## 2021-07-15 PROBLEM — N30.00 ACUTE CYSTITIS WITHOUT HEMATURIA: Status: RESOLVED | Noted: 2021-01-01 | Resolved: 2021-01-01

## 2021-07-15 PROBLEM — E88.09 HYPOALBUMINEMIA: Status: ACTIVE | Noted: 2021-01-01

## 2021-07-15 PROBLEM — Z86.19 HISTORY OF SAN JOAQUIN VALLEY FEVER: Status: ACTIVE | Noted: 2021-01-01

## 2021-07-15 PROBLEM — J90 PLEURAL EFFUSION: Status: ACTIVE | Noted: 2021-01-01

## 2021-07-15 PROBLEM — E87.5 HYPERKALEMIA: Status: RESOLVED | Noted: 2021-01-01 | Resolved: 2021-01-01

## 2021-07-15 PROBLEM — K59.00 CONSTIPATION: Status: RESOLVED | Noted: 2021-01-01 | Resolved: 2021-01-01

## 2021-07-15 PROBLEM — N95.0 POST-MENOPAUSAL BLEEDING: Status: ACTIVE | Noted: 2021-01-01

## 2021-07-15 NOTE — PROGRESS NOTES
Received report from NOC RN and assumed care of pt. Pt is A&Ox4 resting in bed on 2L, nasal cannula switched to oxymask per patient request. Pt reports no pain or discomfort. POC discussed with pt; all questions answered. Pt ambulating up to bathroom with handheld assist. No other needs at this time. Bed locked in lowest position, call light within reach, bed alarm on.

## 2021-07-15 NOTE — DIETARY
Nutrition services: Day 1 of admit.  Madelyn Montes is a 71 y.o. female with admitting DX of uterine mass.   Consult received per nutrition admit screen (filled out at [facility]); unsure weight hx and poor appetite. Reported UBW = 91.6 kg / 202 lb.     Visited pt at bedside, pt appears adequately nourished. Pt states that reduced appetite began ~6 weeks ago. Pt estimates that she has been consuming ~25% of her baseline, and states she typically has a very good appetite. Pt reports feeling abdominal distention/pressure post-prandially. Has transitioned to small/frequent meals, however reports ongoing postprandial discomfort and poor intake. Pt states that she has not lost any weight, however feels that she is retaining fluid on abdomen that is likely masking any weight loss. Pt does admit to feeling significantly weaker over the past 6 weeks.    Of note, pt reports a severe soy allergy to RD. Verified that it is listed in Epic and breakfast tray delivered at time of RD visit had soy listed as allergen. Pt vague in reaction to soy, however states that it can be life threatening in large quantities. She avoids all soy.     Assessment:  Weight: 97 kg per bed scale 7/14  Height: 170.2 cm per stated on 7/12   BMI: 33.5 kg/m^2; BMI classification: obese, class I  Diet/Intake: Regular; no PO recorded since transfer to [facility], however poor PO < 50% x 1 day while at [facility]    Evaluation:   1. Transferred from [facility] to consult with Dr. Dejesus with GYN/ONC d/t uterine mass  2. Weight stable, however pt noted with ascites, pleural effusion on CT - likely masking weight loss  3. MAR: rocephin, bowel meds  4. Labs: Na 134, glucose 138, BUN 57    Malnutrition Risk: Pt with severe reduced energy intake, consuming < 50% of estimated needs for > 1 month per pt report, however does not meet additional criteria at this time.     Recommendations/Plan:   1. Nutrition supplements contain soy - not an option for pt   2. 6 small meals to improve intake in  setting of ascites  3. Encourage intake of meals/snacks  4. Document intake of all meals/snacks as % taken in ADL's to provide interdisciplinary communication across all shifts.   5. Monitor weight.  6. Nutrition rep will continue to see patient for ongoing meal and snack preferences.   7. RD to monitor PO intake, wt trends, and nutrition labs/meds    RD to follow

## 2021-07-15 NOTE — PROGRESS NOTES
Pt returned to unit from procedure.     Pt educated about importance of furosemide. Pt refusing at this time. Pt states she will start taking it tomorrow.

## 2021-07-15 NOTE — PROGRESS NOTES
Pt arrived on Sandra 6 via [facility]. Pt ambulated to bed with standby assist.     Pt is A&Ox4, on 3L oxygen via nasal cannula, VSS.   Assessment completed, POC discussed.   Denies pain or discomfort at this time.    Bed is in lowest, locked position, call bell and belongings are in reach.   Hourly rounding and safety precautions in place.   No further needs at this time.    2226- Pt brought home eye drops. Pt refusing to take Refresh tears currently in MAR. Spoke with pharmacy who recommended getting order from MD to allow pt to take home eye drops.

## 2021-07-15 NOTE — ASSESSMENT & PLAN NOTE
Hypervolemic   Continue to monitor. Avoid salt tabs   Urine/protein/cr ratio low. nephrotic syndrome ruled out. Minimal protein in UA On arrival  Due to malignancy   Comfort care

## 2021-07-15 NOTE — CARE PLAN
Problem: Nutritional:  Goal: Achieve adequate nutritional intake  Description: Patient will consume >50% of meals  Outcome: Not Met   See dietary note. RD following.

## 2021-07-15 NOTE — ASSESSMENT & PLAN NOTE
Appreciate recommendations per Dr. Dejeuss.  Follow-up on pathology from pleural studies  -pt agrees with hospice/comfort care

## 2021-07-15 NOTE — THERAPY
Occupational Therapy   Initial Evaluation     Patient Name: Madelyn Montes  Age:  71 y.o., Sex:  female  Medical Record #: [id number]  Today's Date: 7/15/2021     Precautions  Precautions: Fall Risk    Assessment  Patient is 71 y.o. female admitted for UTI and abdominal pain. CT of the abdomen/pelvis was done and showed enlargement of the uterus with partially calcified mass, concern for possible GYN malignancy. Pt has PMHx of recent hospitalization for similar complaints, obesity, [place] fever, and bilateral retinal disorders. Pt presents to OT eval appearing to be functioning close to baseline for ADLs. Pt able to complete toilet transfer with spv, toileting with spv, standing grooming/hygiene with spv, and LB dressing with maxA, however states she normally wears slip on shoes at home instead of socks. Pt lives alone but has a boyfriend that lives nearby that is able to assist with driving, grocery shopping, and errands. She has a shower chair she can use for safety with showering at home. Patient will not be actively followed for occupational therapy services at this time, however may be seen if requested by physician for 1 more visit within 30 days to address any discharge or equipment needs.     Plan    Recommend Occupational Therapy for Evaluation only.    DC Equipment Recommendations: None  Discharge Recommendations: Anticipate that the patient will have no further occupational therapy needs after discharge from the hospital. Defer to PT recs as pt has flight of stairs.     Subjective    Pt alert, pleasant, and cooperative. No c/o pain during session.     Objective       07/15/21 1118   Prior Living Situation   Prior Services Home-Independent   Housing / Facility 2 Story Apartment / Condo   Steps Into Home   (FOS)   Steps In Home 0   Elevator No   Bathroom Set up Bathtub / Shower Combination;Shower Chair   Equipment Owned Tub / Shower Seat   Lives with - Patient's Self Care Capacity Alone and Able to  Care For Self   Comments Pt lives alone. She has a boyfriend that lives nearby that is able to assist with groceries/errands, however states he can't be there at all times because they don't get along well living together.   Prior Level of ADL Function   Self Feeding Independent   Grooming / Hygiene Independent   Bathing Independent   Dressing Independent   Toileting Independent   Prior Level of IADL Function   Medication Management Independent   Laundry Independent   Kitchen Mobility Independent   Finances Independent   Home Management Independent   Shopping Requires Assist   Prior Level Of Mobility Independent Without Device in Community;Independent Without Device in Home   Driving / Transportation Driving Independent   Occupation (Pre-Hospital Vocational) Retired Due To Age   Leisure Interests Unable To Determine At This Time   Cognition    Cognition / Consciousness WDL   Comments pleasant, cooperative, perseverative on not wanting to have procedure done today   Balance Assessment   Comments standing with no AD, no overt LOB   Bed Mobility    Supine to Sit Minimal Assist   Comments HHA for leverage, but appears capable to complete bed mobility without assist   ADL Assessment   Grooming Supervision;Standing  (washing hands at sink)   Lower Body Dressing Maximal Assist  (socks)   Toileting Supervision   Comments reports she normally wears slip on shoes at home   Functional Mobility   Sit to Stand Supervised   Toilet Transfers Supervised   Transfer Method Stand Step   Mobility in room/bathroom with no AD   Visual Perception   Visual Perception  X   Comments 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

## 2021-07-15 NOTE — NON-PROVIDER
Reason for consultation: uterine and adnexal mass, suspicious for malignancy      Dear Colleagues thank you again for the opportunity for allowing me to participate in the care of Ms. Montes.     HPI: Mrs. Montes  is a very pleasant 71 year old post menopausal  white female who's LMP was in 2010. She has a past medical history of dysmenorrhea, menorrhalgia, and obesity. She was in her usual state of health until 5/21 when she experienced abdominal pain and uterine bleeding.She reports that the uterine bleeding has been happening intermittently since she became menopausal, but has come about intermittently with the pelvic pain she has been experiencing since May, which has sometimes been accompanied by nausea and vomiting. She reports that the bleeding lasts a couple of days and varies from light spotting to passing large clots.    The pain became more severe to which she presented to the ER on 7/7 and was sent home with augmentin for treatment of a possible UTI. She later came back to the ER on 7/12 with tachycardia and a WBC count of 25.3 and was admitted to the hospital, but blood cultures and urine cultures were negative. She underwent a pelvic US which revealed a heterogenous hypoechoic 2.4 x 1.6 x 1.6 cm mass in the uterus, which obscured the measurement of the endometrial stripe. Her CT scan revealed bilateral pleural effusion, small volume ascites and a cystic structure in the right adnexa measuring 5.5 cm and a R ovarian cyst measuring 4 cm. She also underwent a thoracentesis which is pending cytology results.       ROS: 14 point review of system is unremarkable with the exception of what is stated above.    Allergies: Latex rash, PCN nausea able to take keflex without problem, tape rash    Medication: Lasix 40 mg, Lovenox 40 mg, Oxycodone 5 mg, Dilaudid 0.25mg, Tylenol 650 mg, Zofran 4 mg, Augmentin 125 mg    Past medical history: Dysmenorrhea, menorrhalgia, post menopausal bleeding, obesity, Men[place] Juacin  [place] fever    Past surgical history: ***    Social History: ***    Ob/Gyn History: [place]arch at age 14, Menopause at age 61, OCP use from teens to 20s, no history of HRT. Abnormal uterine bleeding since menopause    Family history: Breat cancer- mother    VSS afebrile    Skin: Warm    General: WDWN female in NAD    HEENT: NC/AT sclera no icterus, no adenopathy. Buccal mucosa is normal  Neck: Supple No thyroid mass is noted, no adenopathy.  Heart: RRR, normal S1 and S2 no murmur    Lung: Clear  Back: normal  Abdomen: Soft slightly distended. Mobile cystic mass is appreciated to the level of umbilicus, no ascites or omental caking.    Pelvic: BUS WNL.Limited to bimanual exam. No CDS nodularity. Palpable adnexal mass. Uterus difficult to differentiate.  Ext: No C/CE Nontender.  Neurology: CN II-XII frossly intact.     Imp:   1) Uterine and adnexal mass suspicious of malignancy - Patient has been experiencing post menopausal bleeding, abdominal pain, elevated WBC count and CT scan revealed a partially calcified 5.6 cm mass on uterine fundus was found on CT with 5.5 cm cystic structure in R adnexa and 4 cm ovarian cyst in R ovary. Recommend surgical removal and possible staging. Will await the result of her repeat blood culture to R/O sepsis  2) Leukocytosis - WBC count was 25 on admission and 27 today. Possibly from malignancy or infection. Blood cx on 7/12 negative. Await repeat blood culture  3) Pleural effusion - awaiting cytology        Recommendation: Surgical removal of the uterine and adnexal mass. Recommend robotic hysterectomy, BSO, possible sentinal lymph node sampling and possible surgical staging.  Thank you again for the opportunity for allowing me to participate in her care, if you should have any questions or if I could be of any help to you in the future, please do not hesitate to call.      Sincerely yours,      John Dejesus M.D.

## 2021-07-15 NOTE — CARE PLAN
The patient is Stable - Low risk of patient condition declining or worsening    Shift Goals  Clinical Goals: Pt will tolerate oxygen titration    Progress made toward(s) clinical / shift goals:  Assess patient's respiratory status. Monitor oxygen saturation with . Titrate oxygen as patient tolerates.     Patient is not progressing towards the following goals:

## 2021-07-15 NOTE — PROGRESS NOTES
Report called to CAMELIA Cid at [facility]. POC and Hx discussed. Awaiting transfer.

## 2021-07-15 NOTE — PROGRESS NOTES
Hospital Medicine Daily Progress Note    Date of Service  7/15/2021    Chief Complaint  Madelyn Montes is a 71 y.o. female admitted 7/14/2021 for UTI, abdominal pain    Hospital Course  71-year-old female past medical history of obesity, [place] fever, of retinal disorders, presented 7/12/2021 with generalized weakness fever, chills, suprapubic pain, dysuria, increased frequency at [facility]. Patient was discharged from ED on 7/7 on Augmentin for possible urinary tract infection. She was not able to swallow the antibiotic therefore she presented again back to the emergency room at 7/12/2021. On arrival she was septic, white count 25.3, tachycardic. She was started on ceftriaxone with no improvement. CT of the abdomen/pelvis was done and showed enlargement of the uterus with exophytic partially calcified mass, moderate pleural fluids, Small volume of abdominal and moderate volume of pelvic ascites. GYN/oncology Dr. Dejesus was consulted for further evaluation concern for possible GYN malignancy since patient not improving on IV antibiotic. He did advise patient to be transferred for higher level of care at [facility] and further evaluation. Appreciate his advice. Previous urine culture 7/7 negative. Blood cultures 7/12/2021 negative.      Interval Problem Update  She is not feeling well. She is volume overloaded. Shortness of breath. Thoracentesis ordered in place. Discussed case with Dr. Dejesus I appreciate his advice.    I have personally seen and examined the patient at bedside. I discussed the plan of care with patient, bedside RN, charge RN, , pharmacy and GYN/Onc-Dr. Dejesus.    Consultants/Specialty  GYN/Onc    Code Status  Full Code    Disposition  Patient is not medically cleared.   Anticipate discharge to to home with close outpatient follow-up.  I have placed the appropriate orders for post-discharge needs.    Review of Systems  Review of Systems   Constitutional: Positive  for malaise/fatigue. Negative for chills and fever.   HENT: Negative for congestion and sore throat.    Respiratory: Positive for shortness of breath. Negative for cough, sputum production and wheezing.    Cardiovascular: Positive for leg swelling. Negative for chest pain.   Gastrointestinal: Negative for abdominal pain, blood in stool, constipation, diarrhea, heartburn, nausea and vomiting.   Genitourinary: Negative for dysuria, frequency, hematuria and urgency.   Musculoskeletal: Negative for back pain.   Neurological: Negative for tingling and headaches.   Psychiatric/Behavioral: The patient is nervous/anxious.         Physical Exam  Temp:  [36.1 °C (97 °F)-36.8 °C (98.2 °F)] 36.8 °C (98.2 °F)  Pulse:  [] 122  Resp:  [17-20] 17  BP: (129-157)/(68-87) 156/76  SpO2:  [90 %-94 %] 92 %    Physical Exam  Vitals and nursing note reviewed.   Constitutional:       General: She is in acute distress.      Appearance: She is obese. She is ill-appearing.   HENT:      Head: Normocephalic and atraumatic.      Right Ear: External ear normal.      Left Ear: External ear normal.   Eyes:      General: No scleral icterus.  Cardiovascular:      Rate and Rhythm: Normal rate.      Heart sounds: No murmur heard.     Pulmonary:      Breath sounds: No wheezing or rales.      Comments: Shortness of breath, diminished on bases   Abdominal:      General: There is distension.      Palpations: Abdomen is soft.      Tenderness: There is no abdominal tenderness. There is no guarding.   Musculoskeletal:      Right lower leg: Edema present.      Left lower leg: Edema present.   Skin:     Coloration: Skin is not jaundiced.   Neurological:      Mental Status: She is alert and oriented to person, place, and time.   Psychiatric:         Mood and Affect: Mood normal.         Behavior: Behavior normal.         Thought Content: Thought content normal.         Judgment: Judgment normal.         Fluids    Intake/Output Summary (Last 24 hours) at  "7/15/2021 1233  Last data filed at 7/15/2021 0943  Gross per 24 hour   Intake 531 ml   Output --   Net 531 ml       Laboratory  Recent Labs     07/13/21  0154 07/14/21  0421 07/15/21  0519   WBC 21.1* 24.5* 27.2*   RBC 4.87 4.87 4.98   HEMOGLOBIN 14.3 14.0 14.3   HEMATOCRIT 41.6 42.4 43.9   MCV 85.4 87.1 88.2   MCH 29.4 28.7 28.7   MCHC 34.4 33.0* 32.6*   RDW 39.8 41.4 43.8   PLATELETCT 509* 493* 489*   MPV 9.2 9.3 9.3     Recent Labs     07/13/21  1013 07/14/21  0421 07/15/21  0519   SODIUM 127* 128* 134*   POTASSIUM 5.2 4.9 5.1   CHLORIDE 93* 97 101   CO2 17* 16* 15*   GLUCOSE 128* 124* 138*   BUN 56* 57* 57*   CREATININE 1.50* 1.34 1.30   CALCIUM 8.4 8.4 9.0     Recent Labs     07/12/21  1830   INR 1.38*               Imaging  US-THORACENTESIS PUNCTURE RIGHT    (Results Pending)   DX-CHEST-2 VIEWS    (Results Pending)   EC-ECHOCARDIOGRAM COMPLETE W/ CONT    (Results Pending)        Assessment/Plan  Acute respiratory failure with hypoxia (HCC)- (present on admission)  Assessment & Plan  Factor at this point.  IV fluid versus infection on arrival versus malignancy versus PE versus [place] fever  Echocardiogram to follow  Lasix IV 40 mg daily  Consider CT PE if D-dimer is positive  Thoracentesis.  Follow-up on results    History of [place] fever- (present on admission)  Assessment & Plan  Consider CT chest      Post-menopausal bleeding- (present on admission)  Assessment & Plan  Never normal.  CT abdomen/pelvis as per above not normal.  Appreciate recommendations per Dr. Pedro.    Hypoalbuminemia- (present on admission)  Assessment & Plan  Malnutrition/versus malignancy.  Close monitoring.    Thrombocytosis (HCC)- (present on admission)  Assessment & Plan  Reactive.  Continue to monitor.  canNot rule out infection/malignancy/PE    Pleural effusion- (present on admission)  Assessment & Plan  Thoracentesis ordered.  Echo cardiogram to follow  BNP to follow  Repeat chest x-ray 7/15  Lasix 40 mg " IV    Uterine mass- (present on admission)  Assessment & Plan  Appreciate recommendations per Dr. Dejesus.  Follow-up on pathology from pleural studies    Leukocytosis- (present on admission)  Assessment & Plan  Hold on antibiotic/ no improvement   Blood cultures 7/12 -  Urine culture 7/7 -  If clinical condition worsen consider meropenem and vancomycin.  Start repeat cultures.  Consider also adding Diflucan  Can also rule out PE/malignancy    CAROLIN (acute kidney injury) (HCC)- (present on admission)  Assessment & Plan  7/15- slight improvement.   Prerenal/cardiorenal ?? Pt volume overload. No recent contrast exposure   Continue to monitor     Hyponatremia- (present on admission)  Assessment & Plan  Hypervolemic   Continue to monitor. Avoid salt tabs   Urine/protein/cr ratio to follow. Rule out nephrotic syndrome. Minimal protein in UA On arrival. Not likely the cause     Sepsis (HCC)- (present on admission)  Assessment & Plan  This is Sepsis Present on admission  SIRS criteria identified on my evaluation include: Tachycardia, with heart rate greater than 90 BPM and Leukocytosis, with WBC greater than 12,000  Source is UTI  Sepsis protocol initiated  Fluid resuscitation ordered per protocol  IV antibiotics as appropriate for source of sepsis  While organ dysfunction may be noted elsewhere in this problem list or in the chart, degree of organ dysfunction does not meet CMS criteria for severe sepsis  7/15-pt was admitted for sepsis. Subjective fever at home. And above criteria. Source possible UTI. + urinary symptoms.  However patient not improving on antibiotic.  Blood cultures/urine cultures negative.  Discontinue antibiotic.  Continue to monitor this point.  Patient started to become hypotensive, fever start meropenem (rash with penicillin, however worth to challenge it) and vancomycin.  Also patient has history [facility] fever. Consider to add diflucan. Also other explanation for sepsis picture is PE/ [facility]ri  [facility] fever/malignancy . + pleural fluid on abdominal/pelvis CT  Lactic acid elevated on arrival   D-dimer/procal to follow   Consider CT/PE if d-dimer positive. GFR improving   Stop IVF. BP stable. Lasix 40 mg IV   Echocardiogram to follow              VTE prophylaxis: enoxaparin ppx    I have performed a physical exam and reviewed and updated ROS and Plan today (7/15/2021). In review of yesterday's note (7/14/2021), there are no changes except as documented above.

## 2021-07-15 NOTE — ASSESSMENT & PLAN NOTE
Thoracentesis ordered.  Echo cardiogram unremarkable   BNP to follow  Due to malignancy   Continue lasix for comfort

## 2021-07-15 NOTE — ASSESSMENT & PLAN NOTE
Never normal.  CT abdomen/pelvis as per above not normal.  Appreciate recommendations per Dr. Dejesus,   Dr. Dejesus is very confident that pt has uterus cancer and her prognosis is poor  Pt does not want further treatment

## 2021-07-15 NOTE — PROGRESS NOTES
Dr. Phillips consented patient and performed a right thoracentesis  in U/S room 2  .  Vitals monitored during procedure and documented in EPIC.   1200 ml of fluid removed,   1000  ml of fluid sent to lab .    Patient tolerated procedure well.. Report given to CAMELIA Keith. Transport returned pt to her room

## 2021-07-15 NOTE — THERAPY
Contact Note    Patient Name: Madelyn Montes  Age:  71 y.o., Sex:  female  Medical Record #: [id number]  Today's Date: 7/15/2021    attempted x2 (off floor for procedure in AM); declines 2' to awaiting results/POC per pt in PM; will re-attempt (pt is quite particular and requests attempt b/w 10-noon 7/16 as able)

## 2021-07-15 NOTE — ASSESSMENT & PLAN NOTE
Hold on antibiotic/ no improvement   Blood cultures 7/12 -  Urine culture 7/7 -  Most likely related to PE/malignancy   Comfort care

## 2021-07-15 NOTE — HOSPITAL COURSE
71-year-old female past medical history of obesity, [place] valley fever, of retinal disorders, presented 7/12/2021 with generalized weakness fever, chills, suprapubic pain, dysuria, increased frequency at [facility]. Patient was discharged from ED on 7/7 on Augmentin for possible urinary tract infection. She was not able to swallow the antibiotic therefore she presented again back to the emergency room at 7/12/2021. On arrival she was septic, white count 25.3, tachycardic. She was started on ceftriaxone with no improvement. CT of the abdomen/pelvis was done and showed enlargement of the uterus with exophytic partially calcified mass, moderate pleural fluids, Small volume of abdominal and moderate volume of pelvic ascites. GYN/oncology Dr. Dejesus was consulted for further evaluation concern for possible GYN malignancy since patient not improving on IV antibiotic. He did advise patient to be transferred for higher level of care at [facility] and further evaluation. Appreciate his advice. Previous urine culture 7/7 negative. Blood cultures 7/12/2021 negative.  Antibiotic were discontinued.  She was scheduled for surgery, biopsy 7/17. patient got paranoid/very agitated.  Surgery was canceled.  Initially patient request to leave AMA, however she was actively psychotic from agitation and anxiety.  She was placed on legal hold which was discontinued the following day and she was able make reasonable decisions.  D-dimer above 20.  CT was not able to get done because of allergy from contrast and patient concern for eye bleeding if steroids were given.  However lower extremity DVTs positive. With that information and expertise from Dr. Dejesus that patient most likely has very advanced cancer. patient agreed for comfort care measurement which were initiated 7/18. Multiple discussion with pt and her friend provided. Lasix were left for pt comfort care since she is very volume overload.

## 2021-07-15 NOTE — PROGRESS NOTES
4 Eyes Skin Assessment Completed by CAMELIA Davis and CAMELIA Rea.    Head WDL  Ears Redness, Blanching   Nose WDL  Mouth WDL  Neck WDL  Breast/Chest WDL  Shoulder Blades WDL  Spine WDL  (R) Arm/Elbow/Hand Bruising  (L) Arm/Elbow/Hand Swelling  Abdomen WDL  Groin WDL  Scrotum/Coccyx/Buttocks Redness, Blanching, Non-Blanching, Excoriation, Discoloration  (R) Leg WDL  (L) Leg WDL  (R) Heel/Foot/Toe Blanching  (L) Heel/Foot/Toe Blanching          Devices In Places Nasal cannula, Oxymask, PIV      Interventions In Place NC W/Ear Foams, Pillows and Barrier Cream    Possible Skin Injury Yes    Pictures Uploaded Into Epic Yes  Wound Consult Placed Yes  RN Wound Prevention Protocol Ordered Yes

## 2021-07-15 NOTE — PROGRESS NOTES
Med rec completed on 7/12/21 upon hospital admission prior to transfer by med rec tech at [facility].     Ed Note by tech:  Med rec complete per pt. Pt was prescribed ABX but was unable to start. Per pt, pills were to big to swallow.

## 2021-07-15 NOTE — ASSESSMENT & PLAN NOTE
This is Sepsis Present on admission  SIRS criteria identified on my evaluation include: Tachycardia, with heart rate greater than 90 BPM and Leukocytosis, with WBC greater than 12,000  Source is UTI  Sepsis protocol initiated  Fluid resuscitation ordered per protocol  IV antibiotics as appropriate for source of sepsis  While organ dysfunction may be noted elsewhere in this problem list or in the chart, degree of organ dysfunction does not meet CMS criteria for severe sepsis  7/15-pt was admitted for sepsis. Subjective fever at home. And above criteria. Source possible UTI. + urinary symptoms.  However patient not improving on antibiotic.  Blood cultures/urine cultures negative.  Discontinue antibiotic.  Continue to monitor this point.  Patient started to become hypotensive, fever start meropenem (rash with penicillin, however worth to challenge it) and vancomycin.  Also patient has history of [place] fever. Consider to add diflucan. Also other explanation for sepsis picture is PE/ [place] fever/malignancy . + pleural fluid on abdominal/pelvis CT  Lactic acid elevated on arrival   7/19- not likely sepsis. All related to malignancy/PE   On comfort care

## 2021-07-15 NOTE — PROGRESS NOTES
Monitor Summary     Rhythm:SR/ST   Measurements: 0.12/0.08/0.32  ECTOPIES: rpvc, opac        Normal Values  Rhythm SR  HR Range    Measurements 0.12-0.20 / 0.06-0.10  / 0.30-0.52

## 2021-07-16 PROBLEM — E66.9 OBESITY (BMI 30-39.9): Status: ACTIVE | Noted: 2021-01-01

## 2021-07-16 NOTE — PROGRESS NOTES
Received call from [place] in lab reporting an error with pt's D dimer. Previous results showed greater than 2, correct value is greater than 20. Dr. Bennett made aware of this error.

## 2021-07-16 NOTE — PROGRESS NOTES
1 RN Skin Assessment completed.   Patient's risk of skin breakdown: High    Devices in place and skin assessed under:   [x] Pulse Ox  [x] PIV [] Central Line [] SCDs []Purewick  [] Jewell  []Condom Cath   [] BMS        []  Cortrak   [x]  Oxymask   [] Bipap    [x] Nasal Canula   [] N/A   [] Other(specify):     Right Ear  [] WDL                or           [x] Skin issue present (please provide assessment/description below)    Blanchable erythema.    Left Ear  [] WDL                or           [x] Skin issue present (please provide assessment/description below)    Blanchable erythema.     Right Elbow:  [x] WDL               or           [] Skin issue present (please provide assessment/description below)    Left Elbow:   [x] WDL                or           [] Skin issue present (please provide assessment/description below)    Coccyx/Buttocks:  [] WDL                or           [x] Skin issue present (please provide assessment/description below)    Non blanching erythema, excoriation, discoloration, multiple open areas.     Right Heel/Foot/Toes:  [] WDL                or           [x] Skin issue present (please provide assessment/description below)    Left Heel/Foot/Toes:   [] WDL              or           [x] Skin issue present (please provide assessment/description below)      **Describe any other skin issues in areas not already listed from above list:   [] N/A    Profuse bruising on right antecubital, generalized bruising.     Interventions In Place: NC W/Ear Foams and Chair Waffle, Pillows, Pressure Redistribution Mattress.     **If any new or digressing skin issues are found, fill out the selection below.    Possible Skin Injury Yes  Pictures Uploaded Into Epic: Yes  Wound Consult Placed: Yes  RN Wound Prevention Protocol Ordered: Yes   What new preventative interventions did you add? N/A

## 2021-07-16 NOTE — CARE PLAN
The patient is Watcher - Medium risk of patient condition declining or worsening    Shift Goals  Clinical Goals: Pt will maintain O2 sats on room air    Progress made toward(s) clinical / shift goals:  Assess respiratory status. Monitor oxygen saturation with . Encourage patient to sit up and take deep breaths, encourage pt to get out of bed.      Patient is not progressing towards the following goals:

## 2021-07-16 NOTE — PROGRESS NOTES
"Received report from day shift RN and assumed care of patient.   Pt is resting in bed, A&Ox4, on RA, VSS.   Assessment completed, POC discussed.   Reports pain in lower right abdomen; prn Tylenol given.  Pt is tearful, expressing concerns about thoracentesis results, biopsy tomorrow, and plan of care moving forward. This RN answered all questions regarding current plan of care with pt.   Bed is in lowest, locked position, call bell and belongings are in reach.   Hourly rounding and safety precautions in place.   No further needs at this time.    0509- Pt refusing medications at this time \"stating no, I cannot take anything. I have a procedure today and that's going to mess everything up\". This RN educated pt on being able to take medications with sips of water. Pt stated she does not want to take medications at this time. This RN asked if she would be okay with receiving IV Lasix this morning, pt stated \"no, it would be better to take this medication later\".   "

## 2021-07-16 NOTE — OR NURSING
Pt arrived to pre op. Pt states she does not want surgery today and wishes to leave AMA. Dr. Dejesus spoke with pt at bedside and attempted to contact hospitalist. Per Dr. Dejesus surgery is cancelled, and to send pt back up to unit. Floor RN notified.

## 2021-07-16 NOTE — DISCHARGE PLANNING
Anticipated Discharge Disposition: Home    Action: Discussed patients needs during IDT rounds.  Patient is very anxious, awaiting the result of her biopsy possible cancer.      Barriers to Discharge: medical clearance, biopsy results    Plan: continue to monitor for discharge barriers

## 2021-07-16 NOTE — PROGRESS NOTES
Patient arrived to preoperative holding area.  Patient stated that she did not want to proceed with the surgery that I had recommended.  I have informed the patient that the purpose of the procedure was to make a diagnosis as we had discussed yesterday.  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  Patient stated that she did not want to proceed.  She wanted to leave AGAINST MEDICAL ADVICE.  Thus we will be canceling my recommended procedure.

## 2021-07-16 NOTE — PROGRESS NOTES
[facility] Medicine Daily Progress Note    Date of Service  7/16/2021    Chief Complaint  Madelyn Montes is a 71 y.o. female admitted 7/14/2021 for UTI, abdominal pain    Hospital Course  71-year-old female past medical history of obesity, [place] valley fever, of retinal disorders, presented 7/12/2021 with generalized weakness fever, chills, suprapubic pain, dysuria, increased frequency at [facility]. Patient was discharged from ED on 7/7 on Augmentin for possible urinary tract infection. She was not able to swallow the antibiotic therefore she presented again back to the emergency room at 7/12/2021. On arrival she was septic, white count 25.3, tachycardic. She was started on ceftriaxone with no improvement. CT of the abdomen/pelvis was done and showed enlargement of the uterus with exophytic partially calcified mass, moderate pleural fluids, Small volume of abdominal and moderate volume of pelvic ascites. GYN/oncology Dr. Dejesus was consulted for further evaluation concern for possible GYN malignancy since patient not improving on IV antibiotic. He did advise patient to be transferred for higher level of care at [facility] and further evaluation. Appreciate his advice. Previous urine culture 7/7 negative. Blood cultures 7/12/2021 negative.      Interval Problem Update  She is not feeling well. She is volume overloaded. Shortness of breath. Thoracentesis ordered in place. Discussed case with Dr. Dejesus I appreciate his advice.  7/16-patient is very emotional today and in tears.  She is worried about the surgery.  Not sure what type of surgery she is getting because consultant note from Dr. Dejesus is pending.  I encourage patient to take Lasix.  She agreed.  She is also worried about valley fever.  I did explain that so far the fungal cultures are no growth.  Continue to monitor.  D-dimmer significantly elevated. CTA chest urgent. Start antibiotic     I have personally seen and examined the patient at  bedside. I discussed the plan of care with patient, bedside RN, charge RN, , pharmacy and GYN/Onc-Dr. Dejesus.    Consultants/Specialty  GYN/Onc    Code Status  Full Code    Disposition  Patient is not medically cleared.   Anticipate discharge to to home with close outpatient follow-up.  I have placed the appropriate orders for post-discharge needs.    Review of Systems  Review of Systems   Constitutional: Positive for malaise/fatigue. Negative for chills and fever.   HENT: Negative for congestion and sore throat.    Respiratory: Positive for cough and shortness of breath. Negative for sputum production and wheezing.    Cardiovascular: Positive for leg swelling. Negative for chest pain.   Gastrointestinal: Negative for abdominal pain, blood in stool, constipation, diarrhea, heartburn, nausea and vomiting.   Genitourinary: Negative for dysuria, frequency, hematuria and urgency.   Musculoskeletal: Negative for back pain.   Neurological: Negative for tingling and headaches.   Psychiatric/Behavioral: The patient is nervous/anxious.         Physical Exam  Temp:  [35.9 °C (96.7 °F)-36.4 °C (97.6 °F)] 36.3 °C (97.4 °F)  Pulse:  [] 103  Resp:  [17-19] 17  BP: (117-127)/(58-73) 117/61  SpO2:  [91 %-99 %] 91 %    Physical Exam  Vitals and nursing note reviewed.   Constitutional:       General: She is in acute distress.      Appearance: She is obese. She is ill-appearing.   HENT:      Head: Normocephalic and atraumatic.      Right Ear: External ear normal.      Left Ear: External ear normal.   Eyes:      General: No scleral icterus.  Cardiovascular:      Rate and Rhythm: Normal rate.      Heart sounds: No murmur heard.     Pulmonary:      Breath sounds: No wheezing or rales.      Comments: Shortness of breath, diminished on bases   Abdominal:      General: There is distension.      Palpations: Abdomen is soft.      Tenderness: There is no abdominal tenderness. There is no guarding.   Musculoskeletal:      Right  lower leg: Edema present.      Left lower leg: Edema present.   Skin:     Coloration: Skin is not jaundiced.   Neurological:      Mental Status: She is alert and oriented to person, place, and time.   Psychiatric:         Mood and Affect: Mood normal.         Behavior: Behavior normal.         Thought Content: Thought content normal.         Judgment: Judgment normal.         Fluids    Intake/Output Summary (Last 24 hours) at 7/16/2021 1322  Last data filed at 7/16/2021 0949  Gross per 24 hour   Intake 230 ml   Output --   Net 230 ml       Laboratory  Recent Labs     07/14/21  0421 07/15/21  0519 07/16/21  0858   WBC 24.5* 27.2* 27.9*   RBC 4.87 4.98 4.79   HEMOGLOBIN 14.0 14.3 13.7   HEMATOCRIT 42.4 43.9 41.8   MCV 87.1 88.2 87.3   MCH 28.7 28.7 28.6   MCHC 33.0* 32.6* 32.8*   RDW 41.4 43.8 43.8   PLATELETCT 493* 489* 411   MPV 9.3 9.3 9.5     Recent Labs     07/14/21  0421 07/15/21  0519 07/16/21  0858   SODIUM 128* 134* 136   POTASSIUM 4.9 5.1 5.1   CHLORIDE 97 101 104   CO2 16* 15* 15*   GLUCOSE 124* 138* 110*   BUN 57* 57* 62*   CREATININE 1.34 1.30 1.19   CALCIUM 8.4 9.0 9.2                   Imaging  EC-ECHOCARDIOGRAM COMPLETE W/O CONT   Final Result      DX-CHEST-2 VIEWS   Final Result      1. Continued moderate left pleural effusion, obscuring the left lung base.   2. Small right pleural effusion.   3. No other acute findings.      US-THORACENTESIS PUNCTURE RIGHT   Final Result      1. Ultrasound guided right sided diagnostic and therapeutic thoracentesis.      2. 1200 mL of fluid withdrawn.      DX-CHEST-PORTABLE (1 VIEW)   Final Result      1. Interval decrease in size of the right pleural effusion after right thoracentesis. No pneumothorax.   2. Continued moderate to large left pleural effusion, obscuring the left lung base.      CT-CTA CHEST PULMONARY ARTERY W/ RECONS    (Results Pending)        Assessment/Plan  Acute respiratory failure with hypoxia (HCC)- (present on admission)  Assessment &  "Plan  Factor at this point.  IV fluid versus infection on arrival versus malignancy versus PE versus central \"valley fever  Echocardiogram to follow  Lasix IV 40 mg daily  Consider CT PE if D-dimer is positive  Thoracentesis.  Follow-up on results    History of [place] fever- (present on admission)  Assessment & Plan  Consider CT chest      Post-menopausal bleeding- (present on admission)  Assessment & Plan  Never normal.  CT abdomen/pelvis as per above not normal.  Appreciate recommendations per Dr. Pedro.    Hypoalbuminemia- (present on admission)  Assessment & Plan  Malnutrition/versus malignancy.  Close monitoring.    Thrombocytosis (HCC)- (present on admission)  Assessment & Plan  Reactive.  Continue to monitor.  canNot rule out infection/malignancy/PE    Pleural effusion- (present on admission)  Assessment & Plan  Thoracentesis ordered.  Echo cardiogram to follow  BNP to follow  Repeat chest x-ray 7/15  Lasix 40 mg IV  7/16- follow up pathology, cultures. Continue antibiotic     Uterine mass- (present on admission)  Assessment & Plan  Appreciate recommendations per Dr. Dejesus.  Follow-up on pathology from pleural studies  7/16- going for surgery/biopsy    Leukocytosis- (present on admission)  Assessment & Plan  Hold on antibiotic/ no improvement   Blood cultures 7/12 -  Urine culture 7/7 -  If clinical condition worsen consider meropenem and vancomycin.  Start repeat cultures.  Consider also adding Diflucan  Can also rule out PE/malignancy  7/16- continue to monitor. Hold on antibiotic. Doing stable without them     CAROLIN (acute kidney injury) (HCC)- (present on admission)  Assessment & Plan  7/15- slight improvement.   Prerenal/cardiorenal ?? Pt volume overload. No recent contrast exposure   Continue to monitor   7/16- continue to monitor. Encourage to take lasix . improving    Hyponatremia- (present on admission)  Assessment & Plan  Hypervolemic   Continue to monitor. Avoid salt tabs   Urine/protein/cr " ratio low. nephrotic syndrome ruled out. Minimal protein in UA On arrival. Not likely the cause     Sepsis (HCC)- (present on admission)  Assessment & Plan  This is Sepsis Present on admission  SIRS criteria identified on my evaluation include: Tachycardia, with heart rate greater than 90 BPM and Leukocytosis, with WBC greater than 12,000  Source is UTI  Sepsis protocol initiated  Fluid resuscitation ordered per protocol  IV antibiotics as appropriate for source of sepsis  While organ dysfunction may be noted elsewhere in this problem list or in the chart, degree of organ dysfunction does not meet CMS criteria for severe sepsis  7/15-pt was admitted for sepsis. Subjective fever at home. And above criteria. Source possible UTI. + urinary symptoms.  However patient not improving on antibiotic.  Blood cultures/urine cultures negative.  Discontinue antibiotic.  Continue to monitor this point.  Patient started to become hypotensive, fever start meropenem (rash with penicillin, however worth to challenge it) and vancomycin.  Also patient has history of [place] fever. Consider to add diflucan. Also other explanation for sepsis picture is PE/ [place] fever/malignancy . + pleural fluid on abdominal/pelvis CT  Lactic acid elevated on arrival   D-dimer/procal to follow   Consider CT/PE if d-dimer positive. GFR improving   Stop IVF. BP stable. Lasix 40 mg IV   Echocardiogram to follow   7/16- breathing better. Continue to monitor. Start antibiotic if signs of sepsis. Consider fluconazole to add . Start ceftriaxone and flagyl              VTE prophylaxis: enoxaparin ppx    I have performed a physical exam and reviewed and updated ROS and Plan today (7/16/2021). In review of yesterday's note (7/15/2021), there are no changes except as documented above.

## 2021-07-16 NOTE — PROGRESS NOTES
Received report from NOC RN and assumed care of pt. Pt is A&Ox4, with some intermittent confusion. Pt is resting in bed on room air. Pt reports no pain or discomfort. POC discussed with pt, including plans for procedure today; all questions answered. No other needs at this time. Bed locked in lowest position, call light within reach.

## 2021-07-16 NOTE — CONSULTS
I have been asked by  to see Mrs. Montes for an evaluation to rule out primary GYN malignancy.    Dear colleagues thank you again for the opportunity for me to participate in Mrs. Montes's care.    History of present illness: Mrs. Montes is a very pleasant 71-year-old G0 female whose last menstrual period was at the age of 61.  Patient had not been on any hormone replacement therapy.  Patient's past medical history is significant for [place] fever, photophobia and retina disorder.  Patient was in her usual state of health until approximately in May 2021 she experienced some abnormal spotting and bleeding.  Previously she has had some spotting and had not been on any hormone replacement therapy.  The spotting was thought to be a normal.  Has spontaneously resolved in the past.  The most recent spotting continue on to the extent that she did not develop suprapubic pain and lower abdominal pain and bloatedness.  Because of the symptoms patient was seen and evaluated in late June.  She was diagnosed with urinary tract infection and was prescribed amoxicillin the patient did not take the medication.  She has now since been readmitted back to [facility] with a diagnosis of untreated UTI.  She was admitted on 7/12/2021 she was noted to have a elevated white blood cell count of 25,000.  She was placed on Rocephin.  She underwent a CT scan which shows an enlarged uterus with an ovarian cyst, a ascites and bilateral pleural effusion.  Despite being on IV antibiotics she has a persistently elevated white blood cell count 25-27K.  However should also be noted that patient has not been septic.  Due to this CT scan finding I was asked to consult on the patient to rule out possible source of her elevated white blood cell count and rule out GYN malignancy.    Patient was seen at her bedside.  Patient denies any fevers and chills.  She does admit to having bloatedness and mildly short of breath since and  she underwent thoracentesis.  She complains about spotting and some discharge.  She still has pelvic pain.  She otherwise has no other symptoms.    14 point review of system is unremarkable with the exception of what is been described on HPI.    Allergies: Penicillin she was informed of this allergy since she was 11.  However she does not recall ever having a reaction of angioedema or bronchospasm on a severe rash when she took penicillin.  Should be noted that patient is currently on cephalosporins and is tolerating the treatment well.  In addition patient claims she has allergy to sulfa drugs however it is nausea.      Medications: Currently she is takes Rocephin.    Past medical history: an [facility] fever, photophobia and retina disorder.     Past surgical history: Cataract surgery    Family history: Unremarkable for ovarian breast uterine or colon cancer    Social history: Patient lives alone.  She has no next of kin.  She does have friends.  She is a .  She denies any history of smoking drug use or alcohol use.    Vital signs: Stable afebrile.    General appearance: Well well-nourished female obese in no obvious acute distress.    HEENT: Normocephalic atraumatic sclerae nonicteric    Neck: Supple no thyroid masses noted no supraclavicular adenopathy appreciated    Heart: Regular rate rhythm normal S1-S2 no murmur no S3-S4    Lungs: Decreased breath sounds bilaterally no wheezes no crackles    Abdomen: Slightly distended questionable omental caking appreciated no obvious evidence of peritonitis or rebound tenderness somewhat firm in the mid abdomen    Pelvic: Limited to bimanual examination on exam 1 can appreciate of the very firm cervix with nodularity highly suspicious for cancer.  Confined to the cervix.  Uterus is approximately 12 weeks in size no cul-de-sac nodularities appreciated    Extremities: No clubbing cyanosis edema nontender    Neurology: Alert awake oriented x3, cranial nerves II to  XII grossly intact no focal findings    Laboratory tests noted white blood cell count 27,000 with 13% bands normal lactic acid level electrolytes unremarkable.    CT scan of the abdomen pelvis reviewed.  There is hepatic fluid and ascites noted.  There is a questionable omental caking appreciated no evidence of retroperitoneal adenopathy.  There is a calcified mass fundus of the uterus clinically consistent with probably a fibroid there is also an ovarian cyst.    Transvaginal ultrasound performed reviewed there appears to be tissue within the endometrial cavity with fluid.    CA-125: Pending    Thoracentesis pleural fluid cytology pending    Impression: 71-year-old female obese presents with postmenopausal bleeding x2 months with suprapubic vein and elevated white blood cell count.  She has had abnormal finding on the CT scan.  I suspect the patient may have underlying endometrial cancer.  I am highly concerned that patient may her tumor that is obstructing the endometrial canal precluding Euflexxa material.  She may have the parametria.  Patient also has pleural effusion status post thoracentesis cytology is pending.  Patient was CTs findings in the pleural fluid and my physical examination I am concerned that she may have advanced age endometrial cancer.  There is no surgical pathological diagnosis at this current time.    I discussed with the patient my findings.  I recommend the patient should at the minimum goal examination anesthesia with biopsies and possible D&C with evacuation of hopefully the parametria and cultures at the time.  Diagnostic laparoscopy should be performed to assess intraperitoneal disease.    Patient was also informed about the potential diagnosis of advanced endometrial cancer.  This will require most likely surgery followed by chemotherapy.  We discussed about prognosis with treatment without treatment.  If she does have advanced disease as confirmed by thoracentesis with positive  cytology for malignancy or at the time of the laparoscopy if the patient has diffuse disease intraperitoneally and she has endometrial cancer most likely this will make her stage IV disease.  Her prognosis is poor.  She has probably an incurable disease.  We discussed the potential for this diagnosis and I discussed with her about treatment and the goals of the treatment.  We discussed about the possibility of hospice.  Patient at this point in time is not clear whether she wants to pursue treatment.  She is amenable to however establishing a surgical pathological evaluation.  Thus patient is amenable to proceeding with a diagnostic laparoscopy, examination anesthesia, cervical biopsy, and a fractional D&C.    2.  Elevated white blood cell count leukocytosis.  Patient is not septic.  The patient has been on Rocephin.  I suspect that her elevated white count persistent is due to possibly a parametria.  At this point in time and review of the antibiotic regiment she is on Rocephin which should adequately cover gram-negative organism and Pseudomonas and Klebsiella possible parametria.  I would also add Flagyl 500 mg IV every 6.    Plan: Patient has been counseled about proceeding with surgery examination esthesia diagnostic laparoscopy, cervical biopsy and a fractional D&C.  This will be performed once 7/16/2021.    2.  N.p.o. after 4 AM    3.  Consent for examination under anesthesia, cervical biopsy, fractional D&C, and a diagnostic laparoscopy with possible biopsy.    Risk benefits and rationale procedures were reviewed with the patient in great detail patient's understanding of these risk wished to proceed with the surgery as planned.

## 2021-07-17 PROBLEM — F41.0 ANXIETY ATTACK: Status: ACTIVE | Noted: 2021-01-01

## 2021-07-17 NOTE — PROGRESS NOTES
On call MD, Dr. ERICA Kemp notified re: pt refusing treatment including new IV.  Pt doesn't even let this RN touch pt.  Notified MD that pt is disoriented to time.  RN asked MD if MD wants to order restraint since pt is refusing.  MD said to just monitor pt for now.  No new order received.

## 2021-07-17 NOTE — PROGRESS NOTES
"Bed alarm went off.  Found pt sitting edge of bed.  Asked pt if she needs to go to bathroom.  Pt said \"no\" first but she refused to lay down.  Asked again then she agreed to go to bathroom.  Pt moaned loudly while she was walking but pt continues to refuse pain med.    "

## 2021-07-17 NOTE — PROGRESS NOTES
Incontinent of urine and stools.  Refused to get clean and linen change.  Cleaned and changed linens with three staffs.  Excoriated area noted.  Barrier paste applied.

## 2021-07-17 NOTE — PROGRESS NOTES
Received report from NOC RN and assumed care of pt. Pt is A&Ox4, confused at time. Pt is resting in bed on 2L O2 via nasal cannula. Pt reports 9/10 lower abdominal pain, pt refusing current pain medication ordered, will follow up with provider. POC discussed with pt; all questions answered. No other needs at this time. Bed locked in lowest position, call light within reach, 1:1 safety sitter at the bedside.

## 2021-07-17 NOTE — CARE PLAN
The patient is Watcher - Medium risk of patient condition declining or worsening    Shift Goals  Clinical Goals: cooperative with care    Progress made toward(s) clinical / shift goals:  explained multiple times for pain medication but pt refused to take any medications    Patient is not progressing towards the following goals: Pt refused to take any medications despite education.      Problem: Pain - Standard  Goal: Alleviation of pain or a reduction in pain to the patient’s comfort goal  Outcome: Not Progressing

## 2021-07-17 NOTE — CARE PLAN
The patient is Watcher - Medium risk of patient condition declining or worsening    Shift Goals  Clinical Goals: pt will comply with plan of care    Progress made toward(s) clinical / shift goals:  Collaborate with pt and interdisciplinary team regarding pt's plan of care.     Patient is not progressing towards the following goals:

## 2021-07-17 NOTE — PROGRESS NOTES
Pt moaning very loudly.  RN went in and offered pain med again but pt continues to refuse to take any medication despite education.  Safety sitter in the room.

## 2021-07-17 NOTE — PROGRESS NOTES
[facility] Medicine Daily Progress Note    Date of Service  7/17/2021    Chief Complaint  Madelyn Montes is a 71 y.o. 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 Patient was discharged from ED on 7/7 on Augmentin for possible urinary tract infection. She was not able to swallow the antibiotic therefore she presented again back to the emergency room at 7/12/2021. On arrival she was septic, white count 25.3, tachycardic. She was started on ceftriaxone with no improvement. CT of the abdomen/pelvis was done and showed enlargement of the uterus with exophytic partially calcified mass, moderate pleural fluids, Small volume of abdominal and moderate volume of pelvic ascites. GYN/oncology Dr. Dejesus was consulted for further evaluation concern for possible GYN malignancy since patient not improving on IV antibiotic. He did advise patient to be transferred for higher level of care at [facility] and further evaluation. Appreciate his advice. Previous urine culture 7/7 negative. Blood cultures 7/12/2021 negative.      Interval Problem Update  She is not feeling well. She is volume overloaded. Shortness of breath. Thoracentesis ordered in place. Discussed case with Dr. Dejesus I appreciate his advice.  7/16-patient is very emotional today and in tears.  She is worried about the surgery.  Not sure what type of surgery she is getting because consultant note from Dr. Dejesus is pending.  I encourage patient to take Lasix.  She agreed.  She is also worried about valley fever.  I did explain that so far the fungal cultures are no growth.  Continue to monitor.  D-dimmer significantly elevated. CTA chest urgent. Start antibiotic     7/17-pt was ready on pre-op.  Then she decided  "against surgery and further tests. She does not want pain meds. She is extreme pain/moaning from abdominal pain, she had read bad things about oxycodone and she does not want to try it. We don't have available codeine. Yesterday, she was talking only on one sentence word \"activate code. 642..AMA.\" she wanted to leave. We asked where and she said home. We asked how she said \"walk\". Extreme agitated. Legal hold initiated. She was not able to communicate a safe plan. She refused the biotic, CT scan, pain meds.    Today in the morning she is able to compensate properly.  She looks in distress.  Still refusing meds as per above reasons.  She refuses IV line.  She tells me the story of how she got diagnosed with [facility] Valley fever.  She is being followed for that at [facility].  Has never been treated.  The antibodies have been positive.  No true symptomatic spread or symptoms.  Possible some calcification on previous chest x-ray per patient.  She reports extra respiratory symptoms as fatigue, arthritis.  She was transferred from [facility] she was hoping a second opinion for that.  That have never been able to be addressed because of the concern of malignancy.  She is allergy from contrast. she does not want steroid premedicated because she is worried of eye bleeds from previous retinal treatment. She has some misconception of how medical team works in the hospital. I did explain clearly that from lab works and current treatment, it is concerning for PE and malignancy. Fungal culture have been negative so far. I understand pt point that if she does all above intervention outcome still very poor if she has cancer. However if she has some infection ?? That is reversible. I did explain to pt that if she feels that her quality of life has been good so far and she does not want anything more invasive that is very reasonable. She asked for more time. Will consult palliative. Continue " supportive treatment, either of them she is refusing. Most likely I will discontinue legal hold.    I spent a total of 44 minutes of  face to face time performing additional research, counseling discussing plan of care/goals of care with patient. It does not include clinical time, procedure time and discussion with medical team rest, other healthcare provider.       I have personally seen and examined the patient at bedside. I discussed the plan of care with patient, bedside RN, charge RN, , pharmacy and GYN/Onc-Dr. Dejesus.    Consultants/Specialty  GYN/Onc    Code Status  Full Code    Disposition  Patient is not medically cleared.   Anticipate discharge to to home with close outpatient follow-up.  I have placed the appropriate orders for post-discharge needs.    Review of Systems  Review of Systems   Constitutional: Positive for malaise/fatigue. Negative for chills and fever.   HENT: Negative for congestion and sore throat.    Respiratory: Positive for cough and shortness of breath. Negative for sputum production and wheezing.    Cardiovascular: Positive for leg swelling. Negative for chest pain.   Gastrointestinal: Negative for abdominal pain, blood in stool, constipation, diarrhea, heartburn, nausea and vomiting.   Genitourinary: Negative for dysuria, frequency, hematuria and urgency.   Musculoskeletal: Negative for back pain.   Neurological: Negative for tingling, speech change and headaches.   Psychiatric/Behavioral: The patient is nervous/anxious.         Physical Exam  Temp:  [35.9 °C (96.6 °F)-36.5 °C (97.7 °F)] 36.5 °C (97.7 °F)  Pulse:  [100-107] 102  Resp:  [16-18] 18  BP: (100-130)/(56-72) 124/72  SpO2:  [88 %-93 %] 91 %    Physical Exam  Vitals and nursing note reviewed.   Constitutional:       General: She is in acute distress.      Appearance: She is obese. She is ill-appearing.   HENT:      Head: Normocephalic and atraumatic.      Right Ear: External ear normal.      Left Ear: External ear  normal.   Eyes:      General: No scleral icterus.  Cardiovascular:      Rate and Rhythm: Normal rate.      Heart sounds: No murmur heard.     Pulmonary:      Breath sounds: No wheezing or rales.      Comments: Shortness of breath, diminished on bases   Abdominal:      General: There is distension.      Palpations: Abdomen is soft.      Tenderness: There is no abdominal tenderness. There is no guarding.   Musculoskeletal:      Right lower leg: Edema present.      Left lower leg: Edema present.   Skin:     Coloration: Skin is not jaundiced.   Neurological:      Mental Status: She is alert and oriented to person, place, and time.   Psychiatric:         Mood and Affect: Mood normal.         Behavior: Behavior normal.         Thought Content: Thought content normal.         Judgment: Judgment normal.         Fluids    Intake/Output Summary (Last 24 hours) at 7/17/2021 1215  Last data filed at 7/17/2021 1100  Gross per 24 hour   Intake 340 ml   Output --   Net 340 ml       Laboratory  Recent Labs     07/15/21  0519 07/16/21  0858   WBC 27.2* 27.9*   RBC 4.98 4.79   HEMOGLOBIN 14.3 13.7   HEMATOCRIT 43.9 41.8   MCV 88.2 87.3   MCH 28.7 28.6   MCHC 32.6* 32.8*   RDW 43.8 43.8   PLATELETCT 489* 411   MPV 9.3 9.5     Recent Labs     07/15/21  0519 07/16/21  0858   SODIUM 134* 136   POTASSIUM 5.1 5.1   CHLORIDE 101 104   CO2 15* 15*   GLUCOSE 138* 110*   BUN 57* 62*   CREATININE 1.30 1.19   CALCIUM 9.0 9.2                   Imaging  EC-ECHOCARDIOGRAM COMPLETE W/O CONT   Final Result      DX-CHEST-2 VIEWS   Final Result      1. Continued moderate left pleural effusion, obscuring the left lung base.   2. Small right pleural effusion.   3. No other acute findings.      US-THORACENTESIS PUNCTURE RIGHT   Final Result      1. Ultrasound guided right sided diagnostic and therapeutic thoracentesis.      2. 1200 mL of fluid withdrawn.      DX-CHEST-PORTABLE (1 VIEW)   Final Result      1. Interval decrease in size of the right pleural  "effusion after right thoracentesis. No pneumothorax.   2. Continued moderate to large left pleural effusion, obscuring the left lung base.      CT-CTA CHEST PULMONARY ARTERY W/ RECONS    (Results Pending)        Assessment/Plan  Anxiety attack  Assessment & Plan  Pt possible has some PTSD/adjsument disorder per previous diagnose of [place] fever   Lot of misconception of medical term and some delusion/ilusons on her care   7/16-after sever panic attack. Talking with one word sentence. Wanted to leave AMA without giving a safe plan. Legal hold initiated.   7/17- doing better. Communicating properly. Refusing all meds. Paced herself. Consider to discontinue legal hold. Pending psychiatry eval     Acute respiratory failure with hypoxia (HCC)- (present on admission)  Assessment & Plan  Factor at this point.  IV fluid versus infection on arrival versus malignancy versus PE versus central \"valley fever  Echocardiogram to follow  Lasix IV 40 mg daily  Consider CT PE if D-dimer is positive  Thoracentesis.  Follow-up on results    History of [place] fever- (present on admission)  Assessment & Plan  Consider CT chest      Post-menopausal bleeding- (present on admission)  Assessment & Plan  Never normal.  CT abdomen/pelvis as per above not normal.  Appreciate recommendations per Dr. Pedro.    Hypoalbuminemia- (present on admission)  Assessment & Plan  Malnutrition/versus malignancy.  Close monitoring.    Thrombocytosis (HCC)- (present on admission)  Assessment & Plan  Reactive.  Continue to monitor.  canNot rule out infection/malignancy/PE    Pleural effusion- (present on admission)  Assessment & Plan  Thoracentesis ordered.  Echo cardiogram to follow  BNP to follow  Repeat chest x-ray 7/15  Lasix 40 mg IV  7/16- follow up pathology, cultures. Continue antibiotic     Uterine mass- (present on admission)  Assessment & Plan  Appreciate recommendations per Dr. Dejesus.  Follow-up on pathology from pleural studies  7/16- " going for surgery/biopsy  7/17 refusing surgery and biopsy. Palliative care     Leukocytosis- (present on admission)  Assessment & Plan  Hold on antibiotic/ no improvement   Blood cultures 7/12 -  Urine culture 7/7 -  If clinical condition worsen consider meropenem and vancomycin.  Start repeat cultures.  Consider also adding Diflucan  Can also rule out PE/malignancy  7/16- continue to monitor. Hold on antibiotic. Doing stable without them   7/17- stable without antibiotic. I will discontinue them     CAROLIN (acute kidney injury) (HCC)- (present on admission)  Assessment & Plan  7/15- slight improvement.   Prerenal/cardiorenal ?? Pt volume overload. No recent contrast exposure   Continue to monitor   7/16- continue to monitor. Encourage to take lasix . Improving  7/17- continue to monitor if pt agrees to more lab     Hyponatremia- (present on admission)  Assessment & Plan  Hypervolemic   Continue to monitor. Avoid salt tabs   Urine/protein/cr ratio low. nephrotic syndrome ruled out. Minimal protein in UA On arrival. Not likely the cause     Sepsis (HCC)- (present on admission)  Assessment & Plan  This is Sepsis Present on admission  SIRS criteria identified on my evaluation include: Tachycardia, with heart rate greater than 90 BPM and Leukocytosis, with WBC greater than 12,000  Source is UTI  Sepsis protocol initiated  Fluid resuscitation ordered per protocol  IV antibiotics as appropriate for source of sepsis  While organ dysfunction may be noted elsewhere in this problem list or in the chart, degree of organ dysfunction does not meet CMS criteria for severe sepsis  7/15-pt was admitted for sepsis. Subjective fever at home. And above criteria. Source possible UTI. + urinary symptoms.  However patient not improving on antibiotic.  Blood cultures/urine cultures negative.  Discontinue antibiotic.  Continue to monitor this point.  Patient started to become hypotensive, fever start meropenem (rash with penicillin, however  worth to challenge it) and vancomycin.  Also patient has history of [place] Valley fever. Consider to add diflucan. Also other explanation for sepsis picture is PE/ [place] Valley fever/malignancy . + pleural fluid on abdominal/pelvis CT  Lactic acid elevated on arrival   D-dimer/procal to follow   Consider CT/PE if d-dimer positive. GFR improving   Stop IVF. BP stable. Lasix 40 mg IV   Echocardiogram to follow   7/16- breathing better. Continue to monitor. Start antibiotic if signs of sepsis. Consider fluconazole to add . Start ceftriaxone and flagyl   7/17-she has been 2 days without antibiotic. still vitals are stable which most likely indicative that patient has malignancy instead of infection.             VTE prophylaxis: enoxaparin ppx    I have performed a physical exam and reviewed and updated ROS and Plan today (7/17/2021). In review of yesterday's note (7/16/2021), there are no changes except as documented above.

## 2021-07-17 NOTE — CONSULTS
BRIEF INPATIENT BEHAVIORAL HEALTH NOTE       Consult to IP Psychiatry received.  Please note that psychiatric medication management services are not available until 7/19/21.  Patient will receive follow up at that time.      Thank you for the consult    Kathleen Cabrera [facility]  Behavioral Health Therapist

## 2021-07-17 NOTE — PROGRESS NOTES
Pt returned to unit from pre-op. Pt is wanting to leave AMA. Dr. Kemp paged.   Pt states she will wait for provider before leaving AMA.     Return call from MD. Phone handed to pt. Per pt, MD is not capacitated to leave. MD will start legal hold.

## 2021-07-17 NOTE — THERAPY
Physical Therapy   Initial Evaluation     Patient Name: Madelyn Montes  Age:  71 y.o., Sex:  female  Medical Record #: [id number]  Today's Date: 7/17/2021     Precautions: Fall Risk    Assessment  Patient is 71 y.o. female presenting w/ an ovarian cyst and ascites.  She lives in a 2nd story apartment by herself w/ a FOS to enter.  The pt was previously independent w/ functional mobility tasks and ambulating community distances w/ no AD.  Currently, the pt appears to be mostly limited by fear and anxiety surrounding her medical status.  She requires modA to sit EOB from supine w/ the HOB raised, however can perform STS EOB and transfer EOB->chair using FWW spv.  The pt's gait distance was limited by fatigue and weakness.  As of now, recommend placement given pt's current limitations w/ bed mobility and activity tolerance, however anticipate she would be able to return home w/ HH w/ 2-3 more PT sessions.  Will follow to improve bed mobility, activity tolerance, and assess stair navigation.     Plan    Recommend Physical Therapy 3 times per week until therapy goals are met for the following treatments:  Bed Mobility, Community Re-integration, Equipment, Gait Training, Manual Therapy, Neuro Re-Education / Balance, Orthotics Training, Self Care/Home Evaluation, Stair Training, Therapeutic Activities and Therapeutic Exercises    DC Equipment Recommendations: Unable to determine at this time  Discharge Recommendations: Recommend post-acute placement for additional physical therapy services prior to discharge home       Subjective/Objective       07/17/21 1041   Prior Living Situation   Prior Services Home-Independent   Housing / Facility 2 Story Apartment / Condo   Steps Into Home   (FOS)   Steps In Home 0   Equipment Owned None   Lives with - Patient's Self Care Capacity Alone and Able to Care For Self   Comments Pt lives alone in a 2nd story apt w/ a fos to enter   Prior Level of Functional Mobility   Bed Mobility  Independent   Transfer Status Independent   Ambulation Independent   Distance Ambulation (Feet)   (community distances)   Assistive Devices Used None   Stairs Independent   History of Falls   History of Falls No   Cognition    Cognition / Consciousness WDL   Level of Consciousness Alert   Comments Pt pleasant and cooperative, however significantly anxious regarding medical status   Active ROM Lower Body    Active ROM Lower Body  WDL   Comments   (observed during functional mobility)   Strength Lower Body   Lower Body Strength  WDL   Comments as above   Sensation Lower Body   Lower Extremity Sensation   WDL   Comments pt reports no numbness/tingling in LE's   Coordination Lower Body    Coordination Lower Body  WDL   Balance Assessment   Sitting Balance (Static) Good   Sitting Balance (Dynamic) Fair +   Standing Balance (Static) Fair   Standing Balance (Dynamic) Fair   Weight Shift Sitting Good   Weight Shift Standing Fair   Comments stand w/ FWW   Gait Analysis   Gait Level Of Assist Supervised   Assistive Device Front Wheel Walker   Distance (Feet) 10   # of Times Distance was Traveled 1   Deviation Bradykinetic   Weight Bearing Status FWB BLE   Comments distance limited by fatigue   Bed Mobility    Supine to Sit Moderate Assist  (for trunk support)   Sit to Supine   (pt seated in chair end of session)   Scooting Supervised   Comments HOB elevated   Functional Mobility   Sit to Stand Supervised   Bed, Chair, Wheelchair Transfer Supervised   Transfer Method Stand Step   Mobility EOB->chair   Comments FWW   Activity Tolerance   Sitting in Chair >10min   Sitting Edge of Bed 10min   Standing 5min   Short Term Goals    Short Term Goal # 1 The pt will demo ability to walk 150' w/ no AD spv in 6 visits for household ambulation   Short Term Goal # 2 The pt will demo ability to perform supine<>sit EOB w/ HOB flat spv in 6 visits for independence w/ bed mobility   Short Term Goal # 3 The pt will demo ability to ascend/descend  10 stairs w/ UE support on railing spv in 6 visits to enter/exit her home.   Education Group   Education Provided Role of Physical Therapist;Use of Assistive Device   Role of Physical Therapist Patient Response Patient;Acceptance;Explanation;Demonstration;Verbal Demonstration;Action Demonstration   Use of Assistive Device Patient Response Patient;Acceptance;Explanation;Demonstration;Verbal Demonstration;Action Demonstration   Additional Comments Pt receptive of edu provided   Problem List    Problems Pain;Impaired Bed Mobility;Impaired Ambulation;Impaired Transfers;Functional Strength Deficit;Impaired Balance;Decreased Activity Tolerance

## 2021-07-17 NOTE — ASSESSMENT & PLAN NOTE
Pt possible has some PTSD/adjsument disorder per previous diagnose of [place] fever   Lot of misconception of medical term and some delusion/ilusons on her care   Continue ativan prn   Comfort care

## 2021-07-17 NOTE — PROGRESS NOTES
[place].  RN went into room.  Pt stated that she has pain.  RN offered pain med but pt refused to take it.  Disoriented to time.  RN explained re: POC and need of IV so she can get IV antibiotic.  Pt refused to get any medications.

## 2021-07-18 PROBLEM — Z71.89 ADVANCE CARE PLANNING: Status: ACTIVE | Noted: 2021-01-01

## 2021-07-18 NOTE — PROGRESS NOTES
Disoriented to time.  C/o back and abdominal pain.  Tylenol given.  Pt requested RN to bring tylenol and eye drops every q 4hrs.  Pt continues to refuse iv insertion for lasix.  Pt had harder time getting out from bed and getting up from toilet compare to last night shift.  Continues to have safety sitter.  Continues to have bed alarm.        1 RN Skin Assessment completed.   Patient's risk of skin breakdown: Medium    Devices in place and skin assessed under:   [] Pulse Ox  [] PIV [] Central Line [] SCDs []Purewick  [] Jewell  []Condom Cath   [] BMS        []  Cortrak   []  Oxymask   [] Bipap    [x] Nasal Canula   [] N/A   [] Other(specify):     Right Ear  [x] WDL                or           [] Skin issue present (please provide assessment/description below)    Left Ear  [x] WDL                or           [] Skin issue present (please provide assessment/description below)    Right Elbow:  [x] WDL               or           [] Skin issue present (please provide assessment/description below)    Left Elbow:   [x] WDL                or           [] Skin issue present (please provide assessment/description below)    Coccyx/Buttocks:  [] WDL                or           [x] Skin issue present (please provide assessment/description below)  Red, excoriated with multiple open area.      Right Heel/Foot/Toes:  [] WDL                or           [x] Skin issue present (please provide assessment/description below)  swelling     Left Heel/Foot/Toes:   [] WDL              or           [x] Skin issue present (please provide assessment/description below)  swelling    **Describe any other skin issues in areas not already listed from above list:   [] N/A    Interventions In Place: NC W/Ear Foams and Pressure Redistribution Mattress    **If any new or digressing skin issues are found, fill out the selection below.    Possible Skin Injury Yes  Pictures Uploaded Into Epic: Yes, already done by other RN  Wound Consult Placed: Yes  RN  Wound Prevention Protocol Ordered: No    What new preventative interventions did you add? NC W/Ear Foams and Barrier Cream

## 2021-07-18 NOTE — PROGRESS NOTES
[facility] Medicine Daily Progress Note    Date of Service  7/18/2021    Chief Complaint  Madelyn Montes is a 71 y.o. female admitted 7/14/2021 for UTI, abdominal pain    Hospital Course  71-year-old female past medical history of obesity, [place] fever, of retinal disorders, presented 7/12/2021 with generalized weakness fever, chills, suprapubic pain, dysuria, increased frequency at [facility]. Patient was discharged from ED on 7/7 on Augmentin for possible urinary tract infection. She was not able to swallow the antibiotic therefore she presented again back to the emergency room at 7/12/2021. On arrival she was septic, white count 25.3, tachycardic. She was started on ceftriaxone with no improvement. CT of the abdomen/pelvis was done and showed enlargement of the uterus with exophytic partially calcified mass, moderate pleural fluids, Small volume of abdominal and moderate volume of pelvic ascites. GYN/oncology Dr. Dejesus was consulted for further evaluation concern for possible GYN malignancy since patient not improving on IV antibiotic. He did advise patient to be transferred for higher level of care at [facility] and further evaluation. Appreciate his advice. Previous urine culture 7/7 negative. Blood cultures 7/12/2021 negative.      Interval Problem Update  She is not feeling well. She is volume overloaded. Shortness of breath. Thoracentesis ordered in place. Discussed case with Dr. Dejesus I appreciate his advice.  7/16-patient is very emotional today and in tears.  She is worried about the surgery.  Not sure what type of surgery she is getting because consultant note from Dr. Dejesus is pending.  I encourage patient to take Lasix.  She agreed.  She is also worried about valley fever.  I did explain that so far the fungal cultures are no growth.  Continue to monitor.  D-dimmer significantly elevated. CTA chest urgent. Start antibiotic     7/17-pt was ready on pre-op.  Then she decided  "against surgery and further tests. She does not want pain meds. She is extreme pain/moaning from abdominal pain, she had read bad things about oxycodone and she does not want to try it. We don't have available codeine. Yesterday, she was talking only on one sentence word \"activate code. 642..AMA.\" she wanted to leave. We asked where and she said home. We asked how she said \"walk\". Extreme agitated. Legal hold initiated. She was not able to communicate a safe plan. She refused the biotic, CT scan, pain meds.    Today in the morning she is able to compensate properly.  She looks in distress.  Still refusing meds as per above reasons.  She refuses IV line.  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  She is being followed for that at [facility].  Has never been treated.  The antibodies have been positive.  No true symptomatic spread or symptoms.  Possible some calcification on previous chest x-ray per patient.  She reports extra respiratory symptoms as fatigue, arthritis.  She was transferred from [facility] she was hoping a second opinion for that.  That have never been able to be addressed because of the concern of malignancy.  She is allergy from contrast. she does not want steroid premedicated because she is worried of eye bleeds from previous retinal treatment. She has some misconception of how medical team works in the hospital. I did explain clearly that from lab works and current treatment, it is concerning for PE and malignancy. Fungal culture have been negative so far. I understand pt point that if she does all above intervention outcome still very poor if she has cancer. However if she has some infection ?? That is reversible. I did explain to pt that if she feels that her quality of life has been good so far and she does not want anything more invasive that is very reasonable. She asked for more time. Will consult palliative. Continue " supportive treatment, either of them she is refusing. Most likely I will discontinue legal hold.    I spent a total of 44 minutes of  face to face time performing additional research, counseling discussing plan of care/goals of care with patient. It does not include clinical time, procedure time and discussion with medical team rest, other healthcare provider.     7/18- pt continues to worsen. Breathing worsen. Swelling is worsening. She agreed for lasix. She is refusing lovenox. Will attempt again to follow up with Dr. Dejesus. Pt is aware that her condition is worsening. Echocardiogram unremarkable. Still cannot rule out PE. Will order lower extremity DVTs and V/Q if possible.  Lower extremity DVTs positive.  Most likely patient has PE.  I have discussed with Dr. Dejesus and patient, and her friend.  Dr. Dejesus is a very competent and patient has very advanced uterus cancer. She has very poor prognosis. Patient does not want anymore surgery or anything to prolong her suffering. She does not want blood thinners. She just want to be comfortable. Friend is present. Comfort care measurement complete.       I have personally seen and examined the patient at bedside. I discussed the plan of care with patient, bedside RN, charge RN, , pharmacy and GYN/Onc-Dr. Dejesus.    Consultants/Specialty  GYN/Onc    Code Status  DNAR/DNI    Disposition  Patient is not medically cleared.   Anticipate discharge to to home with close outpatient follow-up.  I have placed the appropriate orders for post-discharge needs.    Review of Systems  Review of Systems   Constitutional: Positive for malaise/fatigue. Negative for chills and fever.   HENT: Negative for congestion and sore throat.    Respiratory: Positive for cough and shortness of breath. Negative for sputum production and wheezing.    Cardiovascular: Positive for leg swelling. Negative for chest pain.   Gastrointestinal: Negative for abdominal pain, blood in stool, constipation,  diarrhea, heartburn, nausea and vomiting.   Genitourinary: Negative for dysuria, frequency, hematuria and urgency.   Musculoskeletal: Negative for back pain.   Neurological: Negative for tingling, speech change and headaches.   Psychiatric/Behavioral: The patient is nervous/anxious.         Physical Exam  Temp:  [36.3 °C (97.3 °F)-36.5 °C (97.7 °F)] 36.5 °C (97.7 °F)  Pulse:  [] 98  Resp:  [16-24] 18  BP: (122-144)/(53-68) 144/64  SpO2:  [91 %-92 %] 92 %    Physical Exam  Vitals and nursing note reviewed.   Constitutional:       General: She is in acute distress.      Appearance: She is obese. She is ill-appearing.   HENT:      Head: Normocephalic and atraumatic.      Right Ear: External ear normal.      Left Ear: External ear normal.   Eyes:      General: No scleral icterus.  Cardiovascular:      Rate and Rhythm: Normal rate.      Heart sounds: No murmur heard.     Pulmonary:      Breath sounds: No wheezing or rales.      Comments: Shortness of breath, diminished on bases   Abdominal:      General: There is distension.      Palpations: Abdomen is soft.      Tenderness: There is no abdominal tenderness. There is no guarding.   Musculoskeletal:      Right lower leg: Edema present.      Left lower leg: Edema present.   Skin:     Coloration: Skin is not jaundiced.   Neurological:      Mental Status: She is alert and oriented to person, place, and time.   Psychiatric:         Mood and Affect: Mood normal.         Behavior: Behavior normal.         Thought Content: Thought content normal.         Judgment: Judgment normal.         Fluids    Intake/Output Summary (Last 24 hours) at 7/18/2021 1241  Last data filed at 7/18/2021 0430  Gross per 24 hour   Intake 650 ml   Output --   Net 650 ml       Laboratory  Recent Labs     07/16/21  0858   WBC 27.9*   RBC 4.79   HEMOGLOBIN 13.7   HEMATOCRIT 41.8   MCV 87.3   MCH 28.6   MCHC 32.8*   RDW 43.8   PLATELETCT 411   MPV 9.5     Recent Labs     07/16/21  0858   SODIUM 136    POTASSIUM 5.1   CHLORIDE 104   CO2 15*   GLUCOSE 110*   BUN 62*   CREATININE 1.19   CALCIUM 9.2                   Imaging  EC-ECHOCARDIOGRAM COMPLETE W/O CONT   Final Result      DX-CHEST-2 VIEWS   Final Result      1. Continued moderate left pleural effusion, obscuring the left lung base.   2. Small right pleural effusion.   3. No other acute findings.      US-THORACENTESIS PUNCTURE RIGHT   Final Result      1. Ultrasound guided right sided diagnostic and therapeutic thoracentesis.      2. 1200 mL of fluid withdrawn.      DX-CHEST-PORTABLE (1 VIEW)   Final Result      1. Interval decrease in size of the right pleural effusion after right thoracentesis. No pneumothorax.   2. Continued moderate to large left pleural effusion, obscuring the left lung base.      US-EXTREMITY VENOUS LOWER BILAT    (Results Pending)        Assessment/Plan  Advance care planning- (present on admission)  Assessment & Plan  Discuss with pt code status. Her condition is worsening. She understand that with current condition if resuscitation will be attempted outcome are very poor .   She tells me that she has had requested and she has home documents that she is DNAR/DNI  Code status changes  Pt understands that she is heading more of comfort care measurement   Palliative consult pending  I spent a total additional time of 15 minutes of face to face time performing additional counseling,, discussing plan of care with patient, discuss goals of care, code status. This time does not include clinical time, medical records review, etc       Anxiety attack- (present on admission)  Assessment & Plan  Pt possible has some PTSD/adjsument disorder per previous diagnose of [place] fever   Lot of misconception of medical term and some delusion/ilusons on her care   7/16-after sever panic attack. Talking with one word sentence. Wanted to leave AMA without giving a safe plan. Legal hold initiated.   7/17- doing better. Communicating properly. Refusing  "all meds. Paced herself. Consider to discontinue legal hold. Pending psychiatry eval   7/18- off legal hold. Discontinue seroquel. Stable. I will discontinue psych consult     Acute respiratory failure with hypoxia (HCC)- (present on admission)  Assessment & Plan  Factor at this point.  IV fluid versus infection on arrival versus malignancy versus PE versus central \"valley fever  Echocardiogram to follow  Lasix IV 40 mg daily  Consider CT PE if D-dimer is positive  Thoracentesis. No growth   Pathology pending   7/18-DVTs pending. Refusing lovenox injection. Agrees to IV lasix.    History of [facility] fever- (present on admission)  Assessment & Plan  Pleural fungal cultures ngt      Post-menopausal bleeding- (present on admission)  Assessment & Plan  Never normal.  CT abdomen/pelvis as per above not normal.  Appreciate recommendations per Dr. Pedro.    Hypoalbuminemia- (present on admission)  Assessment & Plan  Malnutrition/versus malignancy.  Close monitoring.    Thrombocytosis (HCC)- (present on admission)  Assessment & Plan  Reactive.  Continue to monitor.  canNot rule out infection/malignancy/PE    Pleural effusion- (present on admission)  Assessment & Plan  Thoracentesis ordered.  Echo cardiogram to follow  BNP to follow  Repeat chest x-ray 7/15  Lasix 40 mg IV  7/16- follow up pathology, cultures. Continue antibiotic   7/18-pathology still pending. No signs of infection. No need for antibiotic     Uterine mass- (present on admission)  Assessment & Plan  Appreciate recommendations per Dr. Dejesus.  Follow-up on pathology from pleural studies  7/16- going for surgery/biopsy  7/17 refusing surgery and biopsy. Palliative care   7/18- pt agrees to surgery now. I did let Dr. Dejesus know . Pending his recs     Leukocytosis- (present on admission)  Assessment & Plan  Hold on antibiotic/ no improvement   Blood cultures 7/12 -  Urine culture 7/7 -  If clinical condition worsen consider meropenem and vancomycin.  Start " repeat cultures.  Consider also adding Diflucan  Can also rule out PE/malignancy  7/16- continue to monitor. Hold on antibiotic. Doing stable without them   7/17- stable without antibiotic. I will discontinue them   7/18- not septic. No need for antibiotic. All cultures ngt     CAROLIN (acute kidney injury) (HCC)- (present on admission)  Assessment & Plan  7/15- slight improvement.   Prerenal/cardiorenal ?? Pt volume overload. No recent contrast exposure   Continue to monitor   7/16- continue to monitor. Encourage to take lasix . Improving  7/17- continue to monitor if pt agrees to more lab   7/18-repeat lab, continue to monitor     Hyponatremia- (present on admission)  Assessment & Plan  Hypervolemic   Continue to monitor. Avoid salt tabs   Urine/protein/cr ratio low. nephrotic syndrome ruled out. Minimal protein in UA On arrival. Not likely the cause     Sepsis (HCC)- (present on admission)  Assessment & Plan  This is Sepsis Present on admission  SIRS criteria identified on my evaluation include: Tachycardia, with heart rate greater than 90 BPM and Leukocytosis, with WBC greater than 12,000  Source is UTI  Sepsis protocol initiated  Fluid resuscitation ordered per protocol  IV antibiotics as appropriate for source of sepsis  While organ dysfunction may be noted elsewhere in this problem list or in the chart, degree of organ dysfunction does not meet CMS criteria for severe sepsis  7/15-pt was admitted for sepsis. Subjective fever at home. And above criteria. Source possible UTI. + urinary symptoms.  However patient not improving on antibiotic.  Blood cultures/urine cultures negative.  Discontinue antibiotic.  Continue to monitor this point.  Patient started to become hypotensive, fever start meropenem (rash with penicillin, however worth to challenge it) and vancomycin.  Also patient has history of [place] fever. Consider to add diflucan. Also other explanation for sepsis picture is PE/ [place]  fever/malignancy . + pleural fluid on abdominal/pelvis CT  Lactic acid elevated on arrival   D-dimer/procal to follow   Consider CT/PE if d-dimer positive. GFR improving   Stop IVF. BP stable. Lasix 40 mg IV   Echocardiogram to follow   7/16- breathing better. Continue to monitor. Start antibiotic if signs of sepsis. Consider fluconazole to add . Start ceftriaxone and flagyl   7/17-she has been 2 days without antibiotic. still vitals are stable which most likely indicative that patient has malignancy instead of infection.             VTE prophylaxis: enoxaparin ppx    I have performed a physical exam and reviewed and updated ROS and Plan today (7/18/2021). In review of yesterday's note (7/17/2021), there are no changes except as documented above.

## 2021-07-18 NOTE — PROGRESS NOTES
Pt only wanted to take one tylenol this time even though pain level up from last time.  Pt also requested to have Oxy mask instead of NC.  Oxy mask provided per pt's request.

## 2021-07-18 NOTE — CARE PLAN
The patient is Watcher - Medium risk of patient condition declining or worsening    Shift Goals  Clinical Goals: pain management    Progress made toward(s) clinical / shift goals:  pain med offered but pt only agreed to take tylenol.      Patient is not progressing towards the following goals:

## 2021-07-18 NOTE — PROGRESS NOTES
Lab called with critical result of WBC 39.6 Critical lab result read back.   Dr. Kemp notified of critical lab result via [employer] message.

## 2021-07-18 NOTE — PROGRESS NOTES
Assumed care of patient this shift. Patient is alert and oriented x 3, disoriented to time. Able to make her needs known. Complained of pain this shift, medicated with PRN; see MAR. Patient agreeable to have new PIV placed and to receive Lasix IV as scheduled. Purewick placed per patient request. Fall prevention tactics in place, bed locked and in lowest position and bed alarm on for safety.

## 2021-07-18 NOTE — PROGRESS NOTES
1 RN Skin Assessment completed.   Patient's risk of skin breakdown: Medium     Devices in place and skin assessed under:   []? Pulse Ox  []? PIV []? Central Line []? SCDs [x]?Purewick  []? Jewell  []?Condom Cath   []? BMS        []?  Cortrak   [x]?  Oxymask   []? Bipap    []? Nasal Canula   []? N/A   []? Other(specify):      Right Ear  [x]? WDL                or           []? Skin issue present (please provide assessment/description below)     Left Ear  [x]? WDL                or           []? Skin issue present (please provide assessment/description below)     Right Elbow:  [x]? WDL               or           []? Skin issue present (please provide assessment/description below)     Left Elbow:   [x]? WDL                or           []? Skin issue present (please provide assessment/description below)     Coccyx/Buttocks:  []? WDL                or           [x]? Skin issue present (please provide assessment/description below)    Red, purple, excoriated with open areas .       Right Heel/Foot/Toes:  []? WDL                or           [x]? Skin issue present (please provide assessment/description below)  Edema      Left Heel/Foot/Toes:   []? WDL              or           [x]? Skin issue present (please provide assessment/description below)  Edema        **Describe any other skin issues in areas not already listed from above list:   [x]? N/A     Interventions In Place:   Waffle mattress overlay, Purewick placed      **If any new or digressing skin issues are found, fill out the selection below.     Possible Skin Injury: Yes   Pictures Uploaded Into Epic: Yes, done previously   Wound Consult Placed: Yes, already in place   RN Wound Prevention Protocol Ordered: Yes  What new preventative interventions did you add? Purewick

## 2021-07-19 NOTE — PROGRESS NOTES
1 RN Skin Assessment completed.   Patient's risk of skin breakdown: Medium     Devices in place and skin assessed under:   []?? Pulse Ox  []?? PIV []?? Central Line []?? SCDs []??Purewick  [x]?? Jewell  []??Condom Cath   []?? BMS        []??  Cortrak   []??  Oxymask   []?? Bipap    [x]?? Nasal Canula   []?? N/A   []?? Other(specify):      Right Ear  [x]?? WDL                or           []?? Skin issue present (please provide assessment/description below)     Left Ear  [x]?? WDL                or           []?? Skin issue present (please provide assessment/description below)     Right Elbow:  [x]?? WDL               or           []?? Skin issue present (please provide assessment/description below)     Left Elbow:   [x]?? WDL                or           []?? Skin issue present (please provide assessment/description below)     Coccyx/Buttocks:  []?? WDL                or           [x]?? Skin issue present (please provide assessment/description below)     Red, purple, excoriated with open areas .       Right Heel/Foot/Toes:  []?? WDL                or           [x]?? Skin issue present (please provide assessment/description below)  Edema      Left Heel/Foot/Toes:   []?? WDL              or           [x]?? Skin issue present (please provide assessment/description below)  Edema         **Describe any other skin issues in areas not already listed from above list:   [x]?? N/A     Interventions In Place:   Low air loss mattress

## 2021-07-19 NOTE — CARE PLAN
The patient is Stable - Low risk of patient condition declining or worsening    Shift Goals  Clinical Goals: Patient's pain will be controlled     Progress made toward(s) clinical / shift goals:   Patient is now on comfort focused cares. PRN pain medications administered per MAR. Patient noted relief from pain with interventions.       Problem: Knowledge Deficit - Standard  Goal: Patient and family/care givers will demonstrate understanding of plan of care, disease process/condition, diagnostic tests and medications  Outcome: Progressing     Problem: Skin Integrity  Goal: Skin integrity is maintained or improved  Outcome: Progressing     Problem: Fall Risk  Goal: Patient will remain free from falls  Outcome: Progressing     Problem: Pain - Standard  Goal: Alleviation of pain or a reduction in pain to the patient’s comfort goal  Outcome: Progressing

## 2021-07-19 NOTE — WOUND TEAM
[facility]  Inpatient Services  Initial Wound and Skin Care Evaluation    Admission Date: 7/14/2021     Last order of IP CONSULT TO WOUND CARE was found on 7/15/2021 from Hospital Encounter on 7/14/2021     HPI, PMH, SH: Reviewed    Past Surgical History:   Procedure Laterality Date   • CATARACT EXTRACTION WITH IOL Bilateral    • OTHER      retinal repain     Social History     Tobacco Use   • Smoking status: Never Smoker   • Smokeless tobacco: Never Used   Substance Use Topics   • Alcohol use: Yes     Comment: rarely      No chief complaint on file.    Diagnosis: Uterine mass [N85.8]    Unit where seen by Wound Team: S614/02     WOUND CONSULT/FOLLOW UP RELATED TO:  Sacrum     WOUND HISTORY:  Madelyn Montes is a 71 y.o. female with generalized weakness fevers chills, suprapubic pain dysuria and frequency.  Apparently patient was evaluated 5 days prior and was prescribed Augmentin for a urinary tract infection however she was not able to take those antibiotics due to the size of the pill which was inducing nausea and she felt she had difficulty with swallowing.  She felt her generalized weakness fevers chills and abdominal pain worsened.  She reports that the pain is in suprapubic region no radiation to other places no relieving or aggravating factors.  WBC was elevated at 25.3 and lactic acid 3.7.  Initially there was concern of UTI causing sepsis but without improvement in her symptoms with rocephin and WBC rising again, discussion of her CT results was held with Dr Dejesus with concern of pleural effusion, ascites and uterine mass.  He recommends tapping of the pleural effusion and possibly her ascites for cytology and culture and following this expanding to broader antibiotic coverage pending results of cytology.     WOUND ASSESSMENT/LDA  Wound 07/14/21 Buttocks POA DTI (Active)   Wound Image   07/18/21 1800   Site Assessment Red;Purple;Pink 07/18/21 1800   Periwound Assessment Red;Pink 07/18/21 1800    Margins Attached edges 07/18/21 1800   Closure Secondary intention 07/18/21 1800   Drainage Amount None 07/18/21 1800   Treatments Cleansed;Site care;Offloading 07/18/21 1800   Wound Cleansing Foam Cleanser/Washcloth 07/18/21 1800   Periwound Protectant Antifungal Therapy 07/18/21 1800   Dressing Cleansing/Solutions Not Applicable 07/18/21 1800   Dressing Options Open to Air 07/18/21 1800   Dressing Status Open to Air 07/18/21 1800   NEXT Weekly Photo (Inpatient Only) 07/25/21 07/18/21 1800   Shape irregular 07/18/21 1800   Wound Odor None 07/18/21 1800   Pulses N/A 07/18/21 1800   Exposed Structures None 07/18/21 1800   WOUND NURSE ONLY - Time Spent with Patient (mins) 45 07/18/21 1800   Number of days: 4        Vascular:    BARRY:   No results found.    Lab Values:    Lab Results   Component Value Date/Time    WBC 39.5 (HH) 07/18/2021 12:52 PM    RBC 5.29 07/18/2021 12:52 PM    HEMOGLOBIN 15.1 07/18/2021 12:52 PM    HEMATOCRIT 47.1 (H) 07/18/2021 12:52 PM        Culture Results show:  No results found for this or any previous visit (from the past 720 hour(s)).    Pain Level/Medicated:  Patient c/o pain,but tolerated turning       INTERVENTIONS BY WOUND TEAM:  Chart and images reviewed. Discussed with bedside RN. All areas of concern (based on picture review, LDA review and discussion with bedside RN) have been thoroughly assessed. Documentation of areas based on significant findings. This RN in to assess patient. Performed standard wound care which includes appropriate positioning, dressing removal and non-selective debridement. Pictures and measurements obtained weekly if/when required.  Preparation for Dressing removal: Dressing soaked with n/a  Cleansed with:  foam cleanser and gauze.  Sharp debridement: n/a  Minerva wound: Cleansed with foam cleanser, Prepped with n/a  Primary Dressing: SONY  Secondary (Outer) Dressing: SONY    Interdisciplinary consultation: Patient, Bedside RN (Makenzie),     EVALUATION / RATIONALE  FOR TREATMENT:  Most Recent Date:  7/18/21: patient transferred from outside facility to [facility] with POA DTI, patient is now on comfort care.  Orders were placed for larsen catheter, and antifungal cream to help with sacrum wound     Goals: Steady decrease in wound area and depth weekly.    WOUND TEAM PLAN OF CARE ([X] for frequency of wound follow up,): X  Nursing to follow orders written for wound care. Contact wound team if area fails to progress, deteriorates or with any questions/concerns  Dressing changes by wound team:                   Follow up 3 times weekly:                NPWT change 3 times weekly:     Follow up 1-2 times weekly:      Follow up Bi-Monthly:                   Follow up as needed:   Wound team is not following please re-consult if needed  Other (explain):     NURSING PLAN OF CARE ORDERS (X):  Dressing changes: See Dressing Care orders: X  Skin care: See Skin Care orders: X  RN Prevention Protocol: X  Rectal tube care: See Rectal Tube Care orders:   Other orders:    RSKIN:   CURRENTLY IN PLACE (X), APPLIED THIS VISIT (A), ORDERED (O):   Q shift Hilton:  X  Q shift pressure point assessments:  X    Surface/Positioning X  Pressure redistribution mattress       X     Low Airloss          Bariatric foam      Bariatric MERVIN O    Waffle cushion        Waffle Overlay          Reposition q 2 hours    O  TAPs Turning system   O  Z Rene Pillow     Offloading/Redistribution O  Sacral Mepilex (Silicone dressing)     Heel Mepilex (Silicone dressing)         Heel float boots (Prevalon boot)  O           Float Heels off Bed with Pillows           Respiratory O  Silicone O2 tubing         Gray Foam Ear protectors     Cannula fixation Device (Tender )          High flow offloading Clip    Elastic head band offloading device      Anchorfast                                                         Trach with Optifoam split foam             Containment/Moisture Prevention X    Rectal tube or BMS     Purwick/Condom Cath        Jewell Catheter    Barrier wipes           Barrier paste       Antifungal tx      Interdry        Mobilization X      Up to chair        Ambulate      PT/OT      Nutrition X      Dietician        Diabetes Education      POX     TF     TPN     NPO   # days     Other        Anticipated discharge plans: DERRELL  LTACH:        SNF/Rehab:                  Home Health Care:           Outpatient Wound Center:            Self/Family Care:        Other:

## 2021-07-19 NOTE — HOSPICE
New hospice consult.  Pt with uterine mass.  Declines further testing or treatment.  Pt dependent in 4/6 ADLs.  Pt had been living alone in a second story apartment.  Hospice services reviewed.  Pt considering hospice, but needs more time to consider her options.  She lacks caregivers should she return to her apartment, but wishes some time to get her affairs in order before transferring to alternative placement such as a Group Home.

## 2021-07-19 NOTE — CONSULTS
"Reason for PC Consult: Advance Care Planning    Consulted by: Dr. Kemp    Assessment:  General: Pt is a 71 y.o. female with a past medical history of elevated BMI and allergies who presented 7/12/2021 with generalized weakness fevers chills, suprapubic pain dysuria and frequency.  Apparently patient was evaluated 5 days prior and was prescribed Augmentin for a urinary tract infection however she was not able to take those antibiotics \"nausea, unable to swallow\" her generalized weakness fevers chills and abdominal pain worsened.  She reports that the pain is in suprapubic region no radiation to other places no relieving or aggravating factors.  Rated as severe 10 out of 10.  CT results were discussed with Dr Dejesus with concern of pleural effusion, ascites and uterine mass. She was transferred to [facility] scheduled for surgery, biopsy 7/17 and surgery was canceled. Per Dr. Dejesus's consult pt most likely has very advanced cancer and pt opted for comfort care measures on 7/18    Social: Pt lives on her own had a boyfriend of 17 yrs however is not involved with her care. Her good friend is Kelly Burrell and is a support to her.     Consults: Psychiatry and Gynecologic Oncology    Dyspnea: No-    Last BM: 07/17/21-    Pain: Yes-    Depression: Mood appropriate for situation-    Dementia: No;       Spiritual:  Is Yazdanism or spirituality important for coping with this illness? No-    Has a  or spiritual provider visit been requested? No    Palliative Performance Scale: 50%    Advance Directive: None-    DPOA: No-    POLST: No-      Code Status: Comfort Measures-      Outcome:  Met with pt at bedside she was asleep. Introduced self and role of Palliative care RN. Assessed pt's understanding of current medical status and overall health picture. Pt has good understanding and stated that she wanted to continue with her choice for comfort care.   Madelyn had questions about hospice care and explained the role of hospice care " and the benefit of hospice and the different disciplines that were involved. She lives on her own and would not be able to support her self. She is cautious about how much medication she takes however she is in significant pain. She understands that she will need full time care. She stated that she has a meeting today with her  to go over finacial information. Pt was agreeable for PC RN sending a choice form to [facility] care.     When speaking with pt about her desires she reiterated that she wants to be a DNR, comfort focused care. Offered to complete a Living will and she was happy to get that completed. Also reviewed her POLST and she completed with DNR, comfort focused care and no artificial nutrition.  Pt friend came in and was involved in the conversation and helped answer some questions, their biggest worry is how she was going to take care of her self out side of the hospital. PC RN talked about different option such as Group homes and SNF's. Pt does not want to be a burden to her friend Kelly any more then she has. POLST and Living Will will be scanned in to pt's chart. POLST will be place above pt's bed at time of d/c to go home with pt.   Expressed to Madelyn that if she does not feel good about her pain control to ask for breakthrough medications. She was agreeable to this plan. Pt decline spiritual care at this time.  While talking with pt RN provided supportive listening, validation, normalization and empathic support throughout the encounter. All questions answered, PC contact information provided.    Recommendations: I recommend a hospice consult.    Updated: MD    Plan: Hospice Referral to [facility]. Pt remains on comfort care. POLST completed and Living will completed.     Thank you for allowing Palliative Care to participate in this patient's care. Please feel free to call [phone] with any questions or concerns.

## 2021-07-19 NOTE — PROGRESS NOTES
Pt is A&Ox4. Pt is resting in bed, no signs of labored breathing or pain. Pt on 2.5 L O2 via nasal cannula. Call light & personal belongings within reach, bed in lowest position & locked, and bed frame alarm is on. Fall precautions in place and education provided on how to use call light. Pt running Efforts to keep anxiety low, calm environment and pt communication. Pt is comfort care. Tele sitter is in place until pt can be moved closer to nursing station. Pt updated on plan of care for the shift. Pt declines any additional needs at this time.            1 RN Skin Assessment completed.   Patient's risk of skin breakdown: Medium    Devices in place and skin assessed under:   [] Pulse Ox  [x] PIV [] Central Line [] SCDs []Purewick  [x] Jewell  []Condom Cath   [] BMS        []  Cortrak   []  Oxymask   [] Bipap    [x] Nasal Canula   [] N/A   [] Other(specify):     Right Ear  [x] WDL                or           [] Skin issue present (please provide assessment/description below)    Left Ear  [x] WDL                or           [] Skin issue present (please provide assessment/description below)    Right Elbow:  [x] WDL               or           [] Skin issue present (please provide assessment/description below)    Left Elbow:   [x] WDL                or           [] Skin issue present (please provide assessment/description below)    Coccyx/Buttocks:  [] WDL                or           [x] Skin issue present (please provide assessment/description below)   red, inflammation, blanching, some previously open spots noted- healing  Right Heel/Foot/Toes:  [x] WDL                or           [] Skin issue present (please provide assessment/description below)    Left Heel/Foot/Toes:   [x] WDL              or           [] Skin issue present (please provide assessment/description below)      **Describe any other skin issues in areas not already listed from above list:   [] N/A  Pt has bilateral lower extremity edema   Interventions  In Place: Gray Ear Foams, Pillows, Q2 Turns and Low Air Loss Mattress    **If any new or digressing skin issues are found, fill out the selection below.    Possible Skin Injury No  Pictures Uploaded Into Epic: N/A  Wound Consult Placed: N/A  RN Wound Prevention Protocol Ordered: No    What new preventative interventions did you add? N/A

## 2021-07-19 NOTE — PROGRESS NOTES
Assumed care of patient this shift. Patient is alert and oriented x 4.  Able to make her needs known. Patient is on comfort focused cares. Complained of pain this shift, medicated with PRN; see MAR. Jewell catheter in place for comfort. Low air loss mattress in place. Turned and repositioned every 2 hours. Fall prevention tactics in place, bed locked and in lowest position and bed alarm on for safety.

## 2021-07-19 NOTE — PROGRESS NOTES
Hospital Medicine Daily Progress Note    Date of Service  7/19/2021    Chief Complaint  Madelyn Montes is a 71 y.o. female admitted 7/14/2021 for UTI, abdominal pain    Hospital Course  71-year-old female past medical history of obesity, [place] fever, of retinal disorders, presented 7/12/2021 with generalized weakness fever, chills, suprapubic pain, dysuria, increased frequency at [facility]. Patient was discharged from ED on 7/7 on Augmentin for possible urinary tract infection. She was not able to swallow the antibiotic therefore she presented again back to the emergency room at 7/12/2021. On arrival she was septic, white count 25.3, tachycardic. She was started on ceftriaxone with no improvement. CT of the abdomen/pelvis was done and showed enlargement of the uterus with exophytic partially calcified mass, moderate pleural fluids, Small volume of abdominal and moderate volume of pelvic ascites. GYN/oncology Dr. Dejesus was consulted for further evaluation concern for possible GYN malignancy since patient not improving on IV antibiotic. He did advise patient to be transferred for higher level of care at [facility] and further evaluation. Appreciate his advice. Previous urine culture 7/7 negative. Blood cultures 7/12/2021 negative.  Antibiotic were discontinued.  She was scheduled for surgery, biopsy 7/17. patient got paranoid/very agitated.  Surgery was canceled.  Initially patient request to leave AMA, however she was actively psychotic from agitation and anxiety.  She was placed on legal hold which was discontinued the following day and she was able make reasonable decisions.  D-dimer above 20.  CT was not able to get done because of allergy from contrast and patient concern for eye bleeding if steroids were given.  However lower extremity DVTs positive. With that information and expertise from Dr. Dejesus that patient most likely has very advanced cancer. patient agreed for comfort care  measurement which were initiated 7/18. Multiple discussion with pt and her friend provided. Lasix were left for pt comfort care since she is very volume overload.       Interval Problem Update    7/19- feeling better, rested. Pain management current is working. She is wondering for next step. She is higher level of care per my expertise for group home. She does not qualify for hospice inpatient or skilled nursing. Continue current measurement. I don't think pt will last too long       I have personally seen and examined the patient at bedside. I discussed the plan of care with patient, bedside RN, charge RN, , pharmacy and GYN/Onc-Dr. Dejesus.    Consultants/Specialty  GYN/Onc    Code Status  Comfort Care/DNR    Disposition  Patient is not medically cleared.   Anticipate discharge to to home with close outpatient follow-up.  I have placed the appropriate orders for post-discharge needs.    Review of Systems  Review of Systems   Constitutional: Positive for malaise/fatigue. Negative for chills and fever.   HENT: Negative for congestion and sore throat.    Respiratory: Positive for shortness of breath. Negative for cough, sputum production and wheezing.    Cardiovascular: Positive for leg swelling. Negative for chest pain.   Gastrointestinal: Negative for abdominal pain, blood in stool, constipation, diarrhea, heartburn, nausea and vomiting.   Genitourinary: Negative for dysuria, frequency, hematuria and urgency.   Musculoskeletal: Negative for back pain.   Neurological: Negative for tingling, speech change and headaches.   Psychiatric/Behavioral: The patient is nervous/anxious.         Physical Exam  Temp:  [36.2 °C (97.2 °F)-36.4 °C (97.6 °F)] 36.4 °C (97.6 °F)  Pulse:  [] 96  Resp:  [18-19] 18  BP: (104-125)/(51-69) 125/69  SpO2:  [89 %-90 %] 90 %    Physical Exam  Vitals and nursing note reviewed.   Constitutional:       General: She is in acute distress.      Appearance: She is obese. She is  ill-appearing.   HENT:      Head: Normocephalic and atraumatic.      Right Ear: External ear normal.      Left Ear: External ear normal.   Eyes:      General: No scleral icterus.  Cardiovascular:      Rate and Rhythm: Normal rate.      Heart sounds: No murmur heard.     Pulmonary:      Breath sounds: No wheezing or rales.      Comments: Shortness of breath, diminished on bases   Abdominal:      General: There is distension.      Palpations: Abdomen is soft.      Tenderness: There is no abdominal tenderness. There is no guarding.   Musculoskeletal:      Right lower leg: Edema present.      Left lower leg: Edema present.   Skin:     Coloration: Skin is not jaundiced.   Neurological:      Mental Status: She is alert and oriented to person, place, and time.   Psychiatric:         Mood and Affect: Mood normal.         Behavior: Behavior normal.         Thought Content: Thought content normal.         Judgment: Judgment normal.         Fluids    Intake/Output Summary (Last 24 hours) at 7/19/2021 1241  Last data filed at 7/19/2021 1030  Gross per 24 hour   Intake 240 ml   Output 450 ml   Net -210 ml       Laboratory  Recent Labs     07/18/21  1252   WBC 39.5*   RBC 5.29   HEMOGLOBIN 15.1   HEMATOCRIT 47.1*   MCV 89.0   MCH 28.5   MCHC 32.1*   RDW 45.5   PLATELETCT 340   MPV 9.7     Recent Labs     07/18/21  1252   SODIUM 136   POTASSIUM 4.9   CHLORIDE 104   CO2 17*   GLUCOSE 136*   BUN 59*   CREATININE 1.16   CALCIUM 9.6                   Imaging  US-EXTREMITY VENOUS LOWER BILAT   Final Result      EC-ECHOCARDIOGRAM COMPLETE W/O CONT   Final Result      DX-CHEST-2 VIEWS   Final Result      1. Continued moderate left pleural effusion, obscuring the left lung base.   2. Small right pleural effusion.   3. No other acute findings.      US-THORACENTESIS PUNCTURE RIGHT   Final Result      1. Ultrasound guided right sided diagnostic and therapeutic thoracentesis.      2. 1200 mL of fluid withdrawn.      DX-CHEST-PORTABLE (1 VIEW)    Final Result      1. Interval decrease in size of the right pleural effusion after right thoracentesis. No pneumothorax.   2. Continued moderate to large left pleural effusion, obscuring the left lung base.           Assessment/Plan  Advance care planning- (present on admission)  Assessment & Plan  Discuss with pt code status. Her condition is worsening. She understand that with current condition if resuscitation will be attempted outcome are very poor .   She tells me that she has had requested and she has home documents that she is DNAR/DNI  Code status changes  Pt understands that she is heading more of comfort care measurement   Palliative consult pending  I spent a total additional time of 15 minutes of face to face time performing additional counseling,, discussing plan of care with patient, discuss goals of care, code status. This time does not include clinical time, medical records review, etc       Anxiety attack- (present on admission)  Assessment & Plan  Pt possible has some PTSD/adjsument disorder per previous diagnose of [place] fever   Lot of misconception of medical term and some delusion/ilusons on her care   Continue ativan prn   Comfort care     Acute respiratory failure with hypoxia (HCC)- (present on admission)  Assessment & Plan  Due to PE/volume overload from malignancy     History of [place] fever- (present on admission)  Assessment & Plan  Pleural fungal cultures ngt      Post-menopausal bleeding- (present on admission)  Assessment & Plan  Never normal.  CT abdomen/pelvis as per above not normal.  Appreciate recommendations per Dr. Dejesus,   Dr. Dejesus is very confident that pt has uterus cancer and her prognosis is poor  Pt does not want further treatment     Hypoalbuminemia- (present on admission)  Assessment & Plan  Malnutrition/versus malignancy.  Close monitoring.    Thrombocytosis (HCC)- (present on admission)  Assessment & Plan  Reactive. Comfort care     Pleural effusion-  (present on admission)  Assessment & Plan  Thoracentesis ordered.  Echo cardiogram unremarkable   BNP to follow  Due to malignancy   Continue lasix     Uterine mass- (present on admission)  Assessment & Plan  Appreciate recommendations per Dr. Dejesus.  Follow-up on pathology from pleural studies  -pt agrees with hospice/comfort care    Leukocytosis- (present on admission)  Assessment & Plan  Hold on antibiotic/ no improvement   Blood cultures 7/12 -  Urine culture 7/7 -  Most likely related to PE/malignancy   Comfort care     CAROLIN (acute kidney injury) (HCC)- (present on admission)  Assessment & Plan  Do not monitor. Pt is comfort care   Continue lasix for volume overload       Hyponatremia- (present on admission)  Assessment & Plan  Hypervolemic   Continue to monitor. Avoid salt tabs   Urine/protein/cr ratio low. nephrotic syndrome ruled out. Minimal protein in UA On arrival  Due to malignancy   Comfort care     Sepsis (HCC)- (present on admission)  Assessment & Plan  This is Sepsis Present on admission  SIRS criteria identified on my evaluation include: Tachycardia, with heart rate greater than 90 BPM and Leukocytosis, with WBC greater than 12,000  Source is UTI  Sepsis protocol initiated  Fluid resuscitation ordered per protocol  IV antibiotics as appropriate for source of sepsis  While organ dysfunction may be noted elsewhere in this problem list or in the chart, degree of organ dysfunction does not meet CMS criteria for severe sepsis  7/15-pt was admitted for sepsis. Subjective fever at home. And above criteria. Source possible UTI. + urinary symptoms.  However patient not improving on antibiotic.  Blood cultures/urine cultures negative.  Discontinue antibiotic.  Continue to monitor this point.  Patient started to become hypotensive, fever start meropenem (rash with penicillin, however worth to challenge it) and vancomycin.  Also patient has history of [facility] fever. Consider to add diflucan. Also other  explanation for sepsis picture is PE/ [facility] fever/malignancy . + pleural fluid on abdominal/pelvis CT  Lactic acid elevated on arrival   7/19- not likely sepsis. All related to malignancy/PE        VTE prophylaxis: enoxaparin ppx    I have performed a physical exam and reviewed and updated ROS and Plan today (7/19/2021). In review of yesterday's note (7/18/2021), there are no changes except as documented above.

## 2021-07-19 NOTE — PALLIATIVE CARE
Palliative Care follow-up  Pt is now comfort care and met with [facility] hospice Liaison yesterday and wanted some time to think about hospice care. PC RN spoke with Aryan and he stated that the pt is asking to speak with [facility] hospice Liaison, PC RN messaged Alyse RN Liaison for [facility] to pass on the information. She stated that she will see the pt today and follow up. PC RN will follow up with full consult after the conversation and help to complete POLST and Ad if pt desires.       Updated: Aryan ARCOS [facility]     Plan: C discussion with [facility] for D/c plan.     Thank you for allowing Palliative Care to support this patient and family. Contact [phone] for additional assistance, change in patient status, or with any questions/concerns.

## 2021-07-19 NOTE — CARE PLAN
Problem: Knowledge Deficit - Standard  Goal: Patient and family/care givers will demonstrate understanding of plan of care, disease process/condition, diagnostic tests and medications  Outcome: Progressing  Note: Pt educated regarding plan of care and medications. All questions answered.       Problem: Pain - Standard  Goal: Alleviation of pain or a reduction in pain to the patient’s comfort goal  Outcome: Progressing  Note: Pt assessed for pain regularly and medicated PRN per MAR.             The patient is Watcher - Medium risk of patient condition declining or worsening    Shift Goals  Clinical Goals: pain mangement, low anxiety     Progress made toward(s) clinical / shift goals:  Administer PRN medications for pain and anxiety as needed . Assessment of pain and anxiety when pt is awake.    Patient is not progressing towards the following goals:

## 2021-07-19 NOTE — DISCHARGE PLANNING
Received Choice form at 2426  Agency/Facility Name: [facility]  Referral sent per Choice form @ 7060 - KQW faxed manually to [phone]

## 2021-07-19 NOTE — DISCHARGE PLANNING
Anticipated Discharge Disposition: TBD    Action: Staffed patients' needs during IDT rounds.  Patient is now comfort care. Patient spoke to Hospice yesterday and is now wanting to talk to them again.  SW has requested a quantiferon gold.      Barriers to Discharge: discharge plan    Plan: follow up with Hospice to see what direction patient is wanting to go.

## 2021-07-20 NOTE — PROGRESS NOTES
Hospital Medicine Daily Progress Note    Date of Service  7/20/2021    Chief Complaint  Madelyn Montes is a 71 y.o. female admitted 7/14/2021 for UTI, abdominal pain    Hospital Course  71-year-old female past medical history of obesity, [place] fever, of retinal disorders, presented 7/12/2021 with generalized weakness fever, chills, suprapubic pain, dysuria, increased frequency at [facility]. Patient was discharged from ED on 7/7 on Augmentin for possible urinary tract infection. She was not able to swallow the antibiotic therefore she presented again back to the emergency room at 7/12/2021. On arrival she was septic, white count 25.3, tachycardic. She was started on ceftriaxone with no improvement. CT of the abdomen/pelvis was done and showed enlargement of the uterus with exophytic partially calcified mass, moderate pleural fluids, Small volume of abdominal and moderate volume of pelvic ascites. GYN/oncology Dr. Dejesus was consulted for further evaluation concern for possible GYN malignancy since patient not improving on IV antibiotic. He did advise patient to be transferred for higher level of care at [facility] and further evaluation. Appreciate his advice. Previous urine culture 7/7 negative. Blood cultures 7/12/2021 negative.  Antibiotic were discontinued.  She was scheduled for surgery, biopsy 7/17. patient got paranoid/very agitated.  Surgery was canceled.  Initially patient request to leave AMA, however she was actively psychotic from agitation and anxiety.  She was placed on legal hold which was discontinued the following day and she was able make reasonable decisions.  D-dimer above 20.  CT was not able to get done because of allergy from contrast and patient concern for eye bleeding if steroids were given.  However lower extremity DVTs positive. With that information and expertise from Dr. Dejesus that patient most likely has very advanced cancer. patient agreed for comfort care  measurement which were initiated 7/18. Multiple discussion with pt and her friend provided. Lasix were left for pt comfort care since she is very volume overload.       Interval Problem Update    7/20  Patient is crying out of pain, pain in the abdomen.  Current regimen does not control the pain well.  Increased dose history of Dilaudid p.o. and IV, oxycodone.    7/19- feeling better, rested. Pain management current is working. She is wondering for next step. She is higher level of care per my expertise for group home. She does not qualify for hospice inpatient or skilled nursing. Continue current measurement. I don't think pt will last too long       I have personally seen and examined the patient at bedside. I discussed the plan of care with patient, bedside RN, charge RN, , pharmacy and GYN/Onc-Dr. Dejesus.    Consultants/Specialty  GYN/Onc    Code Status  Comfort Care/DNR    Disposition  Patient is not medically cleared.   Anticipate discharge to to home with close outpatient follow-up.  I have placed the appropriate orders for post-discharge needs.    Review of Systems  Review of Systems   Constitutional: Positive for malaise/fatigue. Negative for chills and fever.   HENT: Negative for congestion and sore throat.    Respiratory: Positive for shortness of breath. Negative for cough, sputum production and wheezing.    Cardiovascular: Positive for leg swelling. Negative for chest pain.   Gastrointestinal: Negative for abdominal pain, blood in stool, constipation, diarrhea, heartburn, nausea and vomiting.   Genitourinary: Negative for dysuria, frequency, hematuria and urgency.   Musculoskeletal: Negative for back pain.   Neurological: Negative for tingling, speech change and headaches.   Psychiatric/Behavioral: The patient is nervous/anxious.         Physical Exam  Temp:  [36.2 °C (97.1 °F)-36.3 °C (97.4 °F)] 36.2 °C (97.1 °F)  Pulse:  [100-106] 106  Resp:  [16-20] 18  BP: (120-126)/(47-64) 121/47  SpO2:   [90 %-91 %] 91 %    Physical Exam  Vitals and nursing note reviewed.   Constitutional:       General: She is in acute distress.      Appearance: She is obese. She is ill-appearing.   HENT:      Head: Normocephalic and atraumatic.      Right Ear: External ear normal.      Left Ear: External ear normal.   Eyes:      General: No scleral icterus.  Cardiovascular:      Rate and Rhythm: Normal rate.      Heart sounds: No murmur heard.     Pulmonary:      Breath sounds: No wheezing or rales.      Comments: Shortness of breath, diminished on bases   Abdominal:      General: There is distension.      Palpations: Abdomen is soft.      Tenderness: There is no abdominal tenderness. There is no guarding.   Musculoskeletal:      Right lower leg: Edema present.      Left lower leg: Edema present.   Skin:     Coloration: Skin is not jaundiced.   Neurological:      Mental Status: She is alert and oriented to person, place, and time.   Psychiatric:         Mood and Affect: Mood normal.         Behavior: Behavior normal.         Thought Content: Thought content normal.         Judgment: Judgment normal.         Fluids    Intake/Output Summary (Last 24 hours) at 7/20/2021 1021  Last data filed at 7/19/2021 1755  Gross per 24 hour   Intake 240 ml   Output 375 ml   Net -135 ml       Laboratory  Recent Labs     07/18/21  1252   WBC 39.5*   RBC 5.29   HEMOGLOBIN 15.1   HEMATOCRIT 47.1*   MCV 89.0   MCH 28.5   MCHC 32.1*   RDW 45.5   PLATELETCT 340   MPV 9.7     Recent Labs     07/18/21  1252   SODIUM 136   POTASSIUM 4.9   CHLORIDE 104   CO2 17*   GLUCOSE 136*   BUN 59*   CREATININE 1.16   CALCIUM 9.6                   Imaging  US-EXTREMITY VENOUS LOWER BILAT   Final Result      EC-ECHOCARDIOGRAM COMPLETE W/O CONT   Final Result      DX-CHEST-2 VIEWS   Final Result      1. Continued moderate left pleural effusion, obscuring the left lung base.   2. Small right pleural effusion.   3. No other acute findings.      US-THORACENTESIS PUNCTURE  RIGHT   Final Result      1. Ultrasound guided right sided diagnostic and therapeutic thoracentesis.      2. 1200 mL of fluid withdrawn.      DX-CHEST-PORTABLE (1 VIEW)   Final Result      1. Interval decrease in size of the right pleural effusion after right thoracentesis. No pneumothorax.   2. Continued moderate to large left pleural effusion, obscuring the left lung base.           Assessment/Plan  Advance care planning- (present on admission)  Assessment & Plan  Comfort care/DNR per previous discussion with previous physician  Hospice referral sent out        Anxiety attack- (present on admission)  Assessment & Plan  Pt possible has some PTSD/adjsument disorder per previous diagnose of [place] fever   Lot of misconception of medical term and some delusion/ilusons on her care   Continue ativan prn   Comfort care     Acute respiratory failure with hypoxia (HCC)- (present on admission)  Assessment & Plan  Due to PE/volume overload from malignancy     History of [place] fever- (present on admission)  Assessment & Plan  Pleural fungal cultures ngt      Post-menopausal bleeding- (present on admission)  Assessment & Plan  Never normal.  CT abdomen/pelvis as per above not normal.  Appreciate recommendations per Dr. Dejesus,   Dr. Dejesus is very confident that pt has uterus cancer and her prognosis is poor  Pt does not want further treatment     Hypoalbuminemia- (present on admission)  Assessment & Plan  Malnutrition/versus malignancy    Thrombocytosis (HCC)- (present on admission)  Assessment & Plan  Reactive. Comfort care     Pleural effusion- (present on admission)  Assessment & Plan  Thoracentesis ordered.  Echo cardiogram unremarkable   BNP to follow  Due to malignancy   Continue lasix for comfort    Uterine mass- (present on admission)  Assessment & Plan  Appreciate recommendations per Dr. Dejesus.  Follow-up on pathology from pleural studies  -pt agrees with hospice/comfort care    Leukocytosis- (present on  admission)  Assessment & Plan  Hold on antibiotic/ no improvement   Blood cultures 7/12 -  Urine culture 7/7 -  Most likely related to PE/malignancy   Comfort care     CAROLIN (acute kidney injury) (HCC)- (present on admission)  Assessment & Plan  Do not monitor. Pt is comfort care   Continue lasix for volume overload       Hyponatremia- (present on admission)  Assessment & Plan  Hypervolemic   Continue to monitor. Avoid salt tabs   Urine/protein/cr ratio low. nephrotic syndrome ruled out. Minimal protein in UA On arrival  Due to malignancy   Comfort care     Sepsis (HCC)- (present on admission)  Assessment & Plan  This is Sepsis Present on admission  SIRS criteria identified on my evaluation include: Tachycardia, with heart rate greater than 90 BPM and Leukocytosis, with WBC greater than 12,000  Source is UTI  Sepsis protocol initiated  Fluid resuscitation ordered per protocol  IV antibiotics as appropriate for source of sepsis  While organ dysfunction may be noted elsewhere in this problem list or in the chart, degree of organ dysfunction does not meet CMS criteria for severe sepsis  7/15-pt was admitted for sepsis. Subjective fever at home. And above criteria. Source possible UTI. + urinary symptoms.  However patient not improving on antibiotic.  Blood cultures/urine cultures negative.  Discontinue antibiotic.  Continue to monitor this point.  Patient started to become hypotensive, fever start meropenem (rash with penicillin, however worth to challenge it) and vancomycin.  Also patient has history of [place] fever. Consider to add diflucan. Also other explanation for sepsis picture is PE/ [place] fever/malignancy . + pleural fluid on abdominal/pelvis CT  Lactic acid elevated on arrival   7/19- not likely sepsis. All related to malignancy/PE   On comfort care       VTE prophylaxis: SCD    I have performed a physical exam and reviewed and updated ROS and Plan today (7/20/2021). In review of  yesterday's note (7/19/2021), there are no changes except as documented above.

## 2021-07-20 NOTE — PROGRESS NOTES
1 RN Skin Assessment completed.   Patient's risk of skin breakdown: Medium     Devices in place and skin assessed under:   []??? Pulse Ox  []??? PIV []??? Central Line []??? SCDs []???Purewick  [x]??? Jewell  []???Condom Cath   []??? BMS        []???  Cortrak   []???  Oxymask   []??? Bipap    [x]??? Nasal Canula   []??? N/A   []??? Other(specify):      Right Ear  [x]??? WDL                or           []??? Skin issue present (please provide assessment/description below)     Left Ear  [x]??? WDL                or           []??? Skin issue present (please provide assessment/description below)     Right Elbow:  [x]??? WDL               or           []??? Skin issue present (please provide assessment/description below)     Left Elbow:   [x]??? WDL                or           []??? Skin issue present (please provide assessment/description below)     Coccyx/Buttocks:  []??? WDL                or           [x]??? Skin issue present (please provide assessment/description below)     Red, purple, excoriated with open areas .       Right Heel/Foot/Toes:  []??? WDL                or           [x]??? Skin issue present (please provide assessment/description below)  Edema      Left Heel/Foot/Toes:   []??? WDL              or           [x]??? Skin issue present (please provide assessment/description below)  Edema         **Describe any other skin issues in areas not already listed from above list:   [x]??? N/A     Interventions In Place:   Low air loss mattress

## 2021-07-20 NOTE — PROGRESS NOTES
"1125: Patient complained of 7/10 pain to abdomen. Medicated with PRN 2 mg Dilaudid tablet. Patient has been taking medications crushed in applesauce.     1200: Patient and friend at bedside called this nurse into room, patient stated that she had a small emesis and feels like she is having trouble \"getting medications down\"     1230: Patient continues to complain of severe 7/10 pain to abdomen. Medicated with PRN 10 mg oxycodone tablet, crushed in applesauce.   "

## 2021-07-20 NOTE — PROGRESS NOTES
Pt is A&Ox3, disoriented to time, easily reoriented. Pt is resting in bed, no signs of labored breathing or pain. Pt on 2 L O2 nasal cannula. Call light & personal belongings within reach, bed in lowest position & locked, and bed alarm is on. Tele sitter in place,pt is not attempting to get out of bed at this time. Pt uses call light appropriately. Pt updated on plan of care for the shift. Pt declines any additional needs at this time.       1 RN Skin Assessment completed.   Patient's risk of skin breakdown: Medium    Devices in place and skin assessed under:   [] Pulse Ox  [x] PIV [] Central Line [] SCDs []Purewick  [] Jewell  []Condom Cath   [] BMS        []  Cortrak   []  Oxymask   [] Bipap    [x] Nasal Canula   [] N/A   [] Other(specify):     Right Ear  [x] WDL                or           [] Skin issue present (please provide assessment/description below)    Left Ear  [x] WDL                or           [] Skin issue present (please provide assessment/description below)    Right Elbow:  [x] WDL               or           [] Skin issue present (please provide assessment/description below)    Left Elbow:   [x] WDL                or           [] Skin issue present (please provide assessment/description below)    Coccyx/Buttocks:  [] WDL                or           [x] Skin issue present (please provide assessment/description below)   purple/red/pink, blanching, healing previously open areas, irritation     Right Heel/Foot/Toes:  [x] WDL                or           [] Skin issue present (please provide assessment/description below)    Left Heel/Foot/Toes:   [x] WDL              or           [] Skin issue present (please provide assessment/description below)      **Describe any other skin issues in areas not already listed from above list:   [] N/A    Interventions In Place: Gray Ear Foams, Pillows, Low Air Loss Mattress, Barrier Cream and Heels Loaded W/Pillows    **If any new or digressing skin issues are found,  fill out the selection below.    Possible Skin Injury No  Pictures Uploaded Into Epic: N/A  Wound Consult Placed: N/A  RN Wound Prevention Protocol Ordered: No    What new preventative interventions did you add? N/A

## 2021-07-20 NOTE — CARE PLAN
Problem: Pain - Standard  Goal: Alleviation of pain or a reduction in pain to the patient’s comfort goal  Outcome: Progressing  Note: Pt assessed for pain regularly and medicated PRN per MAR.       Problem: Fall Risk  Goal: Patient will remain free from falls  7/20/2021 0025 by Veronica Carbone R.N.  Outcome: Progressing  Note: Fall precautions in place. Bed in lowest position. Personal possessions within reach. Mobility sign on door. Bed-alarm on. Call light within reach. Pt educated regarding fall prevention and states understanding.          The patient is Watcher - Medium risk of patient condition declining or worsening    Shift Goals  Clinical Goals: sleep comfortably, manage pain     Progress made toward(s) clinical / shift goals:  pain medication administered when available, pt sleeping on/off throughout the night.     Patient is not progressing towards the following goals:

## 2021-07-20 NOTE — HOSPICE
Spoke to patient and her friend about hospice services. Patient will need GH with hospice services. Patient states she has questions but to tired to remember them at this time ask for hospice to return tomorrow.   She does ask if we wake her if she is sleeping. .

## 2021-07-20 NOTE — PROGRESS NOTES
Assumed care of patient this shift. Patient is alert and oriented x 4.  Able to make her needs known. Patient is on comfort cares. Complained of pain this shift, medicated with PRN; see MAR. Jewell catheter in place. Low air loss mattress in place. Turned and repositioned every 2 hours. Fall prevention tactics in place, bed locked and in lowest position and bed alarm on for safety.

## 2021-07-20 NOTE — CARE PLAN
The patient is Stable - Low risk of patient condition declining or worsening    Shift Goals  Clinical Goals: Patient's pain will be controlled     Progress made toward(s) clinical / shift goals:   Patient is now on comfort focused cares. PRN pain medications administered per MAR. Patient noted relief from pain with interventions.       Problem: Knowledge Deficit - Standard  Goal: Patient and family/care givers will demonstrate understanding of plan of care, disease process/condition, diagnostic tests and medications  Outcome: Progressing     Problem: Fall Risk  Goal: Patient will remain free from falls  Outcome: Progressing     Problem: Pain - Standard  Goal: Alleviation of pain or a reduction in pain to the patient’s comfort goal  Outcome: Progressing

## 2021-07-20 NOTE — PALLIATIVE CARE
Palliative Care follow-up  PC RN met with patient at bedside. POLST form placed behind bed in pink slip. Declaration provided back to pt and placed in belongings per request. Please be sure POLST is with patient at discharge and remains behind bed if pt is transferred to new room.        Updated: YANETH RN     Plan: PC RN will follow and support.     Thank you for allowing Palliative Care to support this patient and family. Contact [phone] for additional assistance, change in patient status, or with any questions/concerns.

## 2021-07-21 NOTE — PROGRESS NOTES
4 Eyes Skin Assessment Completed by CAMELIA Prince and CAMELIA Quinones.    Head WDL  Ears Redness  Nose WDL  Mouth WDL  Neck WDL  Breast/Chest WDL  Shoulder Blades WDL  Spine WDL  (R) Arm/Elbow/Hand WDL/edema  (L) Arm/Elbow/Hand Edema  Abdomen WDL  Groin Redness  Scrotum/Coccyx/Buttocks Excoriation, stage 2/3 pressure injury  (R) Leg Edema  (L) Leg Edema  (R) Heel/Foot/Toe Edema  (L) Heel/Foot/Toe Edema          Devices In Places Jewell      Interventions In Place Gray Ear Foams, Q2 Turns, Low Air Loss Mattress, Barrier Cream and Heels Loaded W/Pillows    Possible Skin Injury Yes    Pictures Uploaded Into Epic Yes  Wound Consult Placed Yes  RN Wound Prevention Protocol Ordered Yes

## 2021-07-21 NOTE — HOSPICE
Next of kin search calls made to:    Call did not go through for the last two people.  Messages left for Delaney Park & Madelyn Montes.

## 2021-07-21 NOTE — THERAPY
Physical Therapy Contact Note    Patient Name: Madelyn Montes  Age:  71 y.o., Sex:  female  Medical Record #: [id number]  Today's Date: 7/21/2021    Pt has transitioned to comfort care measures. Per RN, pt lethargic and with difficulty moving in bed with nursing staff d/t pain. PT will sign-off at this time, please re-consult should functional needs/POC change.

## 2021-07-21 NOTE — PROGRESS NOTES
Hospital Medicine Daily Progress Note    Date of Service  7/21/2021    Chief Complaint  Madelyn Montes is a 71 y.o. female admitted 7/14/2021 for UTI, abdominal pain    Hospital Course  71-year-old female past medical history of obesity, [place] fever, of retinal disorders, presented 7/12/2021 with generalized weakness fever, chills, suprapubic pain, dysuria, increased frequency at [facility]. Patient was discharged from ED on 7/7 on Augmentin for possible urinary tract infection. She was not able to swallow the antibiotic therefore she presented again back to the emergency room at 7/12/2021. On arrival she was septic, white count 25.3, tachycardic. She was started on ceftriaxone with no improvement. CT of the abdomen/pelvis was done and showed enlargement of the uterus with exophytic partially calcified mass, moderate pleural fluids, Small volume of abdominal and moderate volume of pelvic ascites. GYN/oncology Dr. Dejesus was consulted for further evaluation concern for possible GYN malignancy since patient not improving on IV antibiotic. He did advise patient to be transferred for higher level of care at [facility] and further evaluation. Appreciate his advice. Previous urine culture 7/7 negative. Blood cultures 7/12/2021 negative.  Antibiotic were discontinued.  She was scheduled for surgery, biopsy 7/17. patient got paranoid/very agitated.  Surgery was canceled.  Initially patient request to leave AMA, however she was actively psychotic from agitation and anxiety.  She was placed on legal hold which was discontinued the following day and she was able make reasonable decisions.  D-dimer above 20.  CT was not able to get done because of allergy from contrast and patient concern for eye bleeding if steroids were given.  However lower extremity DVTs positive. With that information and expertise from Dr. Dejesus that patient most likely has very advanced cancer. patient agreed for comfort care  measurement which were initiated 7/18. Multiple discussion with pt and her friend provided. Lasix were left for pt comfort care since she is very volume overload.       Interval Problem Update  Patient was seen and examined at bedside.  I have personally reviewed and interpreted vitals, labs, and imaging.    7/21.  Afebrile.  Has been tachycardic.  On 2.5 L nasal cannula.  Patient is still confused, minimally responsive.  No longer taking p.o.  Only moans and groans incomprehensible sounds.  Switched medications to sublingual morphine and Ativan with IV Dilaudid for breakthrough.  Discussed with [facility] hospice Chris today.  Patient was accepted however unable to obtain consent.  Patient is high risk for decompensation and passing over the next day      I have personally seen and examined the patient at bedside. I discussed the plan of care with patient, bedside RN, charge RN, , pharmacy and GYN/Onc-Dr. Dejesus.    Consultants/Specialty  GYN/Onc    Code Status  Comfort Care/DNR    Disposition  Patient is not medically cleared.   Anticipate discharge to to home with close outpatient follow-up.  I have placed the appropriate orders for post-discharge needs.    Review of Systems  Review of Systems   Unable to perform ROS: Mental acuity        Physical Exam  Pulse:  [100-110] 110  Resp:  [14-18] 18  BP: (128-136)/(68-78) 136/78    Physical Exam  Vitals and nursing note reviewed.   Constitutional:       General: She is in acute distress.      Appearance: She is obese. She is ill-appearing.   HENT:      Head: Normocephalic and atraumatic.      Right Ear: External ear normal.      Left Ear: External ear normal.   Eyes:      Extraocular Movements: Extraocular movements intact.      Conjunctiva/sclera: Conjunctivae normal.   Cardiovascular:      Rate and Rhythm: Tachycardia present.      Pulses: Normal pulses.      Heart sounds: No murmur heard.     Pulmonary:      Effort: Pulmonary effort is normal. No respiratory  distress.      Breath sounds: No wheezing or rales.      Comments: Shortness of breath, diminished on bases   Abdominal:      General: Bowel sounds are normal. There is distension.      Palpations: Abdomen is soft.      Tenderness: There is no abdominal tenderness. There is no guarding.   Musculoskeletal:      Cervical back: Normal range of motion.      Right lower leg: Edema present.      Left lower leg: Edema present.   Skin:     General: Skin is warm.   Neurological:      Mental Status: She is alert. She is disoriented.      Comments: Responsive.  Minimally verbal but does not answer commands.   Psychiatric:      Comments: Unable to assess as patient is minimally verbal         Fluids    Intake/Output Summary (Last 24 hours) at 7/21/2021 0738  Last data filed at 7/20/2021 1844  Gross per 24 hour   Intake --   Output 600 ml   Net -600 ml       Laboratory  Recent Labs     07/18/21  1252   WBC 39.5*   RBC 5.29   HEMOGLOBIN 15.1   HEMATOCRIT 47.1*   MCV 89.0   MCH 28.5   MCHC 32.1*   RDW 45.5   PLATELETCT 340   MPV 9.7     Recent Labs     07/18/21  1252   SODIUM 136   POTASSIUM 4.9   CHLORIDE 104   CO2 17*   GLUCOSE 136*   BUN 59*   CREATININE 1.16   CALCIUM 9.6                   Imaging  US-EXTREMITY VENOUS LOWER BILAT   Final Result      EC-ECHOCARDIOGRAM COMPLETE W/O CONT   Final Result      DX-CHEST-2 VIEWS   Final Result      1. Continued moderate left pleural effusion, obscuring the left lung base.   2. Small right pleural effusion.   3. No other acute findings.      US-THORACENTESIS PUNCTURE RIGHT   Final Result      1. Ultrasound guided right sided diagnostic and therapeutic thoracentesis.      2. 1200 mL of fluid withdrawn.      DX-CHEST-PORTABLE (1 VIEW)   Final Result      1. Interval decrease in size of the right pleural effusion after right thoracentesis. No pneumothorax.   2. Continued moderate to large left pleural effusion, obscuring the left lung base.           Assessment/Plan  Advance care  planning- (present on admission)  Assessment & Plan  Comfort care/DNR per previous discussion with previous physician  Hospice referral sent out    Anxiety attack- (present on admission)  Assessment & Plan  Pt possible has some PTSD/adjsument disorder per previous diagnose of [place] fever   Lot of misconception of medical term and some delusion/ilusons on her care   Continue ativan prn   Comfort care     Obesity (BMI 30-39.9)- (present on admission)  Assessment & Plan  Body mass index is 32.42 kg/m².  Counseled about diet and exercise  Now comfort care    Acute respiratory failure with hypoxia (HCC)- (present on admission)  Assessment & Plan  Due to PE/volume overload from malignancy     History of [facility] fever- (present on admission)  Assessment & Plan  Pleural fungal cultures ngt    Post-menopausal bleeding- (present on admission)  Assessment & Plan  Never normal.  CT abdomen/pelvis as per above not normal.  Appreciate recommendations per Dr. Dejesus,   Dr. Dejesus is very confident that pt has uterus cancer and her prognosis is poor  Pt does not want further treatment     Hypoalbuminemia- (present on admission)  Assessment & Plan  Malnutrition/versus malignancy    Thrombocytosis (HCC)- (present on admission)  Assessment & Plan  Reactive. Comfort care     Pleural effusion- (present on admission)  Assessment & Plan  Thoracentesis ordered.  Echo cardiogram unremarkable   BNP to follow  Due to malignancy   Continue lasix for comfort    Uterine mass- (present on admission)  Assessment & Plan  Appreciate recommendations per Dr. Dejesus.  Follow-up on pathology from pleural studies  -pt agrees with hospice/comfort care    Leukocytosis- (present on admission)  Assessment & Plan  Hold on antibiotic/ no improvement   Blood cultures 7/12 -  Urine culture 7/7 -  Most likely related to PE/malignancy   Comfort care     CAROLIN (acute kidney injury) (HCC)- (present on admission)  Assessment & Plan  Do not monitor. Pt is comfort  care   Continue lasix for volume overload     Hyponatremia- (present on admission)  Assessment & Plan  Hypervolemic   Continue to monitor. Avoid salt tabs   Urine/protein/cr ratio low. nephrotic syndrome ruled out. Minimal protein in UA On arrival  Due to malignancy   Comfort care     Sepsis (HCC)- (present on admission)  Assessment & Plan  This is Sepsis Present on admission  SIRS criteria identified on my evaluation include: Tachycardia, with heart rate greater than 90 BPM and Leukocytosis, with WBC greater than 12,000  Source is UTI  Sepsis protocol initiated  Fluid resuscitation ordered per protocol  IV antibiotics as appropriate for source of sepsis  While organ dysfunction may be noted elsewhere in this problem list or in the chart, degree of organ dysfunction does not meet CMS criteria for severe sepsis  7/15-pt was admitted for sepsis. Subjective fever at home. And above criteria. Source possible UTI. + urinary symptoms.  However patient not improving on antibiotic.  Blood cultures/urine cultures negative.  Discontinue antibiotic.  Continue to monitor this point.  Patient started to become hypotensive, fever start meropenem (rash with penicillin, however worth to challenge it) and vancomycin.  Also patient has history of [place] fever. Consider to add diflucan. Also other explanation for sepsis picture is PE/ [place] fever/malignancy . + pleural fluid on abdominal/pelvis CT  Lactic acid elevated on arrival   7/19- not likely sepsis. All related to malignancy/PE   On comfort care       VTE prophylaxis: SCD    I have performed a physical exam and reviewed and updated ROS and Plan today (7/21/2021). In review of yesterday's note (7/20/2021), there are no changes except as documented above.

## 2021-07-21 NOTE — CARE PLAN
Problem: Knowledge Deficit - Standard  Goal: Patient and family/care givers will demonstrate understanding of plan of care, disease process/condition, diagnostic tests and medications  Outcome: Not Progressing     Problem: Skin Integrity  Goal: Skin integrity is maintained or improved  Outcome: Not Progressing     Problem: Pain - Standard  Goal: Alleviation of pain or a reduction in pain to the patient’s comfort goal  Outcome: Not Progressing     The patient is Watcher - Medium risk of patient condition declining or worsening    Shift Goals  Clinical Goals: comfort  Patient Goals: comfort  Family Goals: comfort    Progress made toward(s) clinical / shift goals:  awaiting placement/paperwork into [facility]. IV dilaudid & ativan given, reposition Q2 hours. Roshan Pisano at bedside this AM.    Patient is not progressing towards the following goals:      Problem: Knowledge Deficit - Standard  Goal: Patient and family/care givers will demonstrate understanding of plan of care, disease process/condition, diagnostic tests and medications  Outcome: Not Progressing     Problem: Skin Integrity  Goal: Skin integrity is maintained or improved  Outcome: Not Progressing     Problem: Pain - Standard  Goal: Alleviation of pain or a reduction in pain to the patient’s comfort goal  Outcome: Not Progressing

## 2021-07-21 NOTE — HOSPICE
Kelly called back & informed me that the  didn't have any POA paperwork for Madelyn to sign.  There is no known POA for Madelyn.  Kelly agreed to call the  office back to see if he had any POA documents on file.  Dr. Najera notified.

## 2021-07-21 NOTE — PROGRESS NOTES
Pt calling out, she stated she needs a sip of water. Pt refused to be repositioned @ this time. Pt is tearful,  but pt declined to have pain meds.

## 2021-07-21 NOTE — DISCHARGE PLANNING
Anticipated Discharge Disposition: Comfort Care/[facility] hospice.    Action: Patient was discussed at IDT rounds, patient was assessed for [facility] hospice, per Chris Oliveira, from [facility], consents need to be signed. Patient's friend Kelly spoke with hospice RN, who was informed there is no known POA for Madelyn. Kelly will call 's office back to ascertain if any POA paperwork is on file.     This RN,  asked Niki Enirque to complete a NOK search on patient for any possible relatives.     Barriers to Discharge: Patient's cannot sign hospice consents.    Plan: HCM to follow with [facility] if friend Kelly has POA paperwork, or NOK search has turned up any relatives.     Larissa Capps RN,

## 2021-07-22 PROBLEM — N17.9 AKI (ACUTE KIDNEY INJURY) (HCC): Status: RESOLVED | Noted: 2021-01-01 | Resolved: 2021-01-01

## 2021-07-22 PROBLEM — D72.829 LEUKOCYTOSIS: Status: RESOLVED | Noted: 2021-01-01 | Resolved: 2021-01-01

## 2021-07-22 PROBLEM — A41.9 SEPSIS (HCC): Status: RESOLVED | Noted: 2021-01-01 | Resolved: 2021-01-01

## 2021-07-22 PROBLEM — F41.0 ANXIETY ATTACK: Status: RESOLVED | Noted: 2021-01-01 | Resolved: 2021-01-01

## 2021-07-22 PROBLEM — N95.0 POST-MENOPAUSAL BLEEDING: Status: RESOLVED | Noted: 2021-01-01 | Resolved: 2021-01-01

## 2021-07-22 PROBLEM — J96.01 ACUTE RESPIRATORY FAILURE WITH HYPOXIA (HCC): Status: RESOLVED | Noted: 2021-01-01 | Resolved: 2021-01-01

## 2021-07-22 PROBLEM — D75.839 THROMBOCYTOSIS: Status: RESOLVED | Noted: 2021-01-01 | Resolved: 2021-01-01

## 2021-07-22 PROBLEM — Z86.19 HISTORY OF SAN JOAQUIN VALLEY FEVER: Status: RESOLVED | Noted: 2021-01-01 | Resolved: 2021-01-01

## 2021-07-22 PROBLEM — E87.1 HYPONATREMIA: Status: RESOLVED | Noted: 2021-01-01 | Resolved: 2021-01-01

## 2021-07-22 PROBLEM — E88.09 HYPOALBUMINEMIA: Status: RESOLVED | Noted: 2021-01-01 | Resolved: 2021-01-01

## 2021-07-22 PROBLEM — J90 PLEURAL EFFUSION: Status: RESOLVED | Noted: 2021-01-01 | Resolved: 2021-01-01

## 2021-07-22 NOTE — PROGRESS NOTES
Pt passed at 1225. Kelly, her friend, notified at 1257.  notified at 1309. Dr Najera notified at 1316. Tissue bank notified at 1317. Not a candidate for donation.

## 2021-07-22 NOTE — WOUND TEAM
[facility]  Inpatient Services  Initial Wound and Skin Care Evaluation    Admission Date: 7/14/2021     Last order of IP CONSULT TO WOUND CARE was found on 7/15/2021 from Hospital Encounter on 7/14/2021     HPI, PMH, SH: Reviewed    Past Surgical History:   Procedure Laterality Date   • CATARACT EXTRACTION WITH IOL Bilateral    • OTHER      retinal repain     Social History     Tobacco Use   • Smoking status: Never Smoker   • Smokeless tobacco: Never Used   Substance Use Topics   • Alcohol use: Yes     Comment: rarely      No chief complaint on file.    Diagnosis: Uterine mass [N85.8]    Unit where seen by Wound Team:[id number]    WOUND CONSULT/FOLLOW UP RELATED TO:  [facility]     WOUND HISTORY:  Madelyn Montes is a 71 y.o. female with generalized weakness fevers chills, suprapubic pain dysuria and frequency.  Apparently patient was evaluated 5 days prior and was prescribed Augmentin for a urinary tract infection however she was not able to take those antibiotics due to the size of the pill which was inducing nausea and she felt she had difficulty with swallowing.  She felt her generalized weakness fevers chills and abdominal pain worsened.  She reports that the pain is in suprapubic region no radiation to other places no relieving or aggravating factors.  WBC was elevated at 25.3 and lactic acid 3.7.  Initially there was concern of UTI causing sepsis but without improvement in her symptoms with rocephin and WBC rising again, discussion of her CT results was held with Dr Dejesus with concern of pleural effusion, ascites and uterine mass.  He recommends tapping of the pleural effusion and possibly her ascites for cytology and culture and following this expanding to broader antibiotic coverage pending results of cytology.     WOUND ASSESSMENT/LDA  Wound 07/14/21 Buttocks POA DTI (Active)      07/21/21 1530   Site Assessment Red;Purple    Periwound Assessment Purple;Fragile    Margins Defined edges    Closure  Secondary intention    Drainage Amount Small    Drainage Description Serosanguineous    Treatments Cleansed;Offloading    Wound Cleansing Foam Cleanser/Washcloth    Periwound Protectant Skin Protectant Wipes to Periwound;Barrier Paste    Dressing Cleansing/Solutions Not Applicable    Dressing Options Mepilex    Dressing Changed Observed    Dressing Status Intact    Dressing Change/Treatment Frequency Every 72 hrs, and As Needed    NEXT Dressing Change/Treatment Date 07/24/21    NEXT Weekly Photo (Inpatient Only) 07/28/21    Pressure Injury Stage DTPI 07/21/21 1530   Wound Length (cm) 5 cm 07/21/21 1530   Wound Width (cm) 7 cm 07/21/21 1530   Wound Surface Area (cm^2) 35 cm^2 07/21/21 1530   Shape oval    Wound Odor None    Pulses N/A    Exposed Structures DERRELL    Number of days: 7        Vascular:    BARRY:   No results found.    Lab Values:    Lab Results   Component Value Date/Time    WBC 39.5 (HH) 07/18/2021 12:52 PM    RBC 5.29 07/18/2021 12:52 PM    HEMOGLOBIN 15.1 07/18/2021 12:52 PM    HEMATOCRIT 47.1 (H) 07/18/2021 12:52 PM        Culture Results show:  No results found for this or any previous visit (from the past 720 hour(s)).    Pain Level/Medicated:  Patient c/o pain,but tolerated turning       INTERVENTIONS BY WOUND TEAM:  Chart and images reviewed. Discussed with bedside RN. All areas of concern (based on picture review, LDA review and discussion with bedside RN) have been thoroughly assessed. Documentation of areas based on significant findings. This RN in to assess patient. Performed standard wound care which includes appropriate positioning, dressing removal and non-selective debridement. Pictures and measurements obtained weekly if/when required.  Preparation for Dressing removal: Dressing soaked with n/a  Cleansed with:  foam cleanser and gauze, barrier wipe.  Sharp debridement: NA  [place]a wound: Cleansed with foam cleanser, barrier wipe  Primary Dressing: Barrier paste  Secondary (Outer) Dressing:  mepilex    Interdisciplinary consultation: Patient, Bedside RN    EVALUATION / RATIONALE FOR TREATMENT:  Most Recent Date:  7/2: Pt's POA DTI  starting to open. Barrier past in use with mepilex, ordered viscopaste for better adherence of mepilex. Pt is comfort care and don't expect the wound to resolve. Drsg for comfort and decreased drsg changes.   7/18/21: patient transferred from outside facility to [facility] with POA DTI, patient is now on comfort care.  Orders were placed for larsen catheter, and antifungal cream to help with sacrum wound     Goals: Steady decrease in wound area and depth weekly.    WOUND TEAM PLAN OF CARE ([X] for frequency of wound follow up,): X  Nursing to follow orders written for wound care. Contact wound team if area fails to progress, deteriorates or with any questions/concerns  Dressing changes by wound team:                   Follow up 3 times weekly:                NPWT change 3 times weekly:     Follow up 1-2 times weekly:      Follow up Bi-Monthly:                   Follow up as needed:   Wound team is not following please consult if needed  Other (explain):     NURSING PLAN OF CARE ORDERS (X):  Dressing changes: See Dressing Care orders: X  Skin care: See Skin Care orders: was DC'd by CC  RN Prevention Protocol: was DC'd by CC  Rectal tube care: See Rectal Tube Care orders:   Other orders:       Anticipated discharge plans: DERRELL  LTACH:        SNF/Rehab:                  Home Health Care:           Outpatient Wound Center:            Self/Family Care:        Other:   inpt hospice

## 2021-07-22 NOTE — CARE PLAN
The patient is Watcher - Medium risk of patient condition declining or worsening    Shift Goals  Clinical Goals: comfort/pain control  Patient Goals: DERRELL  Family Goals: N/A    Progress made toward(s) clinical / shift goals:    Problem: Knowledge Deficit - Standard  Goal: Patient and family/care givers will demonstrate understanding of plan of care, disease process/condition, diagnostic tests and medications  Outcome: Progressing     Problem: Skin Integrity  Goal: Skin integrity is maintained or improved  Outcome: Progressing     Problem: Fall Risk  Goal: Patient will remain free from falls  Outcome: Progressing     Problem: Pain - Standard  Goal: Alleviation of pain or a reduction in pain to the patient’s comfort goal  Outcome: Progressing     Problem: Hemodynamics  Goal: Patient's hemodynamics, fluid balance and neurologic status will be stable or improve  Outcome: Met     Problem: Fluid Volume  Goal: Fluid volume balance will be maintained  Outcome: Met     Problem: Urinary - Renal Perfusion  Goal: Ability to achieve and maintain adequate renal perfusion and functioning will improve  Outcome: Met     Problem: Respiratory  Goal: Patient will achieve/maintain optimum respiratory ventilation and gas exchange  Outcome: Met     Problem: Physical Regulation  Goal: Diagnostic test results will improve  Outcome: Met  Goal: Signs and symptoms of infection will decrease  Outcome: Met       Patient is not progressing towards the following goals:

## 2021-07-22 NOTE — DISCHARGE SUMMARY
Death Summary    Cause of Death  Cardiac arrest due to acute hypoxic respiratory failure due to GYN malignancy as well as deep venous thrombosis.    Comorbid Conditions at the Time of Death  Active Problems:    Uterine mass POA: Yes    Obesity (BMI 30-39.9) POA: Yes    Advance care planning POA: Yes  Resolved Problems:    Sepsis (HCC) POA: Yes    Hyponatremia POA: Yes    CAROLIN (acute kidney injury) (HCC) POA: Yes    Leukocytosis POA: Yes    Pleural effusion POA: Yes    Thrombocytosis (HCC) POA: Yes    Hypoalbuminemia POA: Yes    Post-menopausal bleeding POA: Yes    History of [place] fever POA: Yes    Acute respiratory failure with hypoxia (HCC) POA: Yes    Anxiety attack POA: Yes      History of Presenting Illness and Hospital Course  71-year-old female past medical history of obesity, [place] fever, of retinal disorders, presented 7/12/2021 with generalized weakness fever, chills, suprapubic pain, dysuria, increased frequency at [facility]. Patient was discharged from ED on 7/7 on Augmentin for possible urinary tract infection. She was not able to swallow the antibiotic therefore she presented again back to the emergency room at 7/12/2021. On arrival she was septic, white count 25.3, tachycardic. She was started on ceftriaxone with no improvement. CT of the abdomen/pelvis was done and showed enlargement of the uterus with exophytic partially calcified mass, moderate pleural fluids, Small volume of abdominal and moderate volume of pelvic ascites. GYN/oncology Dr. Dejesus was consulted for further evaluation concern for possible GYN malignancy since patient not improving on IV antibiotic. He did advise patient to be transferred for higher level of care at [facility] and further evaluation. Previous urine culture 7/7 negative. Blood cultures 7/12/2021 negative.  Antibiotic were discontinued.  She was scheduled for surgery, biopsy 7/17. patient got paranoid/very agitated.  Surgery  was canceled.  Initially patient request to leave AMA, however she was actively psychotic from agitation and anxiety.  She was placed on legal hold which was discontinued the following day and she was able make reasonable decisions.  D-dimer above 20.  CT was not able to get done because of allergy from contrast and patient concern for eye bleeding if steroids were given.  However lower extremity DVTs positive. With that information and expertise from Dr. Dejesus that patient most likely has very advanced cancer.  Patient agreed for comfort care measures which were initiated 7/18. Multiple discussion with pt and her friend provided. Lasix were left for pt comfort care since she is very volume overload.  Patient passed while in the hospital on 7/22/2021    Death Date: 07/22/21   Death Time: 1225         Pronounced By (RN1): Kesha Rai RN  Pronounced By (RN2): Chelly Gutiérrez RN      Total time of the discharge process was 36 minutes.

## 2021-07-22 NOTE — HOSPICE
Received call from [facility]  didn't have any POA documentation.  I only received one call back from next of kin list & she was not related.  No one found to sign hospice paperwork for pt.

## 2021-07-23 NOTE — DOCUMENTATION QUERY
[facility]                                                                       Query Response Note      PATIENT:               EDEN CRUM  ACCT #:                  [id number]  MRN:                     [id number]  :                      1950  ADMIT DATE:       2021 8:28 PM  DISCH DATE:        2021 12:25 PM  RESPONDING  PROVIDER #:        [id number]           QUERY TEXT:    Not likely sepsis, all related to malignancy/PE is documented in the 21 MD PN, cause of death: due to sepsis is documented in the DC Summary. Can a clarification be made as to the status of the sepsis diagnosis?    NOTE:  If an appropriate response is not listed below, please respond with a new note.       The patient's Clinical Indicators include:  21 Gyn/onc consult:   -not septic  -suspect that her elevated white count persistent is due to possibly a parametria.  21 MD PN:  -not likely sepsis  -other explanation for  sepsis picture is PE/ [place] fever/malignancy   DC Summary:  -Cardiac arrest due to acute hypoxic respiratory failure due to sepsis due to GYN malignancy as well as dependence thrombosis.  -Active problems: Sepsis POA    Treatment: Rocephin, Gyn/Onc consult, Sepsis protocol, Antibiotics discontinued  Risk factors: Probable uterine cancer, CAROLIN, Pleural effusion, DVT, [place] fever    Thank you,  Armida Cortes RN, BSN  Clinical   Connect via [employer]  .  Options provided:   -- Sepsis is ruled in   -- Sepsis is ruled out   -- Unable to determine      Query created by: Armida Cortes on 2021 9:02 AM    RESPONSE TEXT:    Sepsis is ruled out       QUERY TEXT:    Pt has documented pulmonary embolism noted as ?rule out? or similar terminology such as suspected, probable, etc.  Please clarify status of this condition:    NOTE:  If an appropriate response is not listed  below, please respond with a new note.       The patient's Clinical Indicators include:  7/17/21 MD PN: lab works and current treatment, it is concerning for PE   7/18/21 MD PN: Most likely patient has PE  7/19/21 MD PN:   -Acute respiratory failure due to PE/volume overload from malignancy  -D-dimer above 20  7/18/21 LE US results: Thrombosis of bilateral peroneal veins    Treatment: LE US, Lasix, Lovenox  Risk Factors: Probable uterine cancer, Bilateral LE DVT's, Acute respiratory failure, Pleural effusions    Thank you,  Armida Cortes RN, BSN  Clinical   Connect via [employer]  .  Options provided:   -- Likely pulmonary embolism is ruled in   -- Likely pulmonary embolism is ruled out   -- Unable to determine      Query created by: Armida Cortes on 7/23/2021 9:16 AM    RESPONSE TEXT:    Likely pulmonary embolism is ruled in  Patient had deep venous thrombosis demonstrated on ultrasound. She does have a contrast allergy and declined desensitization with steroids, thus pulmonary embolism could not be confirmed with CT angio of the chest. Since she already had a confirmed DVT she needed therapeutic anticoagulation anyway whether or not she also had a pulmonary embolism. Thus it would not  and she was started on therapeutic anticoagulation for a DVT and presumed PE.       QUERY TEXT:    Pressure ulcer is documented as follows by the Wound Care RN in the Medical Record.     Wound 07/14/21 Buttocks POA DTI (Active)    Please specify if you:    NOTE:  If an appropriate response is not listed below, please respond with a new note.      The patient's Clinical Indicators include:  7/18/21 Wound Team consult: Wound 07/14/21 Buttocks POA DTI (Active)    Treatment: Clinical picture, Wound Team eval & care plan  Risk Factors: Probable uterine cancer, Bilateral LE DVT's, Acute respiratory failure, Pleural effusions    Thank you,  Armida Cortes RN, BSN  Clinical    Connect via [employer]  .  Options provided:   -- Agree with Wound Care RN assessment   -- Disagree with Wound Care RN assessment   -- Unable to determine      Query created by: Armida Cortes on 7/23/2021 9:17 AM    RESPONSE TEXT:    Agree with Wound Care RN assessment          Electronically signed by:  MIKEY WORKMAN MD 7/23/2021 3:16 PM

## 2021-07-23 NOTE — DOCUMENTATION QUERY
[facility]                                                                       Query Response Note      PATIENT:               EDEN CRUM  ACCT #:                  [id number]  MRN:                     [id number]  :                      1950  ADMIT DATE:       2021 8:28 PM  DISCH DATE:        2021 12:25 PM  RESPONDING  PROVIDER #:        [id number]           QUERY TEXT:    Please clarify the clinical relevance for the clinical/diagnostic findings of Anion gap 18.0.      NOTE:  If an appropriate response is not listed below, please respond with a new note.          The patient's clinical indicators include:  Lab Results:  7/15/21: Anion gap 18.0    Treatment: Monitor labs, NS infusion  Risk Factors: CAROLIN, Probable uterine cancer, Hyponatremia, Hypoalbuminemia, Acute respiratory failure    Thank you,  Armida Cortes RN, BSN  Clinical   Connect via [employer]  .  Options provided:   -- Elevated anion gap is clinically relevant- anion gap metabolic acidosis   -- Findings on no clinical significance   -- Unable to determine      Query created by: Armida Cortes on 2021 9:36 AM    RESPONSE TEXT:    Elevated anion gap is clinically relevant- anion gap metabolic acidosis          Electronically signed by:  MIKEY WORKMAN MD 2021 3:34 PM

## 2021-08-11 LAB
FUNGUS SPEC CULT: NORMAL
FUNGUS SPEC FUNGUS STN: NORMAL
SIGNIFICANT IND 70042: NORMAL
SITE SITE: NORMAL
SOURCE SOURCE: NORMAL

## 2021-08-31 LAB
MYCOBACTERIUM SPEC CULT: NORMAL
RHODAMINE-AURAMINE STN SPEC: NORMAL
SIGNIFICANT IND 70042: NORMAL
SITE SITE: NORMAL
SOURCE SOURCE: NORMAL

## 2023-02-24 NOTE — ED NOTES
Verbal order Dr Morrissey do not give third liter of NS.  Order canceled.    Intermediate Repair Preamble Text (Leave Blank If You Do Not Want): Undermining was performed with blunt dissection.

## (undated) DEVICE — CANNULA W/SEAL 5X100 Z-THRE - ADED KII (12/BX)

## (undated) DEVICE — GLOVE BIOGEL PI ORTHO SZ 7.5 PF LF (40PR/BX)

## (undated) DEVICE — NEEDLE INSUFFLATION FOR STEP - (12/BX)

## (undated) DEVICE — DRESSING TRANSPARENT FILM TEGADERM 2.375 X 2.75"  (100EA/BX)"

## (undated) DEVICE — MASK ANESTHESIA ADULT  - (100/CA)

## (undated) DEVICE — PACK LAPAROSCOPY - (1/CA)

## (undated) DEVICE — SENSOR SPO2 NEO LNCS ADHESIVE (20/BX) SEE USER NOTES

## (undated) DEVICE — SODIUM CHL IRRIGATION 0.9% 1000ML (12EA/CA)

## (undated) DEVICE — TOWELS CLOTH SURGICAL - (4/PK 20PK/CA)

## (undated) DEVICE — CHLORAPREP 26 ML APPLICATOR - ORANGE TINT(25/CA)

## (undated) DEVICE — SET EXTENSION WITH 2 PORTS (48EA/CA) ***PART #2C8610 IS A SUBSTITUTE*****

## (undated) DEVICE — SPONGE GAUZESTER. 2X2 4-PL - (2/PK 50PK/BX 30BX/CS)

## (undated) DEVICE — TRAY SKIN SCRUB PVP WET (20EA/CA) PART #DYND70356 DISCONTINUED

## (undated) DEVICE — GOWN WARMING STANDARD FLEX - (30/CA)

## (undated) DEVICE — TROCAR 5X100 BLADED Z-THREAD - KII (6/BX)

## (undated) DEVICE — KIT ANESTHESIA W/CIRCUIT & 3/LT BAG W/FILTER (20EA/CA)

## (undated) DEVICE — TROCAR STEP 12MM - (3EA/CA)

## (undated) DEVICE — GOWN SURGEONS X-LARGE - DISP. (30/CA)

## (undated) DEVICE — GLOVE COTTON STERILE (50/CA)

## (undated) DEVICE — DRAPESURG STERI-DRAPE LONG - (10/BX 4BX/CA)

## (undated) DEVICE — SET LEADWIRE 5 LEAD BEDSIDE DISPOSABLE ECG (1SET OF 5/EA)

## (undated) DEVICE — SET TUBING PNEUMOCLEAR HIGH FLOW SMOKE EVACUATION (10EA/BX)

## (undated) DEVICE — SUTURE 0 VICRYL PLUS CT-2 - 27 INCH (36/BX)

## (undated) DEVICE — COVER FOOT UNIVERSAL DISP. - (25EA/CA)

## (undated) DEVICE — TOWEL STOP TIMEOUT SAFETY FLAG (40EA/CA)

## (undated) DEVICE — ARMREST CRADLE FOAM - (2PR/PK 12PR/CA)

## (undated) DEVICE — Device

## (undated) DEVICE — CANISTER SUCTION 3000ML MECHANICAL FILTER AUTO SHUTOFF MEDI-VAC NONSTERILE LF DISP  (40EA/CA)

## (undated) DEVICE — ELECTRODE DUAL RETURN W/ CORD - (50/PK)

## (undated) DEVICE — TRAY SRGPRP PVP IOD WT PRP - (20/CA)

## (undated) DEVICE — SET SUCTION/IRRIGATION WITH DISPOSABLE TIP (6/CA )PART #0250-070-520 IS A SUB

## (undated) DEVICE — HEAD HOLDER JUNIOR/ADULT

## (undated) DEVICE — NEPTUNE 4 PORT MANIFOLD - (20/PK)

## (undated) DEVICE — ELECTRODE 850 FOAM ADHESIVE - HYDROGEL RADIOTRNSPRNT (50/PK)

## (undated) DEVICE — LACTATED RINGERS INJ 1000 ML - (14EA/CA 60CA/PF)

## (undated) DEVICE — SUTURE GENERAL

## (undated) DEVICE — SUCTION INSTRUMENT YANKAUER BULBOUS TIP W/O VENT (50EA/CA)

## (undated) DEVICE — WATER IRRIGATION STERILE 1000ML (12EA/CA)

## (undated) DEVICE — BLADE SURGICAL CLIPPER - (50EA/CA)

## (undated) DEVICE — SUTURE 3-0 MONOCRYL PLUS PS-1 - 27 INCH (36/BX)

## (undated) DEVICE — SLEEVE, VASO, THIGH, MED

## (undated) DEVICE — STAPLER SKIN DISP - (6/BX 10BX/CA) VISISTAT

## (undated) DEVICE — PAD OR TABLE DA VINCI 2IN X 20IN X 72IN - (12EA/CA)

## (undated) DEVICE — PROTECTOR ULNA NERVE - (36PR/CA)

## (undated) DEVICE — TRAY CATHETER FOLEY URINE METER W/STATLOCK 350ML (10EA/CA)

## (undated) DEVICE — TUBING CLEARLINK DUO-VENT - C-FLO (48EA/CA)

## (undated) DEVICE — BRIEF STRETCH MATERNITY M/L - FITS 20-60IN (5EA/BG 20BG/CA)

## (undated) DEVICE — PAD SANITARY 11IN MAXI IND WRAPPED  (12EA/PK 24PK/CA)